# Patient Record
Sex: MALE | Race: BLACK OR AFRICAN AMERICAN | NOT HISPANIC OR LATINO | URBAN - METROPOLITAN AREA
[De-identification: names, ages, dates, MRNs, and addresses within clinical notes are randomized per-mention and may not be internally consistent; named-entity substitution may affect disease eponyms.]

---

## 2021-01-13 ENCOUNTER — INPATIENT (INPATIENT)
Facility: HOSPITAL | Age: 66
LOS: 1 days | Discharge: HOME | End: 2021-01-15
Attending: INTERNAL MEDICINE | Admitting: INTERNAL MEDICINE
Payer: MEDICARE

## 2021-01-13 VITALS
RESPIRATION RATE: 20 BRPM | OXYGEN SATURATION: 99 % | WEIGHT: 145.95 LBS | HEIGHT: 65 IN | HEART RATE: 66 BPM | DIASTOLIC BLOOD PRESSURE: 100 MMHG | TEMPERATURE: 98 F | SYSTOLIC BLOOD PRESSURE: 175 MMHG

## 2021-01-13 LAB
ACANTHOCYTES BLD QL SMEAR: SLIGHT — SIGNIFICANT CHANGE UP
ALBUMIN SERPL ELPH-MCNC: 2.9 G/DL — LOW (ref 3.3–5)
ALP SERPL-CCNC: 202 U/L — HIGH (ref 40–120)
ALT FLD-CCNC: 19 U/L — SIGNIFICANT CHANGE UP (ref 12–78)
ANION GAP SERPL CALC-SCNC: 10 MMOL/L — SIGNIFICANT CHANGE UP (ref 5–17)
ANISOCYTOSIS BLD QL: SLIGHT — SIGNIFICANT CHANGE UP
APTT BLD: 29.4 SEC — SIGNIFICANT CHANGE UP (ref 27.5–35.5)
AST SERPL-CCNC: 53 U/L — HIGH (ref 15–37)
BASOPHILS # BLD AUTO: 0.02 K/UL — SIGNIFICANT CHANGE UP (ref 0–0.2)
BASOPHILS NFR BLD AUTO: 0.3 % — SIGNIFICANT CHANGE UP (ref 0–2)
BILIRUB SERPL-MCNC: 0.7 MG/DL — SIGNIFICANT CHANGE UP (ref 0.2–1.2)
BUN SERPL-MCNC: 15 MG/DL — SIGNIFICANT CHANGE UP (ref 7–23)
CALCIUM SERPL-MCNC: 8.8 MG/DL — SIGNIFICANT CHANGE UP (ref 8.5–10.1)
CHLORIDE SERPL-SCNC: 101 MMOL/L — SIGNIFICANT CHANGE UP (ref 96–108)
CO2 SERPL-SCNC: 25 MMOL/L — SIGNIFICANT CHANGE UP (ref 22–31)
CREAT SERPL-MCNC: 0.97 MG/DL — SIGNIFICANT CHANGE UP (ref 0.5–1.3)
D DIMER BLD IA.RAPID-MCNC: 6065 NG/ML DDU — HIGH
ELLIPTOCYTES BLD QL SMEAR: SLIGHT — SIGNIFICANT CHANGE UP
EOSINOPHIL # BLD AUTO: 0.15 K/UL — SIGNIFICANT CHANGE UP (ref 0–0.5)
EOSINOPHIL NFR BLD AUTO: 2.4 % — SIGNIFICANT CHANGE UP (ref 0–6)
GLUCOSE SERPL-MCNC: 83 MG/DL — SIGNIFICANT CHANGE UP (ref 70–99)
HCT VFR BLD CALC: 32.6 % — LOW (ref 39–50)
HGB BLD-MCNC: 9.8 G/DL — LOW (ref 13–17)
HYPOCHROMIA BLD QL: SLIGHT — SIGNIFICANT CHANGE UP
IMM GRANULOCYTES NFR BLD AUTO: 0.5 % — SIGNIFICANT CHANGE UP (ref 0–1.5)
INR BLD: 1.08 RATIO — SIGNIFICANT CHANGE UP (ref 0.88–1.16)
LYMPHOCYTES # BLD AUTO: 0.83 K/UL — LOW (ref 1–3.3)
LYMPHOCYTES # BLD AUTO: 13.3 % — SIGNIFICANT CHANGE UP (ref 13–44)
MANUAL SMEAR VERIFICATION: SIGNIFICANT CHANGE UP
MCHC RBC-ENTMCNC: 20.1 PG — LOW (ref 27–34)
MCHC RBC-ENTMCNC: 30.1 GM/DL — LOW (ref 32–36)
MCV RBC AUTO: 66.9 FL — LOW (ref 80–100)
MICROCYTES BLD QL: SIGNIFICANT CHANGE UP
MONOCYTES # BLD AUTO: 0.63 K/UL — SIGNIFICANT CHANGE UP (ref 0–0.9)
MONOCYTES NFR BLD AUTO: 10.1 % — SIGNIFICANT CHANGE UP (ref 2–14)
NEUTROPHILS # BLD AUTO: 4.58 K/UL — SIGNIFICANT CHANGE UP (ref 1.8–7.4)
NEUTROPHILS NFR BLD AUTO: 73.4 % — SIGNIFICANT CHANGE UP (ref 43–77)
NRBC # BLD: 0 /100 WBCS — SIGNIFICANT CHANGE UP (ref 0–0)
OVALOCYTES BLD QL SMEAR: SLIGHT — SIGNIFICANT CHANGE UP
PLAT MORPH BLD: NORMAL — SIGNIFICANT CHANGE UP
PLATELET # BLD AUTO: 238 K/UL — SIGNIFICANT CHANGE UP (ref 150–400)
POLYCHROMASIA BLD QL SMEAR: SLIGHT — SIGNIFICANT CHANGE UP
POTASSIUM SERPL-MCNC: 3.5 MMOL/L — SIGNIFICANT CHANGE UP (ref 3.5–5.3)
POTASSIUM SERPL-SCNC: 3.5 MMOL/L — SIGNIFICANT CHANGE UP (ref 3.5–5.3)
PROT SERPL-MCNC: 6.8 GM/DL — SIGNIFICANT CHANGE UP (ref 6–8.3)
PROTHROM AB SERPL-ACNC: 12.5 SEC — SIGNIFICANT CHANGE UP (ref 10.6–13.6)
RBC # BLD: 4.87 M/UL — SIGNIFICANT CHANGE UP (ref 4.2–5.8)
RBC # FLD: 21.7 % — HIGH (ref 10.3–14.5)
RBC BLD AUTO: ABNORMAL
SCHISTOCYTES BLD QL AUTO: SLIGHT — SIGNIFICANT CHANGE UP
SODIUM SERPL-SCNC: 136 MMOL/L — SIGNIFICANT CHANGE UP (ref 135–145)
TROPONIN I SERPL-MCNC: 0.05 NG/ML — HIGH (ref 0.01–0.04)
WBC # BLD: 6.24 K/UL — SIGNIFICANT CHANGE UP (ref 3.8–10.5)
WBC # FLD AUTO: 6.24 K/UL — SIGNIFICANT CHANGE UP (ref 3.8–10.5)

## 2021-01-13 PROCEDURE — 93970 EXTREMITY STUDY: CPT | Mod: 26

## 2021-01-13 PROCEDURE — 71045 X-RAY EXAM CHEST 1 VIEW: CPT | Mod: 26

## 2021-01-13 PROCEDURE — 99223 1ST HOSP IP/OBS HIGH 75: CPT | Mod: AI

## 2021-01-13 PROCEDURE — 99285 EMERGENCY DEPT VISIT HI MDM: CPT

## 2021-01-13 RX ORDER — HEPARIN SODIUM 5000 [USP'U]/ML
5500 INJECTION INTRAVENOUS; SUBCUTANEOUS EVERY 6 HOURS
Refills: 0 | Status: DISCONTINUED | OUTPATIENT
Start: 2021-01-13 | End: 2021-01-14

## 2021-01-13 RX ORDER — HEPARIN SODIUM 5000 [USP'U]/ML
2500 INJECTION INTRAVENOUS; SUBCUTANEOUS EVERY 6 HOURS
Refills: 0 | Status: DISCONTINUED | OUTPATIENT
Start: 2021-01-13 | End: 2021-01-14

## 2021-01-13 RX ORDER — HEPARIN SODIUM 5000 [USP'U]/ML
5500 INJECTION INTRAVENOUS; SUBCUTANEOUS ONCE
Refills: 0 | Status: COMPLETED | OUTPATIENT
Start: 2021-01-13 | End: 2021-01-13

## 2021-01-13 RX ORDER — HEPARIN SODIUM 5000 [USP'U]/ML
INJECTION INTRAVENOUS; SUBCUTANEOUS
Qty: 25000 | Refills: 0 | Status: DISCONTINUED | OUTPATIENT
Start: 2021-01-13 | End: 2021-01-14

## 2021-01-13 RX ORDER — LABETALOL HCL 100 MG
10 TABLET ORAL ONCE
Refills: 0 | Status: COMPLETED | OUTPATIENT
Start: 2021-01-13 | End: 2021-01-13

## 2021-01-13 RX ADMIN — HEPARIN SODIUM 5000 UNIT(S): 5000 INJECTION INTRAVENOUS; SUBCUTANEOUS at 21:42

## 2021-01-13 RX ADMIN — Medication 10 MILLIGRAM(S): at 21:20

## 2021-01-13 RX ADMIN — HEPARIN SODIUM 1200 UNIT(S)/HR: 5000 INJECTION INTRAVENOUS; SUBCUTANEOUS at 21:40

## 2021-01-13 NOTE — H&P ADULT - NSHPLABSRESULTS_GEN_ALL_CORE
LABS:                        8.6    5.63  )-----------( 224      ( 14 Jan 2021 05:00 )             27.8     01-13    136  |  101  |  15  ----------------------------<  83  3.5   |  25  |  0.97    Ca    8.8      13 Jan 2021 20:30    TPro  6.8  /  Alb  2.9<L>  /  TBili  0.7  /  DBili  x   /  AST  53<H>  /  ALT  19  /  AlkPhos  202<H>  01-13    PT/INR - ( 13 Jan 2021 20:30 )   PT: 12.5 sec;   INR: 1.08 ratio         PTT - ( 14 Jan 2021 05:00 )  PTT:66.6 sec    CAPILLARY BLOOD GLUCOSE            RADIOLOGY & ADDITIONAL TESTS:    Imaging Personally Reviewed:  [ X] YES  [ ] NO

## 2021-01-13 NOTE — ED PROVIDER NOTE - CLINICAL SUMMARY MEDICAL DECISION MAKING FREE TEXT BOX
Patient presents from physician for PE.  VSS, no hypoxia or evidence of heart strain.  US pending, can be obtained on admission, no edema.  EKG nonischemic, elevated trop likely due to PE.  Patient being heparinized.  Family aware.  Patient is to be admitted to the hospital and the case was discussed with the admitting physician.  Any changes in plan, additional imaging/labs, and further work up will be at the discretion of the admitting physician. Per discussion with admitting physician, all necessary consults for admitted patients to be obtained by morning team unless patient requires emergent intervention or medical advice by specialist overnight.

## 2021-01-13 NOTE — ED PROVIDER NOTE - PROGRESS NOTE DETAILS
Contact Dr. Ortiz. Pending return call. 2nd call placed to Dr. Mullins Dr. Ramirez called ER from Dr. Hernandez service, informs that patient has "multifocal pulmonary emboli....most extensive in the RLL."  Patient already being heparinized.

## 2021-01-13 NOTE — ED PROVIDER NOTE - NS ED ROS FT
No fever/chills, No photophobia/eye pain/changes in vision, No ear pain/sore throat/dysphagia, No chest pain/palpitations, +Dyspnea on exertion  no SOB/cough/wheeze/stridor, No abdominal pain, No N/V/D, no dysuria/frequency/discharge, No neck/back pain, no rash, no changes in neurological status/function.

## 2021-01-13 NOTE — H&P ADULT - NSHPPHYSICALEXAM_GEN_ALL_CORE
Vital Signs Last 24 Hrs  T(C): 36.8 (14 Jan 2021 03:12), Max: 37.3 (13 Jan 2021 23:47)  T(F): 98.2 (14 Jan 2021 03:12), Max: 99.1 (13 Jan 2021 23:47)  HR: 57 (14 Jan 2021 03:12) (57 - 66)  BP: 151/86 (14 Jan 2021 03:12) (123/87 - 175/100)  BP(mean): --  RR: 20 (14 Jan 2021 03:12) (20 - 24)  SpO2: 100% (14 Jan 2021 03:12) (99% - 100%)    PHYSICAL EXAM:    GENERAL: NAD, well-groomed, well-developed  HEAD:  Atraumatic, Normocephalic  EYES: EOMI, PERRLA, conjunctiva and sclera clear  ENMT: No tonsillar erythema, exudates, or enlargement; Moist mucous membranes, No lesions  NECK: Supple, No JVD, Normal thyroid  NERVOUS SYSTEM:  Alert & Oriented X3, Good concentration; Motor Strength 5/5 B/L upper and lower extremities; DTRs 2+ intact and symmetric  CHEST/LUNG: Clear to percussion bilaterally; No rales, rhonchi, wheezing, or rubs  HEART: Regular rate and rhythm; No rubs, or gallops, +S1,S2  ABDOMEN: Soft, Nontender, Nondistended; Bowel sounds present  EXTREMITIES:  2+ Peripheral Pulses, No clubbing, cyanosis, or edema  LYMPH: No cervical adenopathy  RECTAL: deferred  BREAST: No palpatble masses, skin no lesions   : deferred  SKIN: No rashes or lesions    IMPROVE VTE Individual Risk Assessment        RISK                                                          Points  [ x ] Previous VTE                                                3  [  ] Thrombophilia                                             2  [  ] Lower limb paralysis                                    2        (unable to hold up >15 seconds)    [  ] Current Cancer                                             2         (within 6 months)  [ x ] Immobilization > 24 hrs                              1  [  ] ICU/CCU stay > 24 hours                            1  [  x] Age > 60                                                    1  IMPROVE VTE Score _____5____

## 2021-01-13 NOTE — ED ADULT TRIAGE NOTE - CHIEF COMPLAINT QUOTE
pt states had ct chest done at outpt center today and results show pe referred to er denies shortness of breath c/o chest discomfort per pt had negative covid test today bp elevated in triage hx htn missed bp meds x 3 days per pt

## 2021-01-13 NOTE — ED ADULT NURSE NOTE - OBJECTIVE STATEMENT
Pt send by PMD for PE found on CTA done for colon/liver cancer staging this morning. Pt anxious, tachypneic, and hypertensive. NSR on cardiac monitor, 100% oxygen saturation on room air. Pt denies chest pain and shortness of breath, states only symptom is anxiety. Pt also has history of prostate cancer and prostatectomy x 10 yrs ago. Dr Burgos unable to visualize CTA completed outpatient, per physician son Dia requesting initiation of heparin sooner rather than later due to high risk coagulable state.

## 2021-01-13 NOTE — H&P ADULT - HISTORY OF PRESENT ILLNESS
Pt is a 64 y/o male w/pmhx of prostate ca in remission, and HTN was being w/u for wt loss by pmd and pt had a ct done that revealed PE and also ascending colon mass and sent in by pmd for eval.  pt currently denies any fever, chills, sob,cp, palpitations, n/vd/c/ in ed was sating 99% on ra.  	Dr. Mullins Contact:   	[phone]

## 2021-01-13 NOTE — ED PROVIDER NOTE - PHYSICAL EXAMINATION
Gen: Alert, NAD, well appearing  Head: NC, AT, EOMI, normal lids/conjunctiva  ENT: normal hearing, patent oropharynx without erythema/exudate, uvula midline  Neck: +supple, no tenderness/meningismus/JVD, +Trachea midline  Pulm: Bilateral BS, normal resp effort, no wheeze/stridor/retractions  CV: RRR, no M/R/G, +dist pulses  Abd: soft, NT/ND, Negative [place] signs, +BS, no palpable masses  Mskel: no edema/erythema/cyanosis  Skin: no rash, warm/dry  Neuro: AAOx3, no apparent sensory/motor deficits, coordination intact

## 2021-01-13 NOTE — ED PROVIDER NOTE - OBJECTIVE STATEMENT
Triage Nurses Notes: "pt states had ct chest done at outpt center today and results show pe referred to er denies shortness of breath c/o chest discomfort per pt had negative covid test today bp elevated in triage hx htn missed bp meds x 3 days per pt".     Pertinent PMH/PSH/FHx/SHx and Review of Systems contained within:    64 y/o M with a PMHx of HTN and Prostate cancer (in remission) presents to the ED c/o dyspnea on exertion. Currently in the ER, Patient's O2 at bedside is 99% on room air. Denies chest pain, calf pain, calf edema, cough, hemoptysis and SOB. Patient was followed by his PCP Dr. Mullins for rapid weight loss and weakness. Patient was sent for a OP CT scan of his chest and abdomen to evaluate his symptoms. Dr. Mullins called the Patient today and told him to come to the ER for further evaluation. CT of abdomen showed colonic mass in the ascending colon and CT of chest was read positive for PE. Patient was able to present the CT imaging disc but does not have the actual radiology report. Currently there is no radiologist on site to read imagining at this time. Patient denies EtOH/tobacco/illicit substance use. Triage Nurses Notes: "pt states had ct chest done at outpt center today and results show pe referred to er denies shortness of breath c/o chest discomfort per pt had negative covid test today bp elevated in triage hx htn missed bp meds x 3 days per pt".     Pertinent PMH/PSH/FHx/SHx and Review of Systems contained within:    66 y/o M with a PMHx of HTN and Prostate cancer (in remission) presents to the ED c/o dyspnea on exertion. Currently in the ER, Patient's O2 at bedside is 99% on room air. Denies chest pain, calf pain, calf edema, cough, hemoptysis and SOB. Patient was followed by his PCP Dr. Mullins for rapid weight loss and weakness. Patient was sent for a OP CT scan of his chest and abdomen to evaluate his symptoms. Dr. Mullins called the Patient today and told him to come to the ER for further evaluation. CT of abdomen showed colonic mass in the ascending colon and CT of chest was read positive for PE. Patient was able to present the CT imaging disc but does not have the actual radiology report. Currently there is no radiologist on site to read imagining at this time. Patient denies EtOH/tobacco/illicit substance use.    Dr. Mullins Contact:   [phone] Triage Nurses Notes: "pt states had ct chest done at outpt center today and results show pe referred to er denies shortness of breath c/o chest discomfort per pt had negative covid test today bp elevated in triage hx htn missed bp meds x 3 days per pt".     Pertinent PMH/PSH/FHx/SHx and Review of Systems contained within:    66 y/o M with a PMHx of HTN and Prostate cancer (in remission) presents to the ED c/o dyspnea on exertion. Currently in the ER, Patient's O2 at bedside is 99% on room air. Denies chest pain, calf pain, calf edema, cough, hemoptysis and SOB. Patient was followed by his PCP Dr. Mullins for rapid weight loss and weakness. Patient was sent for a OP CT scan of his chest and abdomen to evaluate his symptoms. Dr. Mullins called the Patient today and told him to come to the ER for pulmonary emboli seen on chest CT. CT of abdomen showed colonic mass in the ascending colon, patient has remote h/o prostate cancer which had been in remission, he is now believed to have metastatic cancer. Patient was able to present the CT imaging disc but does not have the actual radiology report. Currently there is no radiologist on site to read imagining at this time. Patient denies EtOH/tobacco/illicit substance use.    Dr. Mullins Contact:   [phone]

## 2021-01-14 DIAGNOSIS — Z29.9 ENCOUNTER FOR PROPHYLACTIC MEASURES, UNSPECIFIED: ICD-10-CM

## 2021-01-14 DIAGNOSIS — I26.99 OTHER PULMONARY EMBOLISM WITHOUT ACUTE COR PULMONALE: ICD-10-CM

## 2021-01-14 DIAGNOSIS — I10 ESSENTIAL (PRIMARY) HYPERTENSION: ICD-10-CM

## 2021-01-14 DIAGNOSIS — R09.89 OTHER SPECIFIED SYMPTOMS AND SIGNS INVOLVING THE CIRCULATORY AND RESPIRATORY SYSTEMS: ICD-10-CM

## 2021-01-14 LAB
APTT BLD: 66.6 SEC — HIGH (ref 27.5–35.5)
BLD GP AB SCN SERPL QL: SIGNIFICANT CHANGE UP
HCT VFR BLD CALC: 27.8 % — LOW (ref 39–50)
HCT VFR BLD CALC: 29.1 % — LOW (ref 39–50)
HCV AB S/CO SERPL IA: 0.09 S/CO — SIGNIFICANT CHANGE UP (ref 0–0.99)
HCV AB SERPL-IMP: SIGNIFICANT CHANGE UP
HGB BLD-MCNC: 8.6 G/DL — LOW (ref 13–17)
HGB BLD-MCNC: 8.8 G/DL — LOW (ref 13–17)
MCHC RBC-ENTMCNC: 20 PG — LOW (ref 27–34)
MCHC RBC-ENTMCNC: 20.4 PG — LOW (ref 27–34)
MCHC RBC-ENTMCNC: 30.2 GM/DL — LOW (ref 32–36)
MCHC RBC-ENTMCNC: 30.9 GM/DL — LOW (ref 32–36)
MCV RBC AUTO: 65.9 FL — LOW (ref 80–100)
MCV RBC AUTO: 66 FL — LOW (ref 80–100)
NRBC # BLD: 0 /100 WBCS — SIGNIFICANT CHANGE UP (ref 0–0)
NRBC # BLD: 0 /100 WBCS — SIGNIFICANT CHANGE UP (ref 0–0)
PLATELET # BLD AUTO: 221 K/UL — SIGNIFICANT CHANGE UP (ref 150–400)
PLATELET # BLD AUTO: 224 K/UL — SIGNIFICANT CHANGE UP (ref 150–400)
RAPID RVP RESULT: SIGNIFICANT CHANGE UP
RBC # BLD: 4.22 M/UL — SIGNIFICANT CHANGE UP (ref 4.2–5.8)
RBC # BLD: 4.41 M/UL — SIGNIFICANT CHANGE UP (ref 4.2–5.8)
RBC # FLD: 21.1 % — HIGH (ref 10.3–14.5)
RBC # FLD: 21.2 % — HIGH (ref 10.3–14.5)
SARS-COV-2 IGG SERPL QL IA: NEGATIVE — SIGNIFICANT CHANGE UP
SARS-COV-2 IGM SERPL IA-ACNC: 0.07 INDEX — SIGNIFICANT CHANGE UP
SARS-COV-2 RNA SPEC QL NAA+PROBE: SIGNIFICANT CHANGE UP
WBC # BLD: 5.23 K/UL — SIGNIFICANT CHANGE UP (ref 3.8–10.5)
WBC # BLD: 5.63 K/UL — SIGNIFICANT CHANGE UP (ref 3.8–10.5)
WBC # FLD AUTO: 5.23 K/UL — SIGNIFICANT CHANGE UP (ref 3.8–10.5)
WBC # FLD AUTO: 5.63 K/UL — SIGNIFICANT CHANGE UP (ref 3.8–10.5)

## 2021-01-14 PROCEDURE — 99233 SBSQ HOSP IP/OBS HIGH 50: CPT

## 2021-01-14 RX ORDER — LOSARTAN POTASSIUM 100 MG/1
25 TABLET, FILM COATED ORAL DAILY
Refills: 0 | Status: DISCONTINUED | OUTPATIENT
Start: 2021-01-14 | End: 2021-01-15

## 2021-01-14 RX ORDER — AMLODIPINE BESYLATE 2.5 MG/1
10 TABLET ORAL DAILY
Refills: 0 | Status: DISCONTINUED | OUTPATIENT
Start: 2021-01-14 | End: 2021-01-15

## 2021-01-14 RX ORDER — RIVAROXABAN 15 MG-20MG
15 KIT ORAL
Refills: 0 | Status: DISCONTINUED | OUTPATIENT
Start: 2021-01-14 | End: 2021-01-15

## 2021-01-14 RX ADMIN — RIVAROXABAN 15 MILLIGRAM(S): KIT at 15:57

## 2021-01-14 RX ADMIN — LOSARTAN POTASSIUM 25 MILLIGRAM(S): 100 TABLET, FILM COATED ORAL at 11:40

## 2021-01-14 RX ADMIN — AMLODIPINE BESYLATE 10 MILLIGRAM(S): 2.5 TABLET ORAL at 08:25

## 2021-01-14 NOTE — PROGRESS NOTE ADULT - PROBLEM SELECTOR PLAN 3
surgery consult  oncology consul surgery consult. Had colonoscopy prior to admission. PMD will get report, his   number is [phone]. Oncology consult reviewed. Start chemo next week.

## 2021-01-14 NOTE — PROGRESS NOTE ADULT - ASSESSMENT
pt w/hx of prostate ca in past and hld with likely colonic ca and pe pn out pt ct pt w/hx of prostate ca in past and hld with likely colonic ca and pe pn out pt ct.

## 2021-01-14 NOTE — CONSULT NOTE ADULT - SUBJECTIVE AND OBJECTIVE BOX
Patient is a 65y old  Male who presents with a chief complaint of PE (14 Jan 2021 10:29)    HPI:  66 y/o male with Prostate CA S/P Prostatectomy, HTN was being w/u for wt loss by PMD, reports having Colonoscopy last  week, found to have mass in ascending colon, biopsy taken.  Had out pt  CT chest done as part of metastatic work  up  that revealed PE,   Sent in by PMD for eval.  Denies any pulmonary symptoms like SOB, Chest pain, cough.  Non smoker    PAST MEDICAL & SURGICAL HISTORY:  Prostate cancer    HTN (hypertension)    No significant past surgical history    FAMILY HISTORY:  No pertinent family history in first degree relatives    SOCIAL HISTORY: BMI (kg/m2): 24.2 (01-14 @ 09:04). non smoker    Allergies  No Known Allergies    MEDICATIONS  (STANDING):  amLODIPine   Tablet 10 milliGRAM(s) Oral daily  heparin  Infusion.  Unit(s)/Hr (12 mL/Hr) IV Continuous <Continuous>  losartan 25 milliGRAM(s) Oral daily    MEDICATIONS  (PRN):  heparin   Injectable 5500 Unit(s) IV Push every 6 hours PRN For aPTT less than 40  heparin   Injectable 2500 Unit(s) IV Push every 6 hours PRN For aPTT between 40 - 57    REVIEW OF SYSTEMS:    Constitutional:            No fever, weight loss or fatigue  HEENT:                         No difficulty hearing, tinnitus, vertigo; No sinus or throat pain  Respiratory:                No SOB  Cardiovascular:           No chest pain, palpitations  Gastrointestinal:        No abdominal or epigastric pain. No N/V/diarrhea or hematemesis  Genitourinary:            No dysuria, frequency, hematuria or incontinence  SKIN:                             no rash  Musculoskeletal:        No joint pain or swelling  Extremities:                No swelling  Neurological:              No headaches  Psychiatric:                 No depression, anxiety    MACRA & MIPS:  Vaccines - Influenza: yes and Pneumovax: yes  Tobacco: ex smoker  Blood Pressure Screening / Control of: 154/88  Current Medications Reviewed:    Vital Signs Last 24 Hrs  T(C): 37 (14 Jan 2021 11:32), Max: 37.3 (13 Jan 2021 23:47)  T(F): 98.6 (14 Jan 2021 11:32), Max: 99.1 (13 Jan 2021 23:47)  HR: 61 (14 Jan 2021 11:32) (56 - 66)  BP: 154/88 (14 Jan 2021 11:32) (123/87 - 177/84)  BP(mean): --  RR: 18 (14 Jan 2021 11:32) (18 - 24)  SpO2: 96% (14 Jan 2021 11:32) (96% - 100%)    PHYSICAL EXAM:  GEN:         Awake, responsive and comfortable.  HEENT:    Normal.    RESP:       no wheezing  CVS:          Regular rate and rhythm.   ABD:         Soft, non-tender, non-distended;   SKIN:           Warm and dry.  EXTR:            No clubbing, cyanosis or edema  CNS:              Intact sensory and motor function.  PSYCH:        cooperative, no anxiety or depression    LABS:                        8.8    5.23  )-----------( 221      ( 14 Jan 2021 12:25 )             29.1     01-13    136  |  101  |  15  ----------------------------<  83  3.5   |  25  |  0.97    Ca    8.8      13 Jan 2021 20:30    TPro  6.8  /  Alb  2.9<L>  /  TBili  0.7  /  DBili  x   /  AST  53<H>  /  ALT  19  /  AlkPhos  202<H>  01-13    PT/INR - ( 13 Jan 2021 20:30 )   PT: 12.5 sec;   INR: 1.08 ratio      PTT - ( 14 Jan 2021 05:00 )  PTT:66.6 sec    EKG: sinus tachycardia    RADIOLOGY & ADDITIONAL STUDIES:  < from: US Duplex Venous Lower Ext Complete, Bilateral (01.13.21 @ 22:54) >  EXAM:  US DPLX LWR EXT VEINS COMPL BI                            PROCEDURE DATE:  01/13/2021          INTERPRETATION:  CLINICAL INFORMATION: Pulmonary embolus. Evaluate for DVT.    COMPARISON: None available.    TECHNIQUE: Duplex sonography of the BILATERAL LOWER extremity veins with color and spectral Doppler, with and without compression.    FINDINGS:    There is normal compressibility of the bilateral common femoral, femoral and popliteal veins.  Doppler examination shows normal spontaneous and phasic flow.    Occlusive DVT within the right posterior tibial vein. No left calf vein thrombosis.    IMPRESSION:  No evidence of deep venous thrombosis in the left lower extremity.    Occlusive below-the-knee deep venous thrombosis within the right posterior tibial vein.    Dr. Machuca discussed the above findings with Dr. Burgos at 1:28 AM on 1/14/2021 with read back.    JOSE MACHUCA MD; Attending Radiologist  This document has been electronically signed. Jan 14 2021  1:29AM  < from: Xray Chest 1 View- PORTABLE-Urgent (Xray Chest 1 View- PORTABLE-Urgent .) (01.13.21 @ 20:58) >  EXAM:  XR CHEST PORTABLE URGENT 1V                          PROCEDURE DATE:  01/13/2021      INTERPRETATION:  Chest pain. History of pulmonary embolism.    A single portable radiograph suggests cardiomegaly, although there may be magnification from technique..     No acute congestive changes are seen.    The lungs are clear without evidence for infiltrate or consolidation.    No pleural fluid is evident.    Mediastinal widening is noted with a tortuous aorta.    IMPRESSION:    Cardiomegaly. Clear lungs.    CHERRY ZELAYA MD; Attending Radiologist  This document has been electronically signed. Jan 14 2021  8:30AM    ASSESSMENT AND PLAN:  ·	Pulmonary Embolism, out pt CT.  ·	Anemia.  ·	Hypokalemia.  ·	Possible Colon CA.  ·	Prostate CA history.  ·	HTN.    Started on IV heparin.  Needs report of out sided CT for chart.  Pulmonary status is stable at this point, can be changed to PO anticoagulation.
Reason for Consultation: Colon cancer    HPI: Patient is a 65y Male seen on consultatioin for the evaluation and management of Colon cancer    Pt is a 64 y/o male w/pmhx of prostate ca in remission, and HTN was being w/u for wt loss by pmd and pt had a ct done that revealed PE and also ascending colon mass and sent in by pmd for eval.  pt currently denies any fever, chills, sob,cp, palpitations, n/vd/c/   Patient with CT scan which showed liver lesions and colon mass, patient with ascending colonl mass s/p colonscopy, pathology pending +    PAST MEDICAL & SURGICAL HISTORY:  Prostate cancer    HTN (hypertension)    No significant past surgical history        MEDICATIONS  (STANDING):  amLODIPine   Tablet 10 milliGRAM(s) Oral daily  heparin  Infusion.  Unit(s)/Hr (12 mL/Hr) IV Continuous <Continuous>  losartan 25 milliGRAM(s) Oral daily    MEDICATIONS  (PRN):  heparin   Injectable 5500 Unit(s) IV Push every 6 hours PRN For aPTT less than 40  heparin   Injectable 2500 Unit(s) IV Push every 6 hours PRN For aPTT between 40 - 57      Allergies    No Known Allergies    Intolerances        SOCIAL HISTORY:    Smoking Status: no  Alcohol: no  Occupation: yes    FAMILY HISTORY:  No pertinent family history in first degree relatives        Vital Signs Last 24 Hrs  T(C): 36.8 (14 Jan 2021 09:04), Max: 37.3 (13 Jan 2021 23:47)  T(F): 98.3 (14 Jan 2021 09:04), Max: 99.1 (13 Jan 2021 23:47)  HR: 60 (14 Jan 2021 09:04) (56 - 66)  BP: 164/87 (14 Jan 2021 09:04) (123/87 - 177/84)  BP(mean): --  RR: 20 (14 Jan 2021 09:04) (20 - 24)  SpO2: 97% (14 Jan 2021 09:04) (97% - 100%)    PHYSICAL EXAM:    general - AAO x 3  HEENT - No Icterus  CVS - RRR  RS - AE B/L  Abd - soft, NT  Ext - Pulses +        LABS:                        8.6    5.63  )-----------( 224      ( 14 Jan 2021 05:00 )             27.8     01-13    136  |  101  |  15  ----------------------------<  83  3.5   |  25  |  0.97    Ca    8.8      13 Jan 2021 20:30    TPro  6.8  /  Alb  2.9<L>  /  TBili  0.7  /  DBili  x   /  AST  53<H>  /  ALT  19  /  AlkPhos  202<H>  01-13    PT/INR - ( 13 Jan 2021 20:30 )   PT: 12.5 sec;   INR: 1.08 ratio         PTT - ( 14 Jan 2021 05:00 )  PTT:66.6 sec        RADIOLOGY & ADDITIONAL STUDIES:

## 2021-01-14 NOTE — PROGRESS NOTE ADULT - PROBLEM SELECTOR PLAN 1
admit ot tele  check tte  heparin gtt  pulm consult admit ot tele. Change IV heparin to Xarelto load for 21 days.  Discharge home in AM.

## 2021-01-14 NOTE — PROGRESS NOTE ADULT - SUBJECTIVE AND OBJECTIVE BOX
INTERVAL HPI/OVERNIGHT EVENTS:  Pt seen and examined at bedside.     Allergies/Intolerance: No Known Allergies      MEDICATIONS  (STANDING):  amLODIPine   Tablet 10 milliGRAM(s) Oral daily  heparin  Infusion.  Unit(s)/Hr (12 mL/Hr) IV Continuous <Continuous>  losartan 25 milliGRAM(s) Oral daily    MEDICATIONS  (PRN):  heparin   Injectable 5500 Unit(s) IV Push every 6 hours PRN For aPTT less than 40  heparin   Injectable 2500 Unit(s) IV Push every 6 hours PRN For aPTT between 40 - 57        ROS: all systems reviewed and wnl      PHYSICAL EXAMINATION:  Vital Signs Last 24 Hrs  T(C): 36.8 (14 Jan 2021 09:04), Max: 37.3 (13 Jan 2021 23:47)  T(F): 98.3 (14 Jan 2021 09:04), Max: 99.1 (13 Jan 2021 23:47)  HR: 60 (14 Jan 2021 09:04) (56 - 66)  BP: 164/87 (14 Jan 2021 09:04) (123/87 - 177/84)  BP(mean): --  RR: 20 (14 Jan 2021 09:04) (20 - 24)  SpO2: 97% (14 Jan 2021 09:04) (97% - 100%)  CAPILLARY BLOOD GLUCOSE            GENERAL:   NECK: supple, No JVD  CHEST/LUNG: clear to auscultation bilaterally; no rales, rhonchi, or wheezing b/l  HEART: normal S1, S2  ABDOMEN: BS+, soft, ND, NT   EXTREMITIES:  pulses palpable; no clubbing, cyanosis, or edema b/l LEs  SKIN: no rashes or lesions      LABS:                        8.6    5.63  )-----------( 224      ( 14 Jan 2021 05:00 )             27.8     01-13    136  |  101  |  15  ----------------------------<  83  3.5   |  25  |  0.97    Ca    8.8      13 Jan 2021 20:30    TPro  6.8  /  Alb  2.9<L>  /  TBili  0.7  /  DBili  x   /  AST  53<H>  /  ALT  19  /  AlkPhos  202<H>  01-13    PT/INR - ( 13 Jan 2021 20:30 )   PT: 12.5 sec;   INR: 1.08 ratio         PTT - ( 14 Jan 2021 05:00 )  PTT:66.6 sec           INTERVAL HPI/OVERNIGHT EVENTS:  Pt seen and examined at bedside.     Allergies/Intolerance: No Known Allergies      MEDICATIONS  (STANDING):  amLODIPine   Tablet 10 milliGRAM(s) Oral daily  heparin  Infusion.  Unit(s)/Hr (12 mL/Hr) IV Continuous <Continuous>  losartan 25 milliGRAM(s) Oral daily    MEDICATIONS  (PRN):  heparin   Injectable 5500 Unit(s) IV Push every 6 hours PRN For aPTT less than 40  heparin   Injectable 2500 Unit(s) IV Push every 6 hours PRN For aPTT between 40 - 57        ROS: all systems reviewed and wnl      PHYSICAL EXAMINATION:  Vital Signs Last 24 Hrs  T(C): 36.8 (14 Jan 2021 09:04), Max: 37.3 (13 Jan 2021 23:47)  T(F): 98.3 (14 Jan 2021 09:04), Max: 99.1 (13 Jan 2021 23:47)  HR: 60 (14 Jan 2021 09:04) (56 - 66)  BP: 164/87 (14 Jan 2021 09:04) (123/87 - 177/84)  BP(mean): --  RR: 20 (14 Jan 2021 09:04) (20 - 24)  SpO2: 97% (14 Jan 2021 09:04) (97% - 100%)  CAPILLARY BLOOD GLUCOSE            GENERAL: stable, no new complaints, no CP or SOB  NECK: supple, No JVD  CHEST/LUNG: clear to auscultation bilaterally; no rales, rhonchi, or wheezing b/l  HEART: normal S1, S2  ABDOMEN: BS+, soft, ND, NT   EXTREMITIES:  pulses palpable; no clubbing, cyanosis, or edema b/l LEs  SKIN: no rashes or lesions      LABS:                        8.6    5.63  )-----------( 224      ( 14 Jan 2021 05:00 )             27.8     01-13    136  |  101  |  15  ----------------------------<  83  3.5   |  25  |  0.97    Ca    8.8      13 Jan 2021 20:30    TPro  6.8  /  Alb  2.9<L>  /  TBili  0.7  /  DBili  x   /  AST  53<H>  /  ALT  19  /  AlkPhos  202<H>  01-13    PT/INR - ( 13 Jan 2021 20:30 )   PT: 12.5 sec;   INR: 1.08 ratio         PTT - ( 14 Jan 2021 05:00 )  PTT:66.6 sec

## 2021-01-14 NOTE — CONSULT NOTE ADULT - PROBLEM SELECTOR RECOMMENDATION 9
- patient s/p colonscopy pathology pending on outside  - CT on outside showed liver lesions, and colon mass  - patient with below knee DVT on dopplers and PE on outside Scan  - started on heparin drip  - patient has appointment with [facility] next week for oncology evaluation

## 2021-01-14 NOTE — ED ADULT NURSE REASSESSMENT NOTE - NS ED NURSE REASSESS COMMENT FT1
Venous lower extremity ultrasound positive for popliteal occlusion, b/l radial pulses strong, cap refill <2 3 seconds, feet warm to touch.

## 2021-01-15 VITALS
HEART RATE: 58 BPM | RESPIRATION RATE: 18 BRPM | TEMPERATURE: 98 F | DIASTOLIC BLOOD PRESSURE: 86 MMHG | OXYGEN SATURATION: 99 % | SYSTOLIC BLOOD PRESSURE: 137 MMHG

## 2021-01-15 LAB
CEA SERPL-MCNC: HIGH NG/ML (ref 0–3.8)
FERRITIN SERPL-MCNC: 127 NG/ML — SIGNIFICANT CHANGE UP (ref 30–400)
HCT VFR BLD CALC: 31 % — LOW (ref 39–50)
HGB BLD-MCNC: 9.2 G/DL — LOW (ref 13–17)
IRON SATN MFR SERPL: 11 % — LOW (ref 16–55)
IRON SATN MFR SERPL: 26 UG/DL — LOW (ref 45–165)
MCHC RBC-ENTMCNC: 20 PG — LOW (ref 27–34)
MCHC RBC-ENTMCNC: 29.7 GM/DL — LOW (ref 32–36)
MCV RBC AUTO: 67.2 FL — LOW (ref 80–100)
NRBC # BLD: 0 /100 WBCS — SIGNIFICANT CHANGE UP (ref 0–0)
PLATELET # BLD AUTO: 229 K/UL — SIGNIFICANT CHANGE UP (ref 150–400)
RBC # BLD: 4.61 M/UL — SIGNIFICANT CHANGE UP (ref 4.2–5.8)
RBC # FLD: 21.2 % — HIGH (ref 10.3–14.5)
TIBC SERPL-MCNC: 237 UG/DL — SIGNIFICANT CHANGE UP (ref 220–430)
UIBC SERPL-MCNC: 210 UG/DL — SIGNIFICANT CHANGE UP (ref 110–370)
WBC # BLD: 4.86 K/UL — SIGNIFICANT CHANGE UP (ref 3.8–10.5)
WBC # FLD AUTO: 4.86 K/UL — SIGNIFICANT CHANGE UP (ref 3.8–10.5)

## 2021-01-15 PROCEDURE — 99239 HOSP IP/OBS DSCHRG MGMT >30: CPT

## 2021-01-15 RX ORDER — RIVAROXABAN 15 MG-20MG
1 KIT ORAL
Qty: 42 | Refills: 0
Start: 2021-01-15 | End: 2021-02-04

## 2021-01-15 RX ADMIN — RIVAROXABAN 15 MILLIGRAM(S): KIT at 01:06

## 2021-01-15 RX ADMIN — AMLODIPINE BESYLATE 10 MILLIGRAM(S): 2.5 TABLET ORAL at 12:30

## 2021-01-15 RX ADMIN — RIVAROXABAN 15 MILLIGRAM(S): KIT at 12:30

## 2021-01-15 RX ADMIN — LOSARTAN POTASSIUM 25 MILLIGRAM(S): 100 TABLET, FILM COATED ORAL at 12:30

## 2021-01-15 NOTE — DISCHARGE NOTE NURSING/CASE MANAGEMENT/SOCIAL WORK - PATIENT PORTAL LINK FT
You can access the FollowMyHealth Patient Portal offered by [facility] by registering at the following website: [email or web address]. By joining [employer]’s FollowMyHealth portal, you will also be able to view your health information using other applications (apps) compatible with our system.

## 2021-01-15 NOTE — PROGRESS NOTE ADULT - PROBLEM SELECTOR PLAN 1
admit ot tele. Change IV heparin to Xarelto load for 21 days.  Discharge home in AM. admit ot tele. Change IV heparin to Xarelto load for 21 days.  Discharge home in AM. PE from hypercoagulability from cancer.

## 2021-01-15 NOTE — DISCHARGE NOTE PROVIDER - PROVIDER TOKENS
PROVIDER:[TOKEN:[[id number]]],FREE:[LAST:[[facility]],PHONE:[(   )    -],FAX:[(   )    -]]

## 2021-01-15 NOTE — DISCHARGE NOTE PROVIDER - HOSPITAL COURSE
65 year old male with PMH HTN, prostate cancer, Colon CA admitted with shortness of breath CT demonstrates pulmonary embolism. Patient hospital course with IV heparin changed to Xarelto load for 21 days and will continue 20 mg daily for 6 months. Patient will be starting chemo next week for Colon cancer. Patient is stable for discharge today.     Assessment and Plan:   · Assessment    pt w/hx of prostate ca in past and hld with likely colonic ca and pe pn out pt ct.      Problem/Plan - 1:  ·  Problem: Acute pulmonary embolism without acute cor pulmonale, unspecified pulmonary embolism type.  Plan: Xarelto load 15 mg twice daily  for 21 days then 20 mg daily for 6 months.      Problem/Plan - 2:  ·  Problem: Essential hypertension.  Plan: cont out pt meds of Norvasc 10 and Cozaar 25.      Problem/Plan - 3:  ·  Problem: R/O Malignant neoplasm of ascending colon.  Plan: surgery consult. Had colonoscopy prior to admission. PMD will get report, his   number is [phone]. Oncology consult reviewed. Start chemo next week.      Problem/Plan - 4:  ·  Problem: Preventive measure.  Plan: on ac as above.

## 2021-01-15 NOTE — PROGRESS NOTE ADULT - PROBLEM SELECTOR PROBLEM 1
Acute pulmonary embolism without acute cor pulmonale, unspecified pulmonary embolism type
Acute pulmonary embolism without acute cor pulmonale, unspecified pulmonary embolism type

## 2021-01-15 NOTE — DISCHARGE NOTE NURSING/CASE MANAGEMENT/SOCIAL WORK - NSDCPEXARELTOFU_GEN_ALL_CORE
Go for blood tests as directed. Your doctor will do lab tests at regular visits to monitor the effects of this medicine. Please follow up with your doctor and keep your health care provider appointments. 0 = independent

## 2021-01-15 NOTE — DISCHARGE NOTE PROVIDER - NSDCCPCAREPLAN_GEN_ALL_CORE_FT
PRINCIPAL DISCHARGE DIAGNOSIS  Diagnosis: Pulmonary embolism  Assessment and Plan of Treatment: xarelto load         SECONDARY DISCHARGE DIAGNOSES  Diagnosis: Colon cancer  Assessment and Plan of Treatment: [facility]  to begin chemo next week

## 2021-01-15 NOTE — DISCHARGE NOTE PROVIDER - NSDCMRMEDTOKEN_GEN_ALL_CORE_FT
amLODIPine 10 mg oral tablet: 1 tab(s) orally once a day  losartan 25 mg oral tablet: 1 tab(s) orally once a day  rivaroxaban 15 mg oral tablet: 1 tab(s) orally 2 times a day   rivaroxaban 20 mg oral tablet: 1 tab(s) orally once a day (in the evening)  to start after 21 day load

## 2021-01-15 NOTE — PROGRESS NOTE ADULT - SUBJECTIVE AND OBJECTIVE BOX
INTERVAL HPI/OVERNIGHT EVENTS:  Pt seen and examined at bedside.     Allergies/Intolerance: No Known Allergies      MEDICATIONS  (STANDING):  amLODIPine   Tablet 10 milliGRAM(s) Oral daily  losartan 25 milliGRAM(s) Oral daily  rivaroxaban 15 milliGRAM(s) Oral <User Schedule>    MEDICATIONS  (PRN):        ROS: all systems reviewed and wnl      PHYSICAL EXAMINATION:  Vital Signs Last 24 Hrs  T(C): 36.1 (15 Akash 2021 04:23), Max: 37.1 (14 Jan 2021 16:45)  T(F): 97 (15 Akash 2021 04:23), Max: 98.8 (14 Jan 2021 16:45)  HR: 56 (15 Akash 2021 04:23) (56 - 105)  BP: 127/75 (15 Akash 2021 04:23) (127/75 - 154/88)  BP(mean): --  RR: 18 (15 Akash 2021 04:23) (18 - 18)  SpO2: 99% (15 Akash 2021 04:23) (96% - 100%)  CAPILLARY BLOOD GLUCOSE          01-14 @ 07:01  -  01-15 @ 07:00  --------------------------------------------------------  IN: 720 mL / OUT: 1600 mL / NET: -880 mL        GENERAL:   NECK: supple, No JVD  CHEST/LUNG: clear to auscultation bilaterally; no rales, rhonchi, or wheezing b/l  HEART: normal S1, S2  ABDOMEN: BS+, soft, ND, NT   EXTREMITIES:  pulses palpable; no clubbing, cyanosis, or edema b/l LEs  SKIN: no rashes or lesions      LABS:                        9.2    4.86  )-----------( 229      ( 15 Akash 2021 07:26 )             31.0     01-13    136  |  101  |  15  ----------------------------<  83  3.5   |  25  |  0.97    Ca    8.8      13 Jan 2021 20:30    TPro  6.8  /  Alb  2.9<L>  /  TBili  0.7  /  DBili  x   /  AST  53<H>  /  ALT  19  /  AlkPhos  202<H>  01-13    PT/INR - ( 13 Jan 2021 20:30 )   PT: 12.5 sec;   INR: 1.08 ratio         PTT - ( 14 Jan 2021 05:00 )  PTT:66.6 sec           INTERVAL HPI/OVERNIGHT EVENTS:  Pt seen and examined at bedside.     Allergies/Intolerance: No Known Allergies      MEDICATIONS  (STANDING):  amLODIPine   Tablet 10 milliGRAM(s) Oral daily  losartan 25 milliGRAM(s) Oral daily  rivaroxaban 15 milliGRAM(s) Oral <User Schedule>    MEDICATIONS  (PRN):        ROS: all systems reviewed and wnl      PHYSICAL EXAMINATION:  Vital Signs Last 24 Hrs  T(C): 36.1 (15 Akash 2021 04:23), Max: 37.1 (14 Jan 2021 16:45)  T(F): 97 (15 Akash 2021 04:23), Max: 98.8 (14 Jan 2021 16:45)  HR: 56 (15 Akash 2021 04:23) (56 - 105)  BP: 127/75 (15 Akash 2021 04:23) (127/75 - 154/88)  BP(mean): --  RR: 18 (15 Akash 2021 04:23) (18 - 18)  SpO2: 99% (15 Akash 2021 04:23) (96% - 100%)  CAPILLARY BLOOD GLUCOSE          01-14 @ 07:01  -  01-15 @ 07:00  --------------------------------------------------------  IN: 720 mL / OUT: 1600 mL / NET: -880 mL        GENERAL: stable, no new complaints  NECK: supple, No JVD  CHEST/LUNG: clear to auscultation bilaterally; no rales, rhonchi, or wheezing b/l  HEART: normal S1, S2  ABDOMEN: BS+, soft, ND, NT   EXTREMITIES:  pulses palpable; no clubbing, cyanosis, or edema b/l LEs  SKIN: no rashes or lesions      LABS:                        9.2    4.86  )-----------( 229      ( 15 Akash 2021 07:26 )             31.0     01-13    136  |  101  |  15  ----------------------------<  83  3.5   |  25  |  0.97    Ca    8.8      13 Jan 2021 20:30    TPro  6.8  /  Alb  2.9<L>  /  TBili  0.7  /  DBili  x   /  AST  53<H>  /  ALT  19  /  AlkPhos  202<H>  01-13    PT/INR - ( 13 Jan 2021 20:30 )   PT: 12.5 sec;   INR: 1.08 ratio         PTT - ( 14 Jan 2021 05:00 )  PTT:66.6 sec

## 2021-01-15 NOTE — DISCHARGE NOTE PROVIDER - CARE PROVIDER_API CALL
ROLA HILTON  [id number]  [address], [address]  [place] [address]  Phone: [phone]  Fax: ()-  Follow Up Time:     [facility],   Phone: (   )    -  Fax: (   )    -  Follow Up Time:

## 2021-01-15 NOTE — PROGRESS NOTE ADULT - PROBLEM SELECTOR PLAN 3
surgery consult. Had colonoscopy prior to admission. PMD will get report, his   number is [phone]. Oncology consult reviewed. Start chemo next week.

## 2021-01-21 DIAGNOSIS — I10 ESSENTIAL (PRIMARY) HYPERTENSION: ICD-10-CM

## 2021-01-21 DIAGNOSIS — D64.9 ANEMIA, UNSPECIFIED: ICD-10-CM

## 2021-01-21 DIAGNOSIS — I26.99 OTHER PULMONARY EMBOLISM WITHOUT ACUTE COR PULMONALE: ICD-10-CM

## 2021-01-21 DIAGNOSIS — I82.431 ACUTE EMBOLISM AND THROMBOSIS OF RIGHT POPLITEAL VEIN: ICD-10-CM

## 2021-01-21 DIAGNOSIS — E87.6 HYPOKALEMIA: ICD-10-CM

## 2021-01-21 DIAGNOSIS — E78.5 HYPERLIPIDEMIA, UNSPECIFIED: ICD-10-CM

## 2021-01-21 DIAGNOSIS — Z85.46 PERSONAL HISTORY OF MALIGNANT NEOPLASM OF PROSTATE: ICD-10-CM

## 2021-01-21 DIAGNOSIS — C18.2 MALIGNANT NEOPLASM OF ASCENDING COLON: ICD-10-CM

## 2021-07-14 ENCOUNTER — INPATIENT (INPATIENT)
Facility: HOSPITAL | Age: 66
LOS: 8 days | Discharge: HOME | End: 2021-07-23
Attending: INTERNAL MEDICINE | Admitting: INTERNAL MEDICINE
Payer: MEDICARE

## 2021-07-14 VITALS
HEIGHT: 65 IN | OXYGEN SATURATION: 100 % | DIASTOLIC BLOOD PRESSURE: 72 MMHG | SYSTOLIC BLOOD PRESSURE: 109 MMHG | RESPIRATION RATE: 16 BRPM | HEART RATE: 104 BPM | TEMPERATURE: 100 F

## 2021-07-14 LAB
ALBUMIN SERPL ELPH-MCNC: 2.8 G/DL — LOW (ref 3.3–5)
ALP SERPL-CCNC: 219 U/L — HIGH (ref 40–120)
ALT FLD-CCNC: 10 U/L — SIGNIFICANT CHANGE UP (ref 4–41)
ANION GAP SERPL CALC-SCNC: 12 MMOL/L — SIGNIFICANT CHANGE UP (ref 7–14)
APTT BLD: 32.6 SEC — SIGNIFICANT CHANGE UP (ref 27–36.3)
AST SERPL-CCNC: 27 U/L — SIGNIFICANT CHANGE UP (ref 4–40)
B PERT IGG+IGM PNL SER: ABNORMAL
BASOPHILS # BLD AUTO: 0 K/UL — SIGNIFICANT CHANGE UP (ref 0–0.2)
BASOPHILS NFR BLD AUTO: 0 % — SIGNIFICANT CHANGE UP (ref 0–2)
BILIRUB SERPL-MCNC: 0.6 MG/DL — SIGNIFICANT CHANGE UP (ref 0.2–1.2)
BLOOD GAS VENOUS COMPREHENSIVE RESULT: SIGNIFICANT CHANGE UP
BUN SERPL-MCNC: 17 MG/DL — SIGNIFICANT CHANGE UP (ref 7–23)
CALCIUM SERPL-MCNC: 8.7 MG/DL — SIGNIFICANT CHANGE UP (ref 8.4–10.5)
CHLORIDE SERPL-SCNC: 99 MMOL/L — SIGNIFICANT CHANGE UP (ref 98–107)
CO2 SERPL-SCNC: 22 MMOL/L — SIGNIFICANT CHANGE UP (ref 22–31)
COLOR FLD: YELLOW
CREAT SERPL-MCNC: 0.79 MG/DL — SIGNIFICANT CHANGE UP (ref 0.5–1.3)
EOSINOPHIL # BLD AUTO: 0 K/UL — SIGNIFICANT CHANGE UP (ref 0–0.5)
EOSINOPHIL NFR BLD AUTO: 0 % — SIGNIFICANT CHANGE UP (ref 0–6)
FLUID INTAKE SUBSTANCE CLASS: SIGNIFICANT CHANGE UP
GLUCOSE SERPL-MCNC: 110 MG/DL — HIGH (ref 70–99)
HCT VFR BLD CALC: 27 % — LOW (ref 39–50)
HGB BLD-MCNC: 8.6 G/DL — LOW (ref 13–17)
IANC: 34.84 K/UL — HIGH (ref 1.5–8.5)
INR BLD: 1.34 RATIO — HIGH (ref 0.88–1.16)
LYMPHOCYTES # BLD AUTO: 0 % — LOW (ref 13–44)
LYMPHOCYTES # BLD AUTO: 0 K/UL — LOW (ref 1–3.3)
MCHC RBC-ENTMCNC: 22.1 PG — LOW (ref 27–34)
MCHC RBC-ENTMCNC: 31.9 GM/DL — LOW (ref 32–36)
MCV RBC AUTO: 69.4 FL — LOW (ref 80–100)
MONOCYTES # BLD AUTO: 0.3 K/UL — SIGNIFICANT CHANGE UP (ref 0–0.9)
MONOCYTES NFR BLD AUTO: 0.8 % — LOW (ref 2–14)
NEUTROPHILS # BLD AUTO: 37.74 K/UL — HIGH (ref 1.8–7.4)
NEUTROPHILS NFR BLD AUTO: 98.3 % — HIGH (ref 43–77)
PLATELET # BLD AUTO: 272 K/UL — SIGNIFICANT CHANGE UP (ref 150–400)
POTASSIUM SERPL-MCNC: 4.3 MMOL/L — SIGNIFICANT CHANGE UP (ref 3.5–5.3)
POTASSIUM SERPL-SCNC: 4.3 MMOL/L — SIGNIFICANT CHANGE UP (ref 3.5–5.3)
PROT SERPL-MCNC: 5.7 G/DL — LOW (ref 6–8.3)
PROTHROM AB SERPL-ACNC: 15.2 SEC — HIGH (ref 10.6–13.6)
RBC # BLD: 3.89 M/UL — LOW (ref 4.2–5.8)
RBC # FLD: 25.2 % — HIGH (ref 10.3–14.5)
RCV VOL RI: <1000 CELLS/UL — HIGH (ref 0–5)
SODIUM SERPL-SCNC: 133 MMOL/L — LOW (ref 135–145)
TOTAL NUCLEATED CELL COUNT, BODY FLUID: 652 CELLS/UL — HIGH (ref 0–5)
TUBE TYPE: SIGNIFICANT CHANGE UP
WBC # BLD: 38.04 K/UL — HIGH (ref 3.8–10.5)
WBC # FLD AUTO: 38.04 K/UL — HIGH (ref 3.8–10.5)

## 2021-07-14 PROCEDURE — 49082 ABD PARACENTESIS: CPT | Mod: GC

## 2021-07-14 PROCEDURE — 99285 EMERGENCY DEPT VISIT HI MDM: CPT | Mod: 25,GC

## 2021-07-14 PROCEDURE — 71045 X-RAY EXAM CHEST 1 VIEW: CPT | Mod: 26

## 2021-07-14 RX ORDER — SODIUM CHLORIDE 9 MG/ML
1000 INJECTION, SOLUTION INTRAVENOUS ONCE
Refills: 0 | Status: COMPLETED | OUTPATIENT
Start: 2021-07-14 | End: 2021-07-14

## 2021-07-14 RX ORDER — ALTEPLASE 100 MG
2 KIT INTRAVENOUS ONCE
Refills: 0 | Status: COMPLETED | OUTPATIENT
Start: 2021-07-14 | End: 2021-07-14

## 2021-07-14 RX ADMIN — SODIUM CHLORIDE 1000 MILLILITER(S): 9 INJECTION, SOLUTION INTRAVENOUS at 21:31

## 2021-07-14 RX ADMIN — Medication 500 UNIT(S): at 21:34

## 2021-07-14 NOTE — ED PROVIDER NOTE - CLINICAL SUMMARY MEDICAL DECISION MAKING FREE TEXT BOX
66M hx prostate Ca, on chemo, ? nupogen yest., rec'd abx (CTX) PTA, febrile to 101, in ED inc. abd distention. Labs, diagnostic/therapeutic paracentesis, cultures, admit.

## 2021-07-14 NOTE — ED PROVIDER NOTE - PROGRESS NOTE DETAILS
Khanh PGY3 - Pt. to be admitted to Dr. Villa.  Pt. and wife aware and agree w/ plan.  All other questions and concerns have been addressed.  Pt. will be dc/d. Khanh PGY3 - Pt. to be admitted to Dr. Villa.  Pt. and wife aware and agree w/ plan.  All other questions and concerns have been addressed.  Pt. will be dc/d

## 2021-07-14 NOTE — ED PROVIDER NOTE - PHYSICAL EXAMINATION
GENERAL: Patient awake alert NAD.  HEENT: NC/AT.  LUNGS: CTAB, no wheezes or crackles.  CARDIAC: RRR, no m/r/g.  ABDOMEN: Soft, diffuse abd tenderness, distended and tympanitic, No rebound, guarding.  EXT: No edema. No calf tenderness.  MSK: No pain with movement, no deformities.  NEURO: A&Ox3. Moving all extremities.  SKIN: Warm and dry. No rash.  PSYCH: Normal affect.

## 2021-07-14 NOTE — ED ADULT NURSE NOTE - CHIEF COMPLAINT QUOTE
Pt currently on chemo for metastatic colon cancer, last chemo Monday, pt with 101 fever yesterday and altered mental status. Pt was [place] 1 gm [place] [facility]

## 2021-07-14 NOTE — ED ADULT NURSE NOTE - OBJECTIVE STATEMENT
Nancy RN: Patient is a 66-year-old male, alert and oriented X 3, ambulatory at baseline, Phx of metastatic colon receiving treatment at [facility], last chemo on Monday. Patient presenting to the ED c/o fevers max of 101 at home. Pt arrives with right upper chest wall accessed. Pt was given [place] [facility] X1 set of blood culture sent from port. 2nd set sent from 20 G placed in L forearm. Labs drawn and sent. Port de-accessed by ICU RN at bedside. Medicated per MD orders, see flow sheet. VSS. NAD. Sinus rhythm on the monitor. Bed set in lowest position. Appears comfortable at present. Family at bedside. Will continue to monitor.

## 2021-07-14 NOTE — ED ADULT TRIAGE NOTE - CHIEF COMPLAINT QUOTE
Pt currently on chemo for metastatic colon cancer, last chemo Monday, pt with 101 fever yesterday and altered mental status. Pt was given Ceftriaxone 1 gm IVPB at [facility]

## 2021-07-14 NOTE — ED ADULT NURSE NOTE - NSIMPLEMENTINTERV_GEN_ALL_ED
Implemented All Universal Safety Interventions:  [place] to call system. Call bell, personal items and telephone within reach. Instruct patient to call for assistance. Room bathroom lighting operational. Non-slip footwear when patient is off stretcher. Physically safe environment: no spills, clutter or unnecessary equipment. Stretcher in lowest position, wheels locked, appropriate side rails in place.

## 2021-07-14 NOTE — ED PROVIDER NOTE - OBJECTIVE STATEMENT
66M w/ PMHx of metastatic colon ca p/w abdominal pain and fevers.  Received his chemo therapy on monday and started developing fevers over 101 that night.  Had fevers all throughout yesterday and went to [facility] today, where he had blood work and a CT.  CT showed abdominal ascites concerning for SBP.  Pt. was given ceftriaxone and sent to [facility].  Per wife, pt. was altered when he had a fever.  Denies any URI sxs, headache, CP, SOB, urinary sxs, rectal bleeding.  Pt. received a "shot" before chemo yesterday, presumably neupogen. 66M w/ PMHx of metastatic colon ca p/w abdominal pain and fevers.  Received his chemo therapy on monday and started developing fevers over 101 that night.  Had fevers all throughout yesterday and [place] [facility] [place] he had blood work and a CT.  CT showed abdominal ascites concerning for SBP.  Pt. was given ceftriaxone and sent to [facility].  Per wife, pt. was altered when he had a fever.  Denies any URI sxs, headache, CP, SOB, urinary sxs, rectal bleeding.  Pt. received a "shot" before chemo yesterday, presumably neupogen.    Oncologist - Dr. Goldberg [place] [facility]

## 2021-07-14 NOTE — ED ADULT TRIAGE NOTE - NSSEPSISSUSPECTED_ED_A_ED
CASE MANAGEMENT. ... Chart reviewed. Patient sleeping. Met with wife at the bedside. Clarified with her that when patient is ready for discharge, [facility] will follow patient for skilled nursing and therapy. [facility] will manage patients TPN. She understands and is agreeable. Per ss notes, [address] can start services tomorrow if patient discharges before 2pm. Otherwise, services cannot be started until Monday. HHC order and TPN orders will be required. Yes

## 2021-07-14 NOTE — ED PROVIDER NOTE - ATTENDING CONTRIBUTION TO CARE
Seen and examined, pt. mild distress, c/o fever/chills yest, noted temp of 101, denies cough/SOB, no sore throat, no N/V/D, +inc. abd distention, no back pains, no leg edema. Pt. seen by Onc and rec'd "shot" yest. prior to chemo, likely Nupogen. MM sl. dry, clear lungs, unlabored resp, heart reg, abd soft, distended, tense abd, mild tenderness, no CVAT, no edema, NT calves.

## 2021-07-15 DIAGNOSIS — I10 ESSENTIAL (PRIMARY) HYPERTENSION: ICD-10-CM

## 2021-07-15 DIAGNOSIS — I82.509 CHRONIC EMBOLISM AND THROMBOSIS OF UNSPECIFIED DEEP VEINS OF UNSPECIFIED LOWER EXTREMITY: ICD-10-CM

## 2021-07-15 DIAGNOSIS — A41.9 SEPSIS, UNSPECIFIED ORGANISM: ICD-10-CM

## 2021-07-15 DIAGNOSIS — R50.9 FEVER, UNSPECIFIED: ICD-10-CM

## 2021-07-15 DIAGNOSIS — Z29.9 ENCOUNTER FOR PROPHYLACTIC MEASURES, UNSPECIFIED: ICD-10-CM

## 2021-07-15 DIAGNOSIS — C19 MALIGNANT NEOPLASM OF RECTOSIGMOID JUNCTION: ICD-10-CM

## 2021-07-15 DIAGNOSIS — Z90.79 ACQUIRED ABSENCE OF OTHER GENITAL ORGAN(S): Chronic | ICD-10-CM

## 2021-07-15 DIAGNOSIS — K65.2 SPONTANEOUS BACTERIAL PERITONITIS: ICD-10-CM

## 2021-07-15 DIAGNOSIS — E87.1 HYPO-OSMOLALITY AND HYPONATREMIA: ICD-10-CM

## 2021-07-15 PROBLEM — C61 MALIGNANT NEOPLASM OF PROSTATE: Chronic | Status: ACTIVE | Noted: 2021-01-13

## 2021-07-15 LAB
ALBUMIN FLD-MCNC: 1.1 G/DL — SIGNIFICANT CHANGE UP
ALBUMIN SERPL ELPH-MCNC: 2 G/DL — LOW (ref 3.3–5)
ALP SERPL-CCNC: 197 U/L — HIGH (ref 40–120)
ALT FLD-CCNC: 8 U/L — SIGNIFICANT CHANGE UP (ref 4–41)
ANION GAP SERPL CALC-SCNC: 14 MMOL/L — SIGNIFICANT CHANGE UP (ref 7–14)
APTT BLD: 33 SEC — SIGNIFICANT CHANGE UP (ref 27–36.3)
AST SERPL-CCNC: 28 U/L — SIGNIFICANT CHANGE UP (ref 4–40)
BILIRUB SERPL-MCNC: 0.7 MG/DL — SIGNIFICANT CHANGE UP (ref 0.2–1.2)
BLOOD GAS VENOUS COMPREHENSIVE RESULT: SIGNIFICANT CHANGE UP
BUN SERPL-MCNC: 14 MG/DL — SIGNIFICANT CHANGE UP (ref 7–23)
CALCIUM SERPL-MCNC: 8.3 MG/DL — LOW (ref 8.4–10.5)
CHLORIDE SERPL-SCNC: 99 MMOL/L — SIGNIFICANT CHANGE UP (ref 98–107)
CO2 SERPL-SCNC: 19 MMOL/L — LOW (ref 22–31)
CREAT ?TM UR-MCNC: 93 MG/DL — SIGNIFICANT CHANGE UP
CREAT SERPL-MCNC: 0.8 MG/DL — SIGNIFICANT CHANGE UP (ref 0.5–1.3)
FIBRINOGEN PPP-MCNC: 652 MG/DL — HIGH (ref 290–520)
FLUID SEGMENTED GRANULOCYTES: 45 % — SIGNIFICANT CHANGE UP
GLUCOSE FLD-MCNC: 121 MG/DL — SIGNIFICANT CHANGE UP
GLUCOSE SERPL-MCNC: 81 MG/DL — SIGNIFICANT CHANGE UP (ref 70–99)
GRAM STN FLD: SIGNIFICANT CHANGE UP
HCT VFR BLD CALC: 24 % — LOW (ref 39–50)
HGB BLD-MCNC: 7.5 G/DL — LOW (ref 13–17)
INR BLD: 1.36 RATIO — HIGH (ref 0.88–1.16)
LDH SERPL L TO P-CCNC: 125 U/L — SIGNIFICANT CHANGE UP
LYMPHOCYTES # FLD: 8 % — SIGNIFICANT CHANGE UP
MAGNESIUM SERPL-MCNC: 2.1 MG/DL — SIGNIFICANT CHANGE UP (ref 1.6–2.6)
MCHC RBC-ENTMCNC: 22.4 PG — LOW (ref 27–34)
MCHC RBC-ENTMCNC: 31.3 GM/DL — LOW (ref 32–36)
MCV RBC AUTO: 71.6 FL — LOW (ref 80–100)
MONOS+MACROS # FLD: 47 % — SIGNIFICANT CHANGE UP
NRBC # BLD: 0 /100 WBCS — SIGNIFICANT CHANGE UP
NRBC # FLD: 0 K/UL — SIGNIFICANT CHANGE UP
OSMOLALITY UR: 533 MOSM/KG — SIGNIFICANT CHANGE UP (ref 50–1200)
PLATELET # BLD AUTO: 230 K/UL — SIGNIFICANT CHANGE UP (ref 150–400)
POTASSIUM SERPL-MCNC: 3.8 MMOL/L — SIGNIFICANT CHANGE UP (ref 3.5–5.3)
POTASSIUM SERPL-SCNC: 3.8 MMOL/L — SIGNIFICANT CHANGE UP (ref 3.5–5.3)
PROT FLD-MCNC: 2.1 G/DL — SIGNIFICANT CHANGE UP
PROT SERPL-MCNC: 5 G/DL — LOW (ref 6–8.3)
PROTHROM AB SERPL-ACNC: 15.3 SEC — HIGH (ref 10.6–13.6)
RBC # BLD: 3.35 M/UL — LOW (ref 4.2–5.8)
RBC # FLD: 24.9 % — HIGH (ref 10.3–14.5)
SODIUM SERPL-SCNC: 132 MMOL/L — LOW (ref 135–145)
SODIUM UR-SCNC: <20 MMOL/L — SIGNIFICANT CHANGE UP
SPECIMEN SOURCE: SIGNIFICANT CHANGE UP
WBC # BLD: 46.77 K/UL — CRITICAL HIGH (ref 3.8–10.5)
WBC # FLD AUTO: 46.77 K/UL — CRITICAL HIGH (ref 3.8–10.5)

## 2021-07-15 PROCEDURE — 99223 1ST HOSP IP/OBS HIGH 75: CPT

## 2021-07-15 PROCEDURE — 93970 EXTREMITY STUDY: CPT | Mod: 26

## 2021-07-15 PROCEDURE — 74177 CT ABD & PELVIS W/CONTRAST: CPT | Mod: 26

## 2021-07-15 RX ORDER — LOSARTAN POTASSIUM 100 MG/1
1 TABLET, FILM COATED ORAL
Qty: 0 | Refills: 0 | DISCHARGE

## 2021-07-15 RX ORDER — ACETAMINOPHEN 500 MG
650 TABLET ORAL EVERY 6 HOURS
Refills: 0 | Status: DISCONTINUED | OUTPATIENT
Start: 2021-07-15 | End: 2021-07-23

## 2021-07-15 RX ORDER — RIVAROXABAN 15 MG-20MG
1 KIT ORAL
Qty: 0 | Refills: 0 | DISCHARGE

## 2021-07-15 RX ORDER — PIPERACILLIN AND TAZOBACTAM 4; .5 G/20ML; G/20ML
3.38 INJECTION, POWDER, LYOPHILIZED, FOR SOLUTION INTRAVENOUS EVERY 8 HOURS
Refills: 0 | Status: COMPLETED | OUTPATIENT
Start: 2021-07-15 | End: 2021-07-22

## 2021-07-15 RX ORDER — LANOLIN ALCOHOL/MO/W.PET/CERES
3 CREAM (GRAM) TOPICAL AT BEDTIME
Refills: 0 | Status: DISCONTINUED | OUTPATIENT
Start: 2021-07-15 | End: 2021-07-23

## 2021-07-15 RX ORDER — VANCOMYCIN HCL 1 G
1000 VIAL (EA) INTRAVENOUS EVERY 12 HOURS
Refills: 0 | Status: DISCONTINUED | OUTPATIENT
Start: 2021-07-15 | End: 2021-07-15

## 2021-07-15 RX ORDER — CEFTRIAXONE 500 MG/1
2000 INJECTION, POWDER, FOR SOLUTION INTRAMUSCULAR; INTRAVENOUS EVERY 24 HOURS
Refills: 0 | Status: DISCONTINUED | OUTPATIENT
Start: 2021-07-15 | End: 2021-07-15

## 2021-07-15 RX ORDER — ENOXAPARIN SODIUM 100 MG/ML
60 INJECTION SUBCUTANEOUS
Refills: 0 | Status: DISCONTINUED | OUTPATIENT
Start: 2021-07-15 | End: 2021-07-18

## 2021-07-15 RX ORDER — PIPERACILLIN AND TAZOBACTAM 4; .5 G/20ML; G/20ML
3.38 INJECTION, POWDER, LYOPHILIZED, FOR SOLUTION INTRAVENOUS ONCE
Refills: 0 | Status: COMPLETED | OUTPATIENT
Start: 2021-07-15 | End: 2021-07-15

## 2021-07-15 RX ADMIN — Medication 250 MILLIGRAM(S): at 03:58

## 2021-07-15 RX ADMIN — PIPERACILLIN AND TAZOBACTAM 200 GRAM(S): 4; .5 INJECTION, POWDER, LYOPHILIZED, FOR SOLUTION INTRAVENOUS at 03:20

## 2021-07-15 RX ADMIN — PIPERACILLIN AND TAZOBACTAM 25 GRAM(S): 4; .5 INJECTION, POWDER, LYOPHILIZED, FOR SOLUTION INTRAVENOUS at 12:17

## 2021-07-15 RX ADMIN — PIPERACILLIN AND TAZOBACTAM 25 GRAM(S): 4; .5 INJECTION, POWDER, LYOPHILIZED, FOR SOLUTION INTRAVENOUS at 19:52

## 2021-07-15 RX ADMIN — ENOXAPARIN SODIUM 60 MILLIGRAM(S): 100 INJECTION SUBCUTANEOUS at 18:48

## 2021-07-15 RX ADMIN — ENOXAPARIN SODIUM 60 MILLIGRAM(S): 100 INJECTION SUBCUTANEOUS at 05:28

## 2021-07-15 NOTE — H&P ADULT - NSHPLABSRESULTS_GEN_ALL_CORE
07-14    133<L>  |  99  |  17  ----------------------------<  110<H>  4.3   |  22  |  0.79    Ca    8.7      14 Jul 2021 21:43    TPro  5.7<L>  /  Alb  2.8<L>  /  TBili  0.6  /  DBili  x   /  AST  27  /  ALT  10  /  AlkPhos  219<H>  07-14                        8.6    38.04 )-----------( 272      ( 14 Jul 2021 21:43 )             27.0   LIVER FUNCTIONS - ( 14 Jul 2021 21:43 )  Alb: 2.8 g/dL / Pro: 5.7 g/dL / ALK PHOS: 219 U/L / ALT: 10 U/L / AST: 27 U/L / GGT: x           PT/INR - ( 14 Jul 2021 21:43 )   PT: 15.2 sec;   INR: 1.34 ratio       PTT - ( 14 Jul 2021 21:43 )  PTT:32.6 sec

## 2021-07-15 NOTE — H&P ADULT - ASSESSMENT
Mr. Cohn is a 66YOM with active colorectal cancer (mets to liver first dx'd Dec 2020, started chemo in Feb 2021) and DVTs on Xarelto presented to the ED for fever and altered mental status, now with ascitic fluid consistent with SBP. Admitted for sepsis 2/2 SBP (spontaneous vs secondary to intrucmentation). Will need inpatient broad spectrum abx.

## 2021-07-15 NOTE — H&P ADULT - NSHPREVIEWOFSYSTEMS_GEN_ALL_CORE
REVIEW OF SYSTEMS:    CONSTITUTIONAL: No chills  EYES/ENT: No visual changes;  No dysphagia  NECK: No pain or stiffness  RESPIRATORY: No cough, wheezing, hemoptysis; No shortness of breath  CARDIOVASCULAR: No chest pain or palpitations; No lower extremity edema  GASTROINTESTINAL: No abdominal or epigastric pain. No nausea, vomiting, or hematemesis; No diarrhea or constipation. No melena or hematochezia.  GENITOURINARY: No dysuria, frequency or hematuria  NEUROLOGICAL: No numbness  SKIN: No itching, burning, rashes, or lesions   PSYCH: No auditory or visual hallucinations  All other review of systems is negative unless indicated above.

## 2021-07-15 NOTE — H&P ADULT - PROBLEM SELECTOR PLAN 1
Tachy and wbc with SBP. Currently clinically compensated  -Started broad spectrum abx (Vanc/Zosyn covering MRSA, Pseudomonas and anerobics)  -May narrow after culture speciation/sensitivity  -Hold off albumin as no renal failure  -Monitor renal function and output closely

## 2021-07-15 NOTE — PHARMACOTHERAPY INTERVENTION NOTE - COMMENTS
Medication history is complete and was verified with patient's spouse and [facility]. Medication list updated in Outpatient Medication Record (OMR). Please call spectra [phone] if you have any questions.

## 2021-07-15 NOTE — H&P ADULT - PROBLEM SELECTOR PLAN 5
Has mets to liver with active chemo sessions  -May need to reach out to outpatient oncology in AM for more info

## 2021-07-15 NOTE — H&P ADULT - NSHPPHYSICALEXAM_GEN_ALL_CORE
PHYSICAL EXAM:  GENERAL: NAD, well-developed, well-nourished  HEAD:  Atraumatic, Normocephalic  EYES: EOMI, PERRL, conjunctiva and sclera clear  NECK: Supple, No JVD  CHEST/LUNG: Clear to auscultation bilaterally; No wheezes, rales or rhonchi  HEART: Regular rate and rhythm; No murmurs, rubs, or gallops, (+)S1, S2  ABDOMEN: Soft, Nontender, Nondistended; Normal Bowel sounds, +hepatomegaly  EXTREMITIES:  2+ Peripheral Pulses, No clubbing, cyanosis, or edema  PSYCH: normal mood and affect  NEUROLOGY: AAOx3, non-focal  SKIN: No rashes or lesions

## 2021-07-15 NOTE — H&P ADULT - PROBLEM SELECTOR PLAN 3
Decreased with IVF in ED. Most likely SIADH vs hypervolemia from liver cirrhosis/disease  -Fluid restrict for now unless patient becomes hypotensive  -Urine Na/osm pending

## 2021-07-15 NOTE — H&P ADULT - HISTORY OF PRESENT ILLNESS
Mr. 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 He was first dx'd in Dec 2021 and started on chemo Feb 2021 and last chemo on Monday. He has known ascitic fluid without cirrhosis but known to have metastasis to the liver. He had 2 prior paracentesis in the past (prior to today): most recently 7/5 for increased abdominal girth. On Monday after his chemotherapy session, he started having fever. Yesterday, he became more confused per wife at bedside and had a CT scan outpatient at [facility] and instructed to come to the ED for evaluation of potential peritonitis. Currently oriented and denies abdominal pain, diarrhea, cough, dyspnea, dysuria.    In the ED, fever 99.6/  but otherwise vitals stable. Labs with mild hyponatremia and severe leukocytosis. Ascitic fluid consistent with SBP (ANC>250). Received 1L IVF,

## 2021-07-15 NOTE — PROGRESS NOTE ADULT - SUBJECTIVE AND OBJECTIVE BOX
Name of Patient : JAELYN CLARKE  MRN: [id number]  DATE OF SERVICE: 07-15-21     Subjective: Patient seen and examined. No new events except as noted.   feeling better  abdominal pain on palpation  afebrile here   S/P paracentesis     REVIEW OF SYSTEMS:    CONSTITUTIONAL: No weakness, fevers or chills  EYES/ENT: No visual changes;  No vertigo or throat pain   NECK: No pain or stiffness  RESPIRATORY: No cough, wheezing, hemoptysis; No shortness of breath  CARDIOVASCULAR: No chest pain or palpitations  GASTROINTESTINAL: + abdominal pain   GENITOURINARY: No dysuria, frequency or hematuria  NEUROLOGICAL: No numbness or weakness  SKIN: No itching, burning, rashes, or lesions   All other review of systems is negative unless indicated above.    MEDICATIONS:  MEDICATIONS  (STANDING):  enoxaparin Injectable 60 milliGRAM(s) SubCutaneous two times a day  piperacillin/tazobactam IVPB.. 3.375 Gram(s) IV Intermittent every 8 hours      PHYSICAL EXAM:  T(C): 37.2 (07-15-21 @ 22:14), Max: 37.2 (07-15-21 @ 22:14)  HR: 75 (07-15-21 @ 22:14) (73 - 98)  BP: 107/69 (07-15-21 @ 22:14) (103/66 - 127/73)  RR: 18 (07-15-21 @ 22:14) (18 - 18)  SpO2: 100% (07-15-21 @ 22:14) (98% - 100%)  Wt(kg): --  I&O's Summary    Height (cm): 165.1 (07-15 @ 22:14)  Weight (kg): 64.5 (07-15 @ 22:14)  BMI (kg/m2): 23.7 (07-15 @ 22:14)  BSA (m2): 1.71 (07-15 @ 22:14)    Appearance: Normal	  HEENT:  PERRLA   Lymphatic: No lymphadenopathy   Cardiovascular: Normal S1 S2, no JVD  Respiratory: normal effort , clear  Gastrointestinal: pain on palpation   Skin: No rashes,  warm to touch  Psychiatry:  Mood & affect appropriate  Musculuskeletal: No edema      All labs, Imaging and EKGs personally reviewed       Culture - Body Fluid with Gram Stain (collected 07-15-21 @ 02:43)  Source: .Body Fluid Peritoneal Fluid  Gram Stain (07-15-21 @ 05:30):    polymorphonuclear leukocytes seen    No organisms seen    by cytocentrifuge                            7.5    46.77 )-----------( 230      ( 15 Jul 2021 07:26 )             24.0               07-15    132<L>  |  99  |  14  ----------------------------<  81  3.8   |  19<L>  |  0.80    Ca    8.3<L>      15 Jul 2021 07:26  Mg     2.10     07-15    TPro  5.0<L>  /  Alb  2.0<L>  /  TBili  0.7  /  DBili  x   /  AST  28  /  ALT  8   /  AlkPhos  197<H>  07-15    PT/INR - ( 15 Jul 2021 07:26 )   PT: 15.3 sec;   INR: 1.36 ratio         PTT - ( 15 Jul 2021 07:26 )  PTT:33.0 sec

## 2021-07-16 LAB
ALBUMIN SERPL ELPH-MCNC: 2.3 G/DL — LOW (ref 3.3–5)
ALP SERPL-CCNC: 270 U/L — HIGH (ref 40–120)
ALT FLD-CCNC: 10 U/L — SIGNIFICANT CHANGE UP (ref 4–41)
ANION GAP SERPL CALC-SCNC: 12 MMOL/L — SIGNIFICANT CHANGE UP (ref 7–14)
AST SERPL-CCNC: 37 U/L — SIGNIFICANT CHANGE UP (ref 4–40)
BASOPHILS # BLD AUTO: 0 K/UL — SIGNIFICANT CHANGE UP (ref 0–0.2)
BASOPHILS NFR BLD AUTO: 0 % — SIGNIFICANT CHANGE UP (ref 0–2)
BILIRUB SERPL-MCNC: 0.9 MG/DL — SIGNIFICANT CHANGE UP (ref 0.2–1.2)
BLD GP AB SCN SERPL QL: NEGATIVE — SIGNIFICANT CHANGE UP
BLOOD GAS VENOUS COMPREHENSIVE RESULT: SIGNIFICANT CHANGE UP
BUN SERPL-MCNC: 13 MG/DL — SIGNIFICANT CHANGE UP (ref 7–23)
CALCIUM SERPL-MCNC: 8.7 MG/DL — SIGNIFICANT CHANGE UP (ref 8.4–10.5)
CHLORIDE SERPL-SCNC: 100 MMOL/L — SIGNIFICANT CHANGE UP (ref 98–107)
CO2 SERPL-SCNC: 22 MMOL/L — SIGNIFICANT CHANGE UP (ref 22–31)
COVID-19 SPIKE DOMAIN AB INTERP: POSITIVE
COVID-19 SPIKE DOMAIN ANTIBODY RESULT: 6.92 U/ML — HIGH
CREAT SERPL-MCNC: 0.86 MG/DL — SIGNIFICANT CHANGE UP (ref 0.5–1.3)
EOSINOPHIL # BLD AUTO: 0 K/UL — SIGNIFICANT CHANGE UP (ref 0–0.5)
EOSINOPHIL NFR BLD AUTO: 0 % — SIGNIFICANT CHANGE UP (ref 0–6)
FERRITIN SERPL-MCNC: 979 NG/ML — HIGH (ref 30–400)
FOLATE SERPL-MCNC: 20 NG/ML — HIGH (ref 3.1–17.5)
GLUCOSE SERPL-MCNC: 127 MG/DL — HIGH (ref 70–99)
HCT VFR BLD CALC: 28.1 % — LOW (ref 39–50)
HGB BLD-MCNC: 8.5 G/DL — LOW (ref 13–17)
IANC: 66.78 K/UL — HIGH (ref 1.5–8.5)
IRON SATN MFR SERPL: 37 % — SIGNIFICANT CHANGE UP (ref 14–50)
IRON SATN MFR SERPL: 88 UG/DL — SIGNIFICANT CHANGE UP (ref 45–165)
LDH SERPL L TO P-CCNC: 577 U/L — HIGH (ref 135–225)
LYMPHOCYTES # BLD AUTO: 1.23 K/UL — SIGNIFICANT CHANGE UP (ref 1–3.3)
LYMPHOCYTES # BLD AUTO: 1.7 % — LOW (ref 13–44)
MAGNESIUM SERPL-MCNC: 2.2 MG/DL — SIGNIFICANT CHANGE UP (ref 1.6–2.6)
MCHC RBC-ENTMCNC: 22 PG — LOW (ref 27–34)
MCHC RBC-ENTMCNC: 30.2 GM/DL — LOW (ref 32–36)
MCV RBC AUTO: 72.6 FL — LOW (ref 80–100)
MONOCYTES # BLD AUTO: 0.65 K/UL — SIGNIFICANT CHANGE UP (ref 0–0.9)
MONOCYTES NFR BLD AUTO: 0.9 % — LOW (ref 2–14)
NEUTROPHILS # BLD AUTO: 70.1 K/UL — HIGH (ref 1.8–7.4)
NEUTROPHILS NFR BLD AUTO: 94.8 % — HIGH (ref 43–77)
PHOSPHATE SERPL-MCNC: 3 MG/DL — SIGNIFICANT CHANGE UP (ref 2.5–4.5)
PLATELET # BLD AUTO: 282 K/UL — SIGNIFICANT CHANGE UP (ref 150–400)
POTASSIUM SERPL-MCNC: 4 MMOL/L — SIGNIFICANT CHANGE UP (ref 3.5–5.3)
POTASSIUM SERPL-SCNC: 4 MMOL/L — SIGNIFICANT CHANGE UP (ref 3.5–5.3)
PROT SERPL-MCNC: 5.7 G/DL — LOW (ref 6–8.3)
RBC # BLD: 3.87 M/UL — LOW (ref 4.2–5.8)
RBC # FLD: 25.6 % — HIGH (ref 10.3–14.5)
RH IG SCN BLD-IMP: POSITIVE — SIGNIFICANT CHANGE UP
SARS-COV-2 IGG+IGM SERPL QL IA: 6.92 U/ML — HIGH
SARS-COV-2 IGG+IGM SERPL QL IA: POSITIVE
SODIUM SERPL-SCNC: 134 MMOL/L — LOW (ref 135–145)
TIBC SERPL-MCNC: 239 UG/DL — SIGNIFICANT CHANGE UP (ref 220–430)
UIBC SERPL-MCNC: 151 UG/DL — SIGNIFICANT CHANGE UP (ref 110–370)
WBC # BLD: 72.64 K/UL — CRITICAL HIGH (ref 3.8–10.5)
WBC # FLD AUTO: 72.64 K/UL — CRITICAL HIGH (ref 3.8–10.5)

## 2021-07-16 PROCEDURE — 88112 CYTOPATH CELL ENHANCE TECH: CPT | Mod: 26

## 2021-07-16 PROCEDURE — 99232 SBSQ HOSP IP/OBS MODERATE 35: CPT | Mod: GC

## 2021-07-16 PROCEDURE — 99222 1ST HOSP IP/OBS MODERATE 55: CPT | Mod: GC

## 2021-07-16 RX ORDER — SODIUM CHLORIDE 9 MG/ML
1000 INJECTION INTRAMUSCULAR; INTRAVENOUS; SUBCUTANEOUS
Refills: 0 | Status: DISCONTINUED | OUTPATIENT
Start: 2021-07-16 | End: 2021-07-18

## 2021-07-16 RX ORDER — CHLORHEXIDINE GLUCONATE 213 G/1000ML
1 SOLUTION TOPICAL
Refills: 0 | Status: DISCONTINUED | OUTPATIENT
Start: 2021-07-16 | End: 2021-07-23

## 2021-07-16 RX ADMIN — Medication 3 MILLIGRAM(S): at 21:18

## 2021-07-16 RX ADMIN — SODIUM CHLORIDE 50 MILLILITER(S): 9 INJECTION INTRAMUSCULAR; INTRAVENOUS; SUBCUTANEOUS at 15:51

## 2021-07-16 RX ADMIN — ENOXAPARIN SODIUM 60 MILLIGRAM(S): 100 INJECTION SUBCUTANEOUS at 17:51

## 2021-07-16 RX ADMIN — PIPERACILLIN AND TAZOBACTAM 25 GRAM(S): 4; .5 INJECTION, POWDER, LYOPHILIZED, FOR SOLUTION INTRAVENOUS at 05:12

## 2021-07-16 RX ADMIN — ENOXAPARIN SODIUM 60 MILLIGRAM(S): 100 INJECTION SUBCUTANEOUS at 05:13

## 2021-07-16 RX ADMIN — PIPERACILLIN AND TAZOBACTAM 25 GRAM(S): 4; .5 INJECTION, POWDER, LYOPHILIZED, FOR SOLUTION INTRAVENOUS at 13:38

## 2021-07-16 RX ADMIN — CHLORHEXIDINE GLUCONATE 1 APPLICATION(S): 213 SOLUTION TOPICAL at 11:56

## 2021-07-16 RX ADMIN — Medication 650 MILLIGRAM(S): at 23:50

## 2021-07-16 RX ADMIN — PIPERACILLIN AND TAZOBACTAM 25 GRAM(S): 4; .5 INJECTION, POWDER, LYOPHILIZED, FOR SOLUTION INTRAVENOUS at 21:18

## 2021-07-16 NOTE — CONSULT NOTE ADULT - ATTENDING COMMENTS
Patient with right ascending colon cancer with metastatic disease in liver. left biliary ducts show dilation and right peritoneum has irregularity suggesting peritonitis of carcinomatosis. The patient has had several paracenteses but it is not clear he has cirrhosis or portal hypertension. It is possible , but rare, for metastatic disease to result in portal hypertension . suggest doppler study of hepatic/mesenteric vessels to assure no thrombosis.  Patient on Zosyn for ascites infection. monitor renal function .  Suggest albumin infusion if any deterioration in renal function. Assess for possible causes of cirrhosis. Discuss with oncology if diagnosis of malignant ascites would impact treatment plan. If so consider ascites cytology collection.  Supplement nutrition.

## 2021-07-16 NOTE — PROGRESS NOTE ADULT - SUBJECTIVE AND OBJECTIVE BOX
Patient seen and examined at bedside. feels well. aox3     MEDICATIONS  (STANDING):  chlorhexidine 2% Cloths 1 Application(s) Topical <User Schedule>  enoxaparin Injectable 60 milliGRAM(s) SubCutaneous two times a day  piperacillin/tazobactam IVPB.. 3.375 Gram(s) IV Intermittent every 8 hours    MEDICATIONS  (PRN):  acetaminophen   Tablet .. 650 milliGRAM(s) Oral every 6 hours PRN Temp greater or equal to 38.5C (101.3F), Mild Pain (1 - 3)  aluminum hydroxide/magnesium hydroxide/simethicone Suspension 30 milliLiter(s) Oral every 4 hours PRN Dyspepsia  melatonin 3 milliGRAM(s) Oral at bedtime PRN Insomnia        Vital Signs Last 24 Hrs  T(C): 37.1 (16 Jul 2021 05:02), Max: 37.2 (15 Jul 2021 22:14)  T(F): 98.7 (16 Jul 2021 05:02), Max: 99 (15 Jul 2021 22:14)  HR: 79 (16 Jul 2021 05:02) (73 - 98)  BP: 116/71 (16 Jul 2021 05:02) (103/66 - 127/73)  BP(mean): --  RR: 18 (16 Jul 2021 05:02) (18 - 18)  SpO2: 100% (16 Jul 2021 05:02) (100% - 100%)      PHYSICAL EXAM:     GENERAL:  Appears stated age, well-groomed  HEENT:  NC/AT,   CHEST:  CTA b/l  HEART:  S1 s2+   ABDOMEN:  Soft, distended   EXTEREMITIES:  no cyanosis,clubbing or edema   NEURO:  Alert, oriented, no asterixis, aox3                             7.5    46.77 )-----------( 230      ( 15 Jul 2021 07:26 )             24.0       07-15    132<L>  |  99  |  14  ----------------------------<  81  3.8   |  19<L>  |  0.80    Ca    8.3<L>      15 Jul 2021 07:26  Mg     2.10     07-15    TPro  5.0<L>  /  Alb  2.0<L>  /  TBili  0.7  /  DBili  x   /  AST  28  /  ALT  8   /  AlkPhos  197<H>  07-15

## 2021-07-16 NOTE — CONSULT NOTE ADULT - ASSESSMENT
66YOM with active colorectal cancer (mets to liver first dx'd Dec 2020, started chemo in Feb 2021) and DVTs on Xarelto presented to the ED for fever x5d and altered mental status. He was first dx'd in Dec 2021 and started on chemo Feb 2021 and last chemo on Monday. He has known ascitic fluid without known cirrhosis but known to have metastasis to the liver. No other h/o liver disease. Hepatology consulted for management/workup of ascites. He had 3 prior paracentesis in the past (prior to this admission) all done [place] [facility]. SAAG > 1.1, ascitic . Concern this could be malignant ascites and intraabdominal infection due to possible necrotic liver mets. Ascitic fluid cx 7/15 with no bacterial growth + no organisms on gram stain.    Impression:  #colon cancer with liver mets- follows at [facility], last chemo 7/12  #elevated alk phos- likely 2/2 infiltrative disease from liver mets  #ascitic fluid with elevated PMNs (652) c/f peritonitis       Recommendations:  - please order MRI abd w wo cont to eval for necrotic liver mets  - defer abx per ID recs  - f/u cytology from para 7/14  - send viral hepatitis serologies: Hep A IgM, Hep B SAg IgM, Hep C Ab, Hep E Ab,  CMV IgM & DNA PCR, EBV IgM & DNA PCR, HSV IgM & DNA PCR   - recommend nutrition consult, cachectic and sarcopenic on exam  - rest of care per primary team      Sangita Monroe MD PGY-4  Gastroenterology Fellow  Pager #[phone] (PIERO) or [phone] (NS)  Available on [employer] Teams.  Please contact on-call GI fellow via answering service ([phone]) after 5pm and before 8am, and on weekends.
66 year old man with metastatic colon cancer with ascites    No acute surgical intervention will be offered at this time  Please continue medical management of SBP  Patient may need therapeutic pericentesis in near future  Surgical Oncology will follow    Discussed with attending surgeon Dr. Jluis Trujillo    Surgical Oncology  D team  [phone]
# SEPSIS, ALTERED MENTATION  # SPONTANEOUS BACTERIAL PERITONITIS    ·	Presented with sepsis secondary to SBP  ·	Paracentesis consistent with SBP  ·	On zosyn; ID following, appreciate recs  ·	Will also get CT A/P - pending  ·	Will continue to follow        # METASTATIC COLON CANCER  # LIVER METASTASES  # RECURRENT ASCITES  # DVT 2/2021, ON XARELTO OUTPATIENT    ·	Ascites could be malignant; recommend cytology assessment on para fluid  ·	On outpatient chemotherapy, hold while inpatient  ·	Will update MYKEL Araujo MD  Hematology/Oncology Consultant  [facility]  Cell: [phone]  
66 m with active colorectal cancer (mets to liver first dx'd Dec 2020, started chemo in Feb 2021), DVTs on Xarelto, known ascites with no cirrhosis, last tap 7/7 now p/w fever and AMS after the last chemo (4 days ago), has abd discomfort but no cough, diarrhea, dysuria  here afebrile, WBC: 46  s/p paracentesis, WBC:652, PMN 45%    fever, tachycardia, AMS, leukocytosis to 46 in the setting of metastatic colon ca, ascites, s/p tap which is s/o peritonitis    * abd/pelvis CT with contrast  * f/u the blood and peritoneal cx  * c/w zosyn  * discontinue vanco for now, s/p a dose in ER  * monitor the WBC/diff and temp curve    The above assessment and plan was discussed with the primary team    Grace Perez MD  Pager [phone]  After 5pm and on weekends call [phone]              
66 year old man with metastatic colon cancer with ascites  - no general surgery intervention required at this time  - appreciate heme/onc recommendations  - continue supportive care per primary team

## 2021-07-16 NOTE — PROGRESS NOTE ADULT - SUBJECTIVE AND OBJECTIVE BOX
Follow Up:  fever    Interval History: pt feels better today, no fever, WBC increased to 72 though    ROS:      All other systems negative    Constitutional: no fever, no chills  Cardiovascular:  no chest pain, no palpitation  Respiratory:  no SOB, no cough  GI:  + abd discomfort and distention, no vomiting, no diarrhea  urinary: no dysuria, no hematuria, no flank pain  musculoskeletal:  no joint pain, no joint swelling  skin:  no rash  neurology:  no headache, no seizure      Allergies  No Known Allergies        ANTIMICROBIALS:  piperacillin/tazobactam IVPB.. 3.375 every 8 hours      OTHER MEDS:  acetaminophen   Tablet .. 650 milliGRAM(s) Oral every 6 hours PRN  aluminum hydroxide/magnesium hydroxide/simethicone Suspension 30 milliLiter(s) Oral every 4 hours PRN  chlorhexidine 2% Cloths 1 Application(s) Topical <User Schedule>  enoxaparin Injectable 60 milliGRAM(s) SubCutaneous two times a day  melatonin 3 milliGRAM(s) Oral at bedtime PRN      Vital Signs Last 24 Hrs  T(C): 36.9 (16 Jul 2021 13:37), Max: 37.2 (15 Jul 2021 22:14)  T(F): 98.4 (16 Jul 2021 13:37), Max: 99 (15 Jul 2021 22:14)  HR: 86 (16 Jul 2021 13:37) (75 - 98)  BP: 101/62 (16 Jul 2021 13:37) (101/62 - 127/73)  BP(mean): --  RR: 16 (16 Jul 2021 13:37) (16 - 18)  SpO2: 98% (16 Jul 2021 13:37) (98% - 100%)    Physical Exam:  General:    NAD,  non toxic  Cardio:     regular S1, S2,  no murmur  Respiratory:    clear b/l,    no wheezing  abd:   distended with ascites but  soft,   BS +,   no tenderness  :   no CVAT,  no suprapubic tenderness,   no  shetty  Musculoskeletal:   no joint swelling  vascular: no phlebitis, R chest port  Skin:    no rash                        8.5    72.64 )-----------( 282      ( 16 Jul 2021 10:08 )             28.1       07-16    134<L>  |  100  |  13  ----------------------------<  127<H>  4.0   |  22  |  0.86    Ca    8.7      16 Jul 2021 10:08  Phos  3.0     07-16  Mg     2.20     07-16    TPro  5.7<L>  /  Alb  2.3<L>  /  TBili  0.9  /  DBili  x   /  AST  37  /  ALT  10  /  AlkPhos  270<H>  07-16          MICROBIOLOGY:  v  .Blood Port Device  07-15-21   No growth to date.  --  --      .Blood Blood-Peripheral  07-15-21   No growth to date.  --  --      .Body Fluid Peritoneal Fluid  07-15-21   No growth  --    polymorphonuclear leukocytes seen  No organisms seen  by cytocentrifuge          Rapid RVP Result: NotDetec (07-14 @ 23:27)        RADIOLOGY:  Images independently visualized and reviewed personally, findings as below  < from: CT Abdomen and Pelvis w/ IV Cont (07.15.21 @ 17:48) >  IMPRESSION:  *  Colorectal cancer of the ascending colon with extensive metastatic disease to the liver.  *  Large volume of ascites with nodular thickening along the right parietal peritoneum which may be secondary to peritoneal carcinomatosis vs peritonitis .    < end of copied text >

## 2021-07-16 NOTE — CHART NOTE - NSCHARTNOTEFT_GEN_A_CORE
d/w medical attending Dr. Villa pt with elevated lactate 3.5: ok to add IVF 50ml/hr despite pt having ascites.  will continue to monitor closely.  Dr. villa in agreement with plan  .

## 2021-07-16 NOTE — CONSULT NOTE ADULT - SUBJECTIVE AND OBJECTIVE BOX
Date of service: 7/16/2021    Patient is a 66y year old male with PMHx of   HPI:  Mr. 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 He was first dx'd in Dec 2021 and started on chemo Feb 2021 and last chemo on Monday. He has known ascitic fluid without cirrhosis but known to have metastasis to the liver. He had 2 prior paracentesis in the past (prior to today): most recently 7/5 for increased abdominal girth. On Monday after his chemotherapy session, he started having fever. Yesterday, he became more confused per wife at bedside and had a CT scan outpatient at [facility] [place] and instructed to come to the ED for evaluation of potential peritonitis. Currently oriented and denies abdominal pain, diarrhea, cough, dyspnea, dysuria.    In the ED, fever 99.6/  but otherwise vitals stable. Labs with mild hyponatremia and severe leukocytosis. Ascitic fluid consistent with SBP (ANC>250). Received 1L IVF, (15 Jul 2021 02:56)    I was consulted for colon cancer, metastatic to liver with ascites. The patient states he was diagnosed in 12/2020; and has been undergoing chemotherapy. He currently denies any nausea, vomiting ,fever or chills. He is concerned about the abdominal distension and is asking if there is any surgical intervention for the ascites. He is tolerating PO intake. having GI function.       PMH  HTN (hypertension)    Prostate cancer    Colorectal cancer      PSH  No significant past surgical history    S/P TURP (transurethral resection of prostate)      MEDS    Allergies    No Known Allergies    Intolerances        Social    Physical Exam  T(C): 36.9 (07-16-21 @ 13:37), Max: 37.2 (07-15-21 @ 22:14)  HR: 86 (07-16-21 @ 13:37) (75 - 98)  BP: 101/62 (07-16-21 @ 13:37) (101/62 - 127/73)  RR: 16 (07-16-21 @ 13:37) (16 - 18)  SpO2: 98% (07-16-21 @ 13:37) (98% - 100%)  Wt(kg): --  Tmax: T(C): , Max: 37.2 (07-15-21 @ 22:14)  Wt(kg): --    Gen: No apparent distress, alert and oriented x3  HEENT: normocephalic, atraumatic, no scleral icterus  Pulm: Airway patent  Abd: Soft, distended with fluid, not tender. No rebound, no guarding. Lower midline incision for prostate cancer.      07-16-21  -  07-16-21  --------------------------------------------------------  IN:    Oral Fluid: 500 mL  Total IN: 500 mL    OUT:    Voided (mL): 200 mL  Total OUT: 200 mL    Total NET: 300 mL          Labs:                        8.5    72.64 )-----------( 282      ( 16 Jul 2021 10:08 )             28.1     07-16    134<L>  |  100  |  13  ----------------------------<  127<H>  4.0   |  22  |  0.86    Ca    8.7      16 Jul 2021 10:08  Phos  3.0     07-16  Mg     2.20     07-16    TPro  5.7<L>  /  Alb  2.3<L>  /  TBili  0.9  /  DBili  x   /  AST  37  /  ALT  10  /  AlkPhos  270<H>  07-16    PT/INR - ( 15 Jul 2021 07:26 )   PT: 15.3 sec;   INR: 1.36 ratio         PTT - ( 15 Jul 2021 07:26 )  PTT:33.0 sec          Imaging  < from: CT Abdomen and Pelvis w/ IV Cont (07.15.21 @ 17:48) >    EXAM:  CT ABDOMEN AND PELVIS IC        PROCEDURE DATE:  Jul 15 2021         INTERPRETATION:  CLINICAL INFORMATION: Leukocytosis. Colorectal cancer with metastatic disease to the liver, on chemotherapy.    COMPARISON: Chest x-ray 7/14/2021    CONTRAST/COMPLICATIONS:  IV Contrast: Omnipaque 350  90 cc administered   10 cc discarded  Oral Contrast: NONE  Complications: None reported at time of study completion    PROCEDURE:  CT of the Abdomen and Pelvis was performed.  Sagittal and coronal reformats were performed.    FINDINGS:  LOWER CHEST: Right chest wall port with the tip in the cavoatrial junction.    LIVER: Hepatomegaly.  Numerous heterogeneous bilobar hypoattenuating lesions throughout the liver. Lobular contour of the liver likely secondary to pseudocirrhosis.  BILE DUCTS: Mild intrahepatic biliary ductal dilatation, particularly in the left lobe.  GALLBLADDER: Cholelithiasis.  SPLEEN: Upper limits of normal in size, measuring 12.9 cm.  PANCREAS: Within normal limits.  ADRENALS: Within normal limits.  KIDNEYS/URETERS: No hydronephrosis. Bilateral renal cysts, measuring up to 3.0 cm in the left kidney. Additional subcentimeter hypoattenuating foci which are small to characterize.    BLADDER: The bladder is distended with excreted contrast.  REPRODUCTIVE ORGANS: Surgical clips are seen posterior to the bladder, may represent prior prostatectomy.    BOWEL: There is a 9.5 x 6.2 cm mass in the ascending colon (2-72) likely representing the patient's known colorectal cancer. Prominent loops of small bowel without evidence of a discrete transition point. Appendix is normal.  PERITONEUM: Large volume of ascites. Nodularity and enhancement along the right parietal peritoneum.  VESSELS: Atherosclerotic changes.  RETROPERITONEUM/LYMPH NODES: No lymphadenopathy.  ABDOMINAL WALL: Within normal limits.  BONES: Degenerative changes.    IMPRESSION:  *  Colorectal cancer of the ascending colon with extensive metastatic disease to the liver.  *  Large volume of ascites with nodular thickening along the right parietal peritoneum which may be secondary to peritoneal carcinomatosis vs peritonitis .    --- End of Report ---            RUBEN MCCLELLAN MD; Resident Radiology  This document has been electronically signed.  MAGALYS LI MD; Attending Radiologist  This document has been electronically signed. Jul 16 2021 12:09PM    < end of copied text >  
Reason for consult:    HPI:  Mr. 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 He was first dx'd in Dec 2021 and started on chemo Feb 2021 and last chemo on Monday. He has known ascitic fluid without cirrhosis but known to have metastasis to the liver. He had 2 prior paracentesis in the past (prior to today): most recently 7/5 for increased abdominal girth. On Monday after his chemotherapy session, he started having fever. Yesterday, he became more confused per wife at bedside and had a CT scan outpatient at [facility] [place] and instructed to come to the ED for evaluation of potential peritonitis. Currently oriented and denies abdominal pain, diarrhea, cough, dyspnea, dysuria.    In the ED, fever 99.6/  but otherwise vitals stable. Labs with mild hyponatremia and severe leukocytosis. Ascitic fluid consistent with SBP (ANC>250). Received 1L IVF, (15 Jul 2021 02:56)      PAST MEDICAL & SURGICAL HISTORY:  HTN (hypertension)    Prostate cancer    Colorectal cancer    S/P TURP (transurethral resection of prostate)        FAMILY HISTORY:  No pertinent family history in first degree relatives        Alochol: Denied  Smoking: Nonsmoker  Drug Use: Denied  Marital Status:         Allergies    No Known Allergies    Intolerances        MEDICATIONS  (STANDING):  enoxaparin Injectable 60 milliGRAM(s) SubCutaneous two times a day  piperacillin/tazobactam IVPB.. 3.375 Gram(s) IV Intermittent every 8 hours    MEDICATIONS  (PRN):  acetaminophen   Tablet .. 650 milliGRAM(s) Oral every 6 hours PRN Temp greater or equal to 38.5C (101.3F), Mild Pain (1 - 3)  aluminum hydroxide/magnesium hydroxide/simethicone Suspension 30 milliLiter(s) Oral every 4 hours PRN Dyspepsia  melatonin 3 milliGRAM(s) Oral at bedtime PRN Insomnia      ROS  Limited due to altered mentation      T(C): 36.8 (07-15-21 @ 12:17), Max: 37.6 (07-14-21 @ 19:51)  HR: 73 (07-15-21 @ 12:17) (73 - 104)  BP: 103/66 (07-15-21 @ 12:17) (103/66 - 111/75)  RR: 18 (07-15-21 @ 12:17) (16 - 18)  SpO2: 100% (07-15-21 @ 12:17) (98% - 100%)  Wt(kg): --    PE  NAD  AMS  Anicteric, MMM  RRR  CTAB  Abd soft, NT, ND  No c/c/e  No rash grossly  FROM                          7.5    46.77 )-----------( 230      ( 15 Jul 2021 07:26 )             24.0       07-15    132<L>  |  99  |  14  ----------------------------<  81  3.8   |  19<L>  |  0.80    Ca    8.3<L>      15 Jul 2021 07:26  Mg     2.10     07-15    TPro  5.0<L>  /  Alb  2.0<L>  /  TBili  0.7  /  DBili  x   /  AST  28  /  ALT  8   /  AlkPhos  197<H>  07-15  
  ----------------------------------------------------------  Interventional Radiology Brief Consult Note  -----------------------------------------------------------    Reason for Referral: Paracentesis    Clinical Summary: 66y Male with history of metastatic colorectal cancer to the liver and DVTs on Xarelto,  presented to the ED with fever and altered mental status. Patient has known ascites with paracentesis performed in the ED compatible with SBP. IR consulted for paracentesis.      Vitals:  T(C): 36.9 (07-16-21 @ 13:37), Max: 37.2 (07-15-21 @ 22:14)  HR: 86 (07-16-21 @ 13:37) (75 - 98)  BP: 101/62 (07-16-21 @ 13:37) (101/62 - 127/73)  RR: 16 (07-16-21 @ 13:37) (16 - 18)  SpO2: 98% (07-16-21 @ 13:37) (98% - 100%)    Labs:           8.5  72.64)-----(282     (07-16-21 @ 10:08)         28.1     134 | 100 | 13  --------------------< 127     (07-16-21 @ 10:08)  4.0 | 22 | 0.86       PT: 15.3<H> 07-15-21 @ 07:26  aPTT: 33.0 07-15-21 @ 07:26   INR: 1.36<H> 07-15-21 @ 07:26      Assessment: 66y male with metastatic colorectal cancer and known ascites. Paracentesis performed on 6/14 compatible with SBP. IR consulted for paracentesis.    Recommendations:  - CT abdomen/pelvis reviewed with mild to moderate ascites, would recommend followup with procedure team on Monday, 7/19 if clinically warranted.       
  Chief Complaint:  Patient is a 66y old  Male who presents with a chief complaint of Fever and confusion (16 Jul 2021 17:37)      HPI: 66YOM with active colorectal cancer (mets to liver first dx'd Dec 2020, started chemo in Feb 2021) and DVTs on Xarelto presented to the ED for fever x5d and altered mental status. He was first dx'd in Dec 2021 and started on chemo Feb 2021 and last chemo on Monday. He has known ascitic fluid without cirrhosis but known to have metastasis to the liver. No other h/o liver disease. Hepatology consulted for management/workup of ascites. He had 3 prior paracentesis in the past (prior to this admission[place] [facility] [place] Wife states has never had more than 3.5 L removed at a time. First para 12/2020, 2nd in 02/2021, 3rd 07/2021. On Monday after his chemotherapy session, he started having fever. Yesterday, he became more confused per wife at bedside and [place] [facility] [place] instructed to come to the ED for evaluation of potential peritonitis. Currently oriented and denies abdominal pain, diarrhea, N/V.     Immigrated to [place] from [place] 30-40 years ago. Non-smoker, drinks ETOH on occasion. No family h/o liver disease or GI malignancy.      Allergies:  No Known Allergies      Home Medications:    Hospital Medications:  acetaminophen   Tablet .. 650 milliGRAM(s) Oral every 6 hours PRN  aluminum hydroxide/magnesium hydroxide/simethicone Suspension 30 milliLiter(s) Oral every 4 hours PRN  chlorhexidine 2% Cloths 1 Application(s) Topical <User Schedule>  enoxaparin Injectable 60 milliGRAM(s) SubCutaneous two times a day  melatonin 3 milliGRAM(s) Oral at bedtime PRN  piperacillin/tazobactam IVPB.. 3.375 Gram(s) IV Intermittent every 8 hours  sodium chloride 0.9%. 1000 milliLiter(s) IV Continuous <Continuous>      PMHX/PSHX:  HTN (hypertension)    Prostate cancer    Colorectal cancer    No significant past surgical history    S/P TURP (transurethral resection of prostate)        Family history:  No pertinent family history in first degree relatives        Social History: see HPI    ROS: see HPI      PHYSICAL EXAM:     GENERAL:  Appears stated age, thin, cachectic, chronically ill appearing, NAD  HEENT:  NC/AT,  conjunctivae clear and pink  CHEST:  Full & symmetric excursion, no increased effort, breath sounds clear  HEART:  Regular rhythm, S1, S2, no murmur/rub/S3/S4  ABDOMEN:  Soft, non-tender, +mildly distended, normoactive bowel sounds  EXTREMITIES:  no cyanosis, clubbing or edema  SKIN:  No rash/erythema/ecchymoses/petechiae/wounds/abscess/warm/dry  NEURO:  Alert, oriented, no asterixis    Vital Signs:  Vital Signs Last 24 Hrs  T(C): 37.7 (16 Jul 2021 19:20), Max: 37.7 (16 Jul 2021 19:20)  T(F): 99.9 (16 Jul 2021 19:20), Max: 99.9 (16 Jul 2021 19:20)  HR: 90 (16 Jul 2021 18:55) (75 - 90)  BP: 112/64 (16 Jul 2021 18:55) (101/62 - 116/71)  BP(mean): --  RR: 17 (16 Jul 2021 18:55) (16 - 18)  SpO2: 100% (16 Jul 2021 18:55) (98% - 100%)  Daily Height in cm: 165.1 (15 Jul 2021 22:14)    Daily     LABS:                        8.5    72.64 )-----------( 282      ( 16 Jul 2021 10:08 )             28.1     07-16    134<L>  |  100  |  13  ----------------------------<  127<H>  4.0   |  22  |  0.86    Ca    8.7      16 Jul 2021 10:08  Phos  3.0     07-16  Mg     2.20     07-16    TPro  5.7<L>  /  Alb  2.3<L>  /  TBili  0.9  /  DBili  x   /  AST  37  /  ALT  10  /  AlkPhos  270<H>  07-16    LIVER FUNCTIONS - ( 16 Jul 2021 10:08 )  Alb: 2.3 g/dL / Pro: 5.7 g/dL / ALK PHOS: 270 U/L / ALT: 10 U/L / AST: 37 U/L / GGT: x           PT/INR - ( 15 Jul 2021 07:26 )   PT: 15.3 sec;   INR: 1.36 ratio         PTT - ( 15 Jul 2021 07:26 )  PTT:33.0 sec        Imaging:       CT A/P w IV cont 7/15:  FINDINGS:  LOWER CHEST: Right chest wall port with the tip in the cavoatrial junction.    LIVER: Hepatomegaly.  Numerous heterogeneous bilobar hypoattenuating lesions throughout the liver. Lobular contour of the liver likely secondary to pseudocirrhosis.  BILE DUCTS: Mild intrahepatic biliary ductal dilatation, particularly in the left lobe.  GALLBLADDER: Cholelithiasis.  SPLEEN: Upper limits of normal in size, measuring 12.9 cm.  PANCREAS: Within normal limits.  ADRENALS: Within normal limits.  KIDNEYS/URETERS: No hydronephrosis. Bilateral renal cysts, measuring up to 3.0 cm in the left kidney. Additional subcentimeter hypoattenuating foci which are small to characterize.    BLADDER: The bladder is distended with excreted contrast.  REPRODUCTIVE ORGANS: Surgical clips are seen posterior to the bladder, may represent prior prostatectomy.    BOWEL: There is a 9.5 x 6.2 cm mass in the ascending colon (2-72) likely representing the patient's known colorectal cancer. Prominent loops of small bowel without evidence of a discrete transition point. Appendix is normal.  PERITONEUM: Large volume of ascites. Nodularity and enhancement along the right parietal peritoneum.  VESSELS: Atherosclerotic changes.  RETROPERITONEUM/LYMPH NODES: No lymphadenopathy.  ABDOMINAL WALL: Within normal limits.  BONES: Degenerative changes.    IMPRESSION:  *  Colorectal cancer of the ascending colon with extensive metastatic disease to the liver.  *  Large volume of ascites with nodular thickening along the right parietal peritoneum which may be secondary to peritoneal carcinomatosis vs peritonitis .        
CC: Patient is a 66y old  Male who presents with a chief complaint of Fever and confusion (16 Jul 2021 16:47)        Patient is a 66y year old male with PMHx of   HPI:  Mr. 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 He was first dx'd in Dec 2021 and started on chemo Feb 2021 and last chemo on Monday. He has known ascitic fluid without cirrhosis but known to have metastasis to the liver. He had 2 prior paracentesis in the past (prior to today): most recently 7/5 for increased abdominal girth. On Monday after his chemotherapy session, he started having fever. Yesterday, he became more confused per wife at bedside and had a CT scan outpatient at [facility] [place] and instructed to come to the ED for evaluation of potential peritonitis. Currently oriented and denies abdominal pain, diarrhea, cough, dyspnea, dysuria.    In the ED, fever 99.6/  but otherwise vitals stable. Labs with mild hyponatremia and severe leukocytosis. Ascitic fluid consistent with SBP (ANC>250). Received 1L IVF, (15 Jul 2021 02:56)      PMH  HTN (hypertension)    Prostate cancer    Colorectal cancer      PSH  No significant past surgical history    S/P TURP (transurethral resection of prostate)      MEDS    Allergies    No Known Allergies    Intolerances        Social        Physical Exam  T(C): 36.9 (07-16-21 @ 13:37), Max: 37.2 (07-15-21 @ 22:14)  HR: 86 (07-16-21 @ 13:37) (75 - 98)  BP: 101/62 (07-16-21 @ 13:37) (101/62 - 127/73)  RR: 16 (07-16-21 @ 13:37) (16 - 18)  SpO2: 98% (07-16-21 @ 13:37) (98% - 100%)  Wt(kg): --  Tmax: T(C): , Max: 37.2 (07-15-21 @ 22:14)  Wt(kg): --    Gen: NAD  HEENT: normocephalic, atraumatic, no scleral icterus  Pulm: Chest rise B/L, no increased wob  Abd: Soft, Distended, NTTP, no rebound, no guarding  Ext: warm, no edema    07-16-21  -  07-16-21  --------------------------------------------------------  IN:    Oral Fluid: 500 mL  Total IN: 500 mL    OUT:    Voided (mL): 200 mL  Total OUT: 200 mL    Total NET: 300 mL          Labs:                        8.5    72.64 )-----------( 282      ( 16 Jul 2021 10:08 )             28.1     07-16    134<L>  |  100  |  13  ----------------------------<  127<H>  4.0   |  22  |  0.86    Ca    8.7      16 Jul 2021 10:08  Phos  3.0     07-16  Mg     2.20     07-16    TPro  5.7<L>  /  Alb  2.3<L>  /  TBili  0.9  /  DBili  x   /  AST  37  /  ALT  10  /  AlkPhos  270<H>  07-16    PT/INR - ( 15 Jul 2021 07:26 )   PT: 15.3 sec;   INR: 1.36 ratio         PTT - ( 15 Jul 2021 07:26 )  PTT:33.0 sec          Imaging:  < from: CT Abdomen and Pelvis w/ IV Cont (07.15.21 @ 17:48) >    EXAM:  CT ABDOMEN AND PELVIS IC        PROCEDURE DATE:  Jul 15 2021         INTERPRETATION:  CLINICAL INFORMATION: Leukocytosis. Colorectal cancer with metastatic disease to the liver, on chemotherapy.    COMPARISON: Chest x-ray 7/14/2021    CONTRAST/COMPLICATIONS:  IV Contrast: Omnipaque 350  90 cc administered   10 cc discarded  Oral Contrast: NONE  Complications: None reported at time of study completion    PROCEDURE:  CT of the Abdomen and Pelvis was performed.  Sagittal and coronal reformats were performed.    FINDINGS:  LOWER CHEST: Right chest wall port with the tip in the cavoatrial junction.    LIVER: Hepatomegaly.  Numerous heterogeneous bilobar hypoattenuating lesions throughout the liver. Lobular contour of the liver likely secondary to pseudocirrhosis.  BILE DUCTS: Mild intrahepatic biliary ductal dilatation, particularly in the left lobe.  GALLBLADDER: Cholelithiasis.  SPLEEN: Upper limits of normal in size, measuring 12.9 cm.  PANCREAS: Within normal limits.  ADRENALS: Within normal limits.  KIDNEYS/URETERS: No hydronephrosis. Bilateral renal cysts, measuring up to 3.0 cm in the left kidney. Additional subcentimeter hypoattenuating foci which are small to characterize.    BLADDER: The bladder is distended with excreted contrast.  REPRODUCTIVE ORGANS: Surgical clips are seen posterior to the bladder, may represent prior prostatectomy.    BOWEL: There is a 9.5 x 6.2 cm mass in the ascending colon (2-72) likely representing the patient's known colorectal cancer. Prominent loops of small bowel without evidence of a discrete transition point. Appendix is normal.  PERITONEUM: Large volume of ascites. Nodularity and enhancement along the right parietal peritoneum.  VESSELS: Atherosclerotic changes.  RETROPERITONEUM/LYMPH NODES: No lymphadenopathy.  ABDOMINAL WALL: Within normal limits.  BONES: Degenerative changes.    IMPRESSION:  *  Colorectal cancer of the ascending colon with extensive metastatic disease to the liver.  *  Large volume of ascites with nodular thickening along the right parietal peritoneum which may be secondary to peritoneal carcinomatosis vs peritonitis .
HPI:  Mr. 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 He was first dx'd in Dec 2021 and started on chemo Feb 2021 and last chemo on Monday. He has known ascitic fluid without cirrhosis but known to have metastasis to the liver. He had 2 prior paracentesis in the past (prior to today): most recently 7/5 for increased abdominal girth. On Monday after his chemotherapy session, he started having fever. Yesterday, he became more confused per wife at bedside and had a CT scan outpatient at [facility] [place] and instructed to come to the ED for evaluation of potential peritonitis. Currently oriented and denies abdominal pain, diarrhea, cough, dyspnea, dysuria.    In the ED, fever 99.6/  but otherwise vitals stable. Labs with mild hyponatremia and severe leukocytosis. Ascitic fluid consistent with SBP (ANC>250). Received 1L IVF, (15 Jul 2021 02:56)      PAST MEDICAL & SURGICAL HISTORY:  HTN (hypertension)    Prostate cancer    Colorectal cancer    S/P TURP (transurethral resection of prostate)        Allergies    No Known Allergies    Intolerances        ANTIMICROBIALS:  piperacillin/tazobactam IVPB.. 3.375 every 8 hours  vancomycin  IVPB 1000 every 12 hours      OTHER MEDS:  acetaminophen   Tablet .. 650 milliGRAM(s) Oral every 6 hours PRN  aluminum hydroxide/magnesium hydroxide/simethicone Suspension 30 milliLiter(s) Oral every 4 hours PRN  enoxaparin Injectable 60 milliGRAM(s) SubCutaneous two times a day  melatonin 3 milliGRAM(s) Oral at bedtime PRN      SOCIAL HISTORY:  from [place]  no smoking, alcohol or drug abuse  no recent travel    FAMILY HISTORY:  No recent febrile illness in family members        ROS:    All other systems negative     Constitutional: + fever,  chills  Eye: no eye pain, no redness, no vision changes  ENT:  no sore throat, no rhinorrhea  Cardiovascular:  no chest pain, no palpitation  Respiratory:  no SOB, no cough  GI:  + abd pain and distention, no vomiting, no diarrhea  urinary: no dysuria, no hematuria, no flank pain  : no penile discharge or bleeding  musculoskeletal:  no joint pain, no joint swelling  skin:  no rash  neurology:  no headache, no seizure, no change in mental status  psych: no anxiety, no depression     Physical Exam:    General:    NAD, non toxic  Head: atraumatic, normocephalic  Eyes: normal sclera and conjunctiva  ENT:   no oropharyngeal lesions, no LAD, neck supple  Cardio:    regular S1,S2, no murmur  Respiratory:   clear b/l, no wheezing  abd:  +ascites soft, BS +, not tender  :     no CVAT, no suprapubic tenderness, no shetty  Musculoskeletal : no joint swelling, no edema  Skin:    no rash  vascular: no phlebitis, R chest port with no tenderness or erythema  Neurologic:     no focal deficits  psych: normal affect      Drug Dosing Weight  Height (cm): 165.1 (14 Jul 2021 19:51)  Weight (kg): 66.224 (15 Jul 2021 02:40)  BMI (kg/m2): 24.3 (15 Jul 2021 02:40)  BSA (m2): 1.73 (15 Jul 2021 02:40)    Vital Signs Last 24 Hrs  T(F): 98.3 (07-15-21 @ 12:17), Max: 99.6 (07-14-21 @ 19:51)    Vital Signs Last 24 Hrs  HR: 73 (07-15-21 @ 12:17) (73 - 104)  BP: 103/66 (07-15-21 @ 12:17) (103/66 - 111/75)  RR: 18 (07-15-21 @ 12:17)  SpO2: 100% (07-15-21 @ 12:17) (98% - 100%)  Wt(kg): --                          7.5    46.77 )-----------( 230      ( 15 Jul 2021 07:26 )             24.0       07-15    132<L>  |  99  |  14  ----------------------------<  81  3.8   |  19<L>  |  0.80    Ca    8.3<L>      15 Jul 2021 07:26  Mg     2.10     07-15    TPro  5.0<L>  /  Alb  2.0<L>  /  TBili  0.7  /  DBili  x   /  AST  28  /  ALT  8   /  AlkPhos  197<H>  07-15          MICROBIOLOGY:  v  .Body Fluid Peritoneal Fluid  07-15-21 --  --    polymorphonuclear leukocytes seen  No organisms seen  by cytocentrifuge          Rapid RVP Result: NotDetec (07-14 @ 23:27)          RADIOLOGY:    Images independently visualized and reviewed personally,  findings as below  < from: US Duplex Venous Lower Ext Complete, Bilateral (07.15.21 @ 09:32) >  IMPRESSION:  No evidence of deep venous thrombosis in either lower extremity.        < end of copied text >  < from: Xray Chest 1 View- PORTABLE-Urgent (07.14.21 @ 21:34) >    IMPRESSION:  Clear lungs.      < end of copied text >

## 2021-07-16 NOTE — PROGRESS NOTE ADULT - SUBJECTIVE AND OBJECTIVE BOX
Name of Patient : JAELYN CLARKE  MRN: [id number]  DATE OF SERVICE: 07-16-21     Subjective: Patient seen and examined. No new events except as noted.   doing okay  abdominal distention     REVIEW OF SYSTEMS:    CONSTITUTIONAL: No weakness, fevers or chills  EYES/ENT: No visual changes;  No vertigo or throat pain   NECK: No pain or stiffness  RESPIRATORY: No cough, wheezing, hemoptysis; No shortness of breath  CARDIOVASCULAR: No chest pain or palpitations  GASTROINTESTINAL: + abdominal distention   GENITOURINARY: No dysuria, frequency or hematuria  NEUROLOGICAL: No numbness or weakness  SKIN: No itching, burning, rashes, or lesions   All other review of systems is negative unless indicated above.    MEDICATIONS:  MEDICATIONS  (STANDING):  chlorhexidine 2% Cloths 1 Application(s) Topical <User Schedule>  enoxaparin Injectable 60 milliGRAM(s) SubCutaneous two times a day  piperacillin/tazobactam IVPB.. 3.375 Gram(s) IV Intermittent every 8 hours  sodium chloride 0.9%. 1000 milliLiter(s) (50 mL/Hr) IV Continuous <Continuous>      PHYSICAL EXAM:  T(C): 37.7 (07-16-21 @ 19:20), Max: 37.7 (07-16-21 @ 19:20)  HR: 90 (07-16-21 @ 18:55) (75 - 90)  BP: 112/64 (07-16-21 @ 18:55) (101/62 - 116/71)  RR: 17 (07-16-21 @ 18:55) (16 - 18)  SpO2: 100% (07-16-21 @ 18:55) (98% - 100%)  Wt(kg): --  I&O's Summary    16 Jul 2021 07:01  -  16 Jul 2021 21:37  --------------------------------------------------------  IN: 1075 mL / OUT: 200 mL / NET: 875 mL      Height (cm): 165.1 (07-15 @ 22:14)  Weight (kg): 64.5 (07-15 @ 22:14)  BMI (kg/m2): 23.7 (07-15 @ 22:14)  BSA (m2): 1.71 (07-15 @ 22:14)    Appearance: Normal	  HEENT:  PERRLA   Lymphatic: No lymphadenopathy   Cardiovascular: Normal S1 S2, no JVD  Respiratory: normal effort , clear  Gastrointestinal:  distention, pain on palpation   Skin: No rashes,  warm to touch  Psychiatry:  Mood & affect appropriate  Musculuskeletal: No edema      All labs, Imaging and EKGs personally reviewed     07-16-21 @ 07:01  -  07-16-21 @ 21:37  --------------------------------------------------------  IN: 1075 mL / OUT: 200 mL / NET: 875 mL                            8.5    72.64 )-----------( 282      ( 16 Jul 2021 10:08 )             28.1               07-16    134<L>  |  100  |  13  ----------------------------<  127<H>  4.0   |  22  |  0.86    Ca    8.7      16 Jul 2021 10:08  Phos  3.0     07-16  Mg     2.20     07-16    TPro  5.7<L>  /  Alb  2.3<L>  /  TBili  0.9  /  DBili  x   /  AST  37  /  ALT  10  /  AlkPhos  270<H>  07-16    PT/INR - ( 15 Jul 2021 07:26 )   PT: 15.3 sec;   INR: 1.36 ratio         PTT - ( 15 Jul 2021 07:26 )  PTT:33.0 sec

## 2021-07-16 NOTE — CONSULT NOTE ADULT - REASON FOR ADMISSION
Fever and confusion

## 2021-07-16 NOTE — CHART NOTE - NSCHARTNOTEFT_GEN_A_CORE
Ct abdomen and Pelvis reviewed with medical attending Dr. copeland *  Colorectal cancer of the ascending colon with extensive metastatic disease to the liver.  *  Large volume of ascites with nodular thickening along the right parietal peritoneum which may be secondary to peritoneal carcinomatosis vs peritonitis .  cont abx as prescribed   surgery consulted awaiting recs -----------------------  will add cytopthology to ascites fluid if still available in lab

## 2021-07-16 NOTE — PROGRESS NOTE ADULT - ASSESSMENT
# SEPSIS, ALTERED MENTATION  # SPONTANEOUS BACTERIAL PERITONITIS    ·	Presented with sepsis secondary to SBP  ·	Paracentesis consistent with SBP  ·	On zosyn; ID following, appreciate recs  ·	AMS resolve, pt aox3 today   ·	CT abd done pending read         # METASTATIC COLON CANCER  # LIVER METASTASES  # RECURRENT ASCITES  # DVT 2/2021, ON XARELTO OUTPATIENT    ·	Ascites could be malignant; recommend cytology assessment on para fluid  ·	may need repeat tab   ·	On outpatient chemotherapy, hold while inpatient  ·	Will update MSK    Elevate Wbc   - may be secondary to infection   - also had nuelasta on 7/14   - cont to trend     Anemia   - micro cytic anemia   - possible secondary to chemo   - will obtain iron studies b12 folate ldh   - cont to monitor   - transfuse to keep hg >7   - guiac if continues to trend down       John Vogel MD  Hematology Oncology   O: [phone]  C: [phone]

## 2021-07-16 NOTE — PROGRESS NOTE ADULT - ASSESSMENT
66 m with active colorectal cancer (mets to liver first dx'd Dec 2020, started chemo in Feb 2021), DVTs on Xarelto, known ascites with no cirrhosis, last tap 7/7 now p/w fever and AMS after the last chemo (4 days ago), has abd discomfort but no cough, diarrhea, dysuria  here afebrile, WBC: 46  s/p paracentesis, WBC:652, PMN 45%    fever, tachycardia, AMS, leukocytosis to 46 in the setting of metastatic colon ca, ascites, s/p tap which is s/o peritonitis  CT showed   Colorectal cancer of the ascending colon with extensive metastatic disease to the liver.   Large volume of ascites with nodular thickening along the right parietal peritoneum which may be secondary to peritoneal carcinomatosis vs peritonitis     * wbc worsening to 72 but pt clinically well today, ?reactive to metastatic ca and multifactorial   * f/u the final blood and peritoneal cx  * c/w zosyn  * if diarrhea check c-diff  * monitor the WBC/diff and temp curve    The above assessment and plan was discussed with the primary team    Grace Perez MD  Pager [phone]  After 5pm and on weekends call [phone]

## 2021-07-17 LAB
ALBUMIN SERPL ELPH-MCNC: 2.3 G/DL — LOW (ref 3.3–5)
ALBUMIN SERPL ELPH-MCNC: 2.3 G/DL — LOW (ref 3.3–5)
ALP SERPL-CCNC: 223 U/L — HIGH (ref 40–120)
ALP SERPL-CCNC: 246 U/L — HIGH (ref 40–120)
ALT FLD-CCNC: 7 U/L — SIGNIFICANT CHANGE UP (ref 4–41)
ALT FLD-CCNC: 8 U/L — SIGNIFICANT CHANGE UP (ref 4–41)
ANION GAP SERPL CALC-SCNC: 11 MMOL/L — SIGNIFICANT CHANGE UP (ref 7–14)
ANION GAP SERPL CALC-SCNC: 13 MMOL/L — SIGNIFICANT CHANGE UP (ref 7–14)
AST SERPL-CCNC: 24 U/L — SIGNIFICANT CHANGE UP (ref 4–40)
AST SERPL-CCNC: 27 U/L — SIGNIFICANT CHANGE UP (ref 4–40)
BASE EXCESS BLDV CALC-SCNC: 0.4 MMOL/L — SIGNIFICANT CHANGE UP (ref -3–2)
BASOPHILS # BLD AUTO: 0.03 K/UL — SIGNIFICANT CHANGE UP (ref 0–0.2)
BASOPHILS # BLD AUTO: 0.19 K/UL — SIGNIFICANT CHANGE UP (ref 0–0.2)
BASOPHILS NFR BLD AUTO: 0 % — SIGNIFICANT CHANGE UP (ref 0–2)
BASOPHILS NFR BLD AUTO: 0.3 % — SIGNIFICANT CHANGE UP (ref 0–2)
BILIRUB SERPL-MCNC: 0.7 MG/DL — SIGNIFICANT CHANGE UP (ref 0.2–1.2)
BILIRUB SERPL-MCNC: 0.8 MG/DL — SIGNIFICANT CHANGE UP (ref 0.2–1.2)
BLD GP AB SCN SERPL QL: NEGATIVE — SIGNIFICANT CHANGE UP
BLOOD GAS VENOUS - CREATININE: SIGNIFICANT CHANGE UP MG/DL (ref 0.5–1.3)
BLOOD GAS VENOUS COMPREHENSIVE RESULT: SIGNIFICANT CHANGE UP
BUN SERPL-MCNC: 12 MG/DL — SIGNIFICANT CHANGE UP (ref 7–23)
BUN SERPL-MCNC: 12 MG/DL — SIGNIFICANT CHANGE UP (ref 7–23)
CALCIUM SERPL-MCNC: 8.1 MG/DL — LOW (ref 8.4–10.5)
CALCIUM SERPL-MCNC: 8.2 MG/DL — LOW (ref 8.4–10.5)
CHLORIDE BLDV-SCNC: 106 MMOL/L — SIGNIFICANT CHANGE UP (ref 96–108)
CHLORIDE SERPL-SCNC: 102 MMOL/L — SIGNIFICANT CHANGE UP (ref 98–107)
CHLORIDE SERPL-SCNC: 103 MMOL/L — SIGNIFICANT CHANGE UP (ref 98–107)
CO2 SERPL-SCNC: 21 MMOL/L — LOW (ref 22–31)
CO2 SERPL-SCNC: 22 MMOL/L — SIGNIFICANT CHANGE UP (ref 22–31)
CREAT SERPL-MCNC: 0.89 MG/DL — SIGNIFICANT CHANGE UP (ref 0.5–1.3)
CREAT SERPL-MCNC: 0.94 MG/DL — SIGNIFICANT CHANGE UP (ref 0.5–1.3)
EOSINOPHIL # BLD AUTO: 0.04 K/UL — SIGNIFICANT CHANGE UP (ref 0–0.5)
EOSINOPHIL # BLD AUTO: 0.04 K/UL — SIGNIFICANT CHANGE UP (ref 0–0.5)
EOSINOPHIL NFR BLD AUTO: 0.1 % — SIGNIFICANT CHANGE UP (ref 0–6)
EOSINOPHIL NFR BLD AUTO: 0.1 % — SIGNIFICANT CHANGE UP (ref 0–6)
GAS PNL BLDV: 135 MMOL/L — LOW (ref 136–146)
GLUCOSE BLDV-MCNC: 82 MG/DL — SIGNIFICANT CHANGE UP (ref 70–99)
GLUCOSE SERPL-MCNC: 100 MG/DL — HIGH (ref 70–99)
GLUCOSE SERPL-MCNC: 83 MG/DL — SIGNIFICANT CHANGE UP (ref 70–99)
HCO3 BLDV-SCNC: 24 MMOL/L — SIGNIFICANT CHANGE UP (ref 20–27)
HCT VFR BLD CALC: 21.8 % — LOW (ref 39–50)
HCT VFR BLD CALC: 24.3 % — LOW (ref 39–50)
HCT VFR BLDA CALC: 21.3 % — LOW (ref 39–51)
HGB BLD CALC-MCNC: 6.8 G/DL — CRITICAL LOW (ref 13–17)
HGB BLD-MCNC: 6.9 G/DL — CRITICAL LOW (ref 13–17)
HGB BLD-MCNC: 7.5 G/DL — LOW (ref 13–17)
IANC: 54.15 K/UL — HIGH (ref 1.5–8.5)
IANC: 56.26 K/UL — HIGH (ref 1.5–8.5)
IMM GRANULOCYTES NFR BLD AUTO: 6.1 % — HIGH (ref 0–1.5)
IMM GRANULOCYTES NFR BLD AUTO: 6.7 % — HIGH (ref 0–1.5)
LACTATE BLDV-MCNC: 2.7 MMOL/L — HIGH (ref 0.5–2)
LYMPHOCYTES # BLD AUTO: 0.86 K/UL — LOW (ref 1–3.3)
LYMPHOCYTES # BLD AUTO: 0.99 K/UL — LOW (ref 1–3.3)
LYMPHOCYTES # BLD AUTO: 1.4 % — LOW (ref 13–44)
LYMPHOCYTES # BLD AUTO: 1.7 % — LOW (ref 13–44)
MAGNESIUM SERPL-MCNC: 2.3 MG/DL — SIGNIFICANT CHANGE UP (ref 1.6–2.6)
MAGNESIUM SERPL-MCNC: 2.3 MG/DL — SIGNIFICANT CHANGE UP (ref 1.6–2.6)
MCHC RBC-ENTMCNC: 22.3 PG — LOW (ref 27–34)
MCHC RBC-ENTMCNC: 22.8 PG — LOW (ref 27–34)
MCHC RBC-ENTMCNC: 30.9 GM/DL — LOW (ref 32–36)
MCHC RBC-ENTMCNC: 31.7 GM/DL — LOW (ref 32–36)
MCV RBC AUTO: 71.9 FL — LOW (ref 80–100)
MCV RBC AUTO: 72.1 FL — LOW (ref 80–100)
MONOCYTES # BLD AUTO: 0.53 K/UL — SIGNIFICANT CHANGE UP (ref 0–0.9)
MONOCYTES # BLD AUTO: 0.61 K/UL — SIGNIFICANT CHANGE UP (ref 0–0.9)
MONOCYTES NFR BLD AUTO: 0.9 % — LOW (ref 2–14)
MONOCYTES NFR BLD AUTO: 1 % — LOW (ref 2–14)
NEUTROPHILS # BLD AUTO: 54.15 K/UL — HIGH (ref 1.8–7.4)
NEUTROPHILS # BLD AUTO: 56.26 K/UL — HIGH (ref 1.8–7.4)
NEUTROPHILS NFR BLD AUTO: 90.3 % — HIGH (ref 43–77)
NEUTROPHILS NFR BLD AUTO: 91.4 % — HIGH (ref 43–77)
NRBC # BLD: 0 /100 WBCS — SIGNIFICANT CHANGE UP
NRBC # BLD: 0 /100 WBCS — SIGNIFICANT CHANGE UP
NRBC # FLD: 0 K/UL — SIGNIFICANT CHANGE UP
NRBC # FLD: 0.02 K/UL — HIGH
OB PNL STL: POSITIVE
PCO2 BLDV: 50 MMHG — SIGNIFICANT CHANGE UP (ref 41–51)
PH BLDV: 7.33 — SIGNIFICANT CHANGE UP (ref 7.32–7.43)
PHOSPHATE SERPL-MCNC: 2.7 MG/DL — SIGNIFICANT CHANGE UP (ref 2.5–4.5)
PHOSPHATE SERPL-MCNC: 2.8 MG/DL — SIGNIFICANT CHANGE UP (ref 2.5–4.5)
PLATELET # BLD AUTO: 193 K/UL — SIGNIFICANT CHANGE UP (ref 150–400)
PLATELET # BLD AUTO: 194 K/UL — SIGNIFICANT CHANGE UP (ref 150–400)
PO2 BLDV: 31 MMHG — LOW (ref 35–40)
POTASSIUM BLDV-SCNC: 3.3 MMOL/L — LOW (ref 3.4–4.5)
POTASSIUM SERPL-MCNC: 3.4 MMOL/L — LOW (ref 3.5–5.3)
POTASSIUM SERPL-MCNC: 3.6 MMOL/L — SIGNIFICANT CHANGE UP (ref 3.5–5.3)
POTASSIUM SERPL-SCNC: 3.4 MMOL/L — LOW (ref 3.5–5.3)
POTASSIUM SERPL-SCNC: 3.6 MMOL/L — SIGNIFICANT CHANGE UP (ref 3.5–5.3)
PROT SERPL-MCNC: 5 G/DL — LOW (ref 6–8.3)
PROT SERPL-MCNC: 5 G/DL — LOW (ref 6–8.3)
RBC # BLD: 3.03 M/UL — LOW (ref 4.2–5.8)
RBC # BLD: 3.37 M/UL — LOW (ref 4.2–5.8)
RBC # FLD: 25 % — HIGH (ref 10.3–14.5)
RBC # FLD: 25.1 % — HIGH (ref 10.3–14.5)
RH IG SCN BLD-IMP: POSITIVE — SIGNIFICANT CHANGE UP
SAO2 % BLDV: 43.4 % — LOW (ref 60–85)
SODIUM SERPL-SCNC: 136 MMOL/L — SIGNIFICANT CHANGE UP (ref 135–145)
SODIUM SERPL-SCNC: 136 MMOL/L — SIGNIFICANT CHANGE UP (ref 135–145)
VIT B12 SERPL-MCNC: 2000 PG/ML — HIGH (ref 200–900)
WBC # BLD: 59.92 K/UL — CRITICAL HIGH (ref 3.8–10.5)
WBC # BLD: 61.58 K/UL — CRITICAL HIGH (ref 3.8–10.5)
WBC # FLD AUTO: 59.92 K/UL — CRITICAL HIGH (ref 3.8–10.5)
WBC # FLD AUTO: 61.58 K/UL — CRITICAL HIGH (ref 3.8–10.5)

## 2021-07-17 PROCEDURE — 99232 SBSQ HOSP IP/OBS MODERATE 35: CPT | Mod: GC

## 2021-07-17 RX ADMIN — PIPERACILLIN AND TAZOBACTAM 25 GRAM(S): 4; .5 INJECTION, POWDER, LYOPHILIZED, FOR SOLUTION INTRAVENOUS at 12:49

## 2021-07-17 RX ADMIN — CHLORHEXIDINE GLUCONATE 1 APPLICATION(S): 213 SOLUTION TOPICAL at 12:50

## 2021-07-17 RX ADMIN — PIPERACILLIN AND TAZOBACTAM 25 GRAM(S): 4; .5 INJECTION, POWDER, LYOPHILIZED, FOR SOLUTION INTRAVENOUS at 05:31

## 2021-07-17 RX ADMIN — Medication 3 MILLIGRAM(S): at 22:23

## 2021-07-17 RX ADMIN — ENOXAPARIN SODIUM 60 MILLIGRAM(S): 100 INJECTION SUBCUTANEOUS at 18:40

## 2021-07-17 RX ADMIN — ENOXAPARIN SODIUM 60 MILLIGRAM(S): 100 INJECTION SUBCUTANEOUS at 05:31

## 2021-07-17 RX ADMIN — Medication 650 MILLIGRAM(S): at 00:20

## 2021-07-17 RX ADMIN — PIPERACILLIN AND TAZOBACTAM 25 GRAM(S): 4; .5 INJECTION, POWDER, LYOPHILIZED, FOR SOLUTION INTRAVENOUS at 22:23

## 2021-07-17 NOTE — PROGRESS NOTE ADULT - SUBJECTIVE AND OBJECTIVE BOX
Patient is a 66y old  Male who presents with a chief complaint of Fever and confusion (16 Jul 2021 20:35)      MEDICATIONS  (STANDING):  chlorhexidine 2% Cloths 1 Application(s) Topical <User Schedule>  enoxaparin Injectable 60 milliGRAM(s) SubCutaneous two times a day  piperacillin/tazobactam IVPB.. 3.375 Gram(s) IV Intermittent every 8 hours  sodium chloride 0.9%. 1000 milliLiter(s) (50 mL/Hr) IV Continuous <Continuous>    MEDICATIONS  (PRN):  acetaminophen   Tablet .. 650 milliGRAM(s) Oral every 6 hours PRN Temp greater or equal to 38.5C (101.3F), Mild Pain (1 - 3)  aluminum hydroxide/magnesium hydroxide/simethicone Suspension 30 milliLiter(s) Oral every 4 hours PRN Dyspepsia  melatonin 3 milliGRAM(s) Oral at bedtime PRN Insomnia      Interim History:  No acute events over night. Vitals stable. Patient in no acute distress      Vital Signs Last 24 Hrs  T(C): 36.4 (17 Jul 2021 05:30), Max: 37.7 (16 Jul 2021 19:20)  T(F): 97.6 (17 Jul 2021 05:30), Max: 99.9 (16 Jul 2021 19:20)  HR: 60 (17 Jul 2021 05:30) (60 - 90)  BP: 107/72 (17 Jul 2021 05:30) (101/62 - 116/67)  BP(mean): --  RR: 18 (17 Jul 2021 05:30) (16 - 18)  SpO2: 99% (17 Jul 2021 05:30) (98% - 100%)    PE  NAD  Awake, alert  Anicteric, MMM  RRR  CTAB  Ascites, distended  No c/c/e  No rash grossly                            7.5    61.58 )-----------( 193      ( 17 Jul 2021 07:57 )             24.3       07-17    136  |  102  |  12  ----------------------------<  100<H>  3.6   |  21<L>  |  0.89    Ca    8.1<L>      17 Jul 2021 07:57  Phos  2.7     07-17  Mg     2.30     07-17    TPro  5.0<L>  /  Alb  2.3<L>  /  TBili  0.8  /  DBili  x   /  AST  24  /  ALT  7   /  AlkPhos  223<H>  07-17

## 2021-07-17 NOTE — PROGRESS NOTE ADULT - ATTENDING COMMENTS
66M, prostate cancer s/p TURP, colon ca dx 12/2020, chemo since 2/2021, last time 5d prior to admission, extensive liver mets, ascites s/p 2 LVPs at [place], DVT on rivaroxaban, admitted with fever and AMS.  Course: afebrile, severe leukocytosis, dx para in ED showed peritonitis.    - sepsis due to peritonitis. PMN count was > 250 /mcL  - severe leukocytosis, WBC improved today after it worsened yesterday, 62 < 73, on Zosyn 7/15-  - ascites cx: 1 of 2 negative, 1 pending  - BCx 7/15 ngt  - ascites, modeate; nodular liver surface - assume due to the multiple liver metastases seen on CT  - mild biliary ductal dilatation  - nodularity R parietal peritoneum - may be inflammatory or malignant.  - normocytic anemia Hb 7.5 < 8.6. FOBT positive.    -- continue zosyn, f/u cultures. If WBC does not improve further, repeat diagnostic paracentesis  -- monitor Hb  -- if BP, Na, creat remain stable, can start diuretics. 66M, prostate cancer s/p TURP, colon ca dx 12/2020, chemo since 2/2021, last time 5d prior to admission, extensive liver mets, ascites s/p 2 LVPs at [facility], DVT on rivaroxaban, admitted with fever and AMS.  Course: afebrile, severe leukocytosis, dx para in ED showed peritonitis.    - sepsis due to peritonitis. PMN count was > 250 /mcL  - severe leukocytosis, WBC improved today after it worsened yesterday, 62 < 73, on Zosyn 7/15-  - ascites cx: 1 of 2 negative, 1 pending  - BCx 7/15 ngt  - ascites, modeate; nodular liver surface - assume due to the multiple liver metastases seen on CT  - confusion resolved  - mild biliary ductal dilatation  - nodularity R parietal peritoneum - may be inflammatory or malignant.  - normocytic anemia Hb 7.5 < 8.6. FOBT positive.    -- continue zosyn, f/u cultures. If WBC does not improve further, repeat diagnostic paracentesis  -- monitor Hb  -- if BP, Na, creat remain stable, can start diuretics.

## 2021-07-17 NOTE — PROGRESS NOTE ADULT - SUBJECTIVE AND OBJECTIVE BOX
Name of Patient : JAELYN CLARKE  MRN: [id number]  DATE OF SERVICE: 07-17-21 @ 16:29    Subjective: Patient seen and examined. No new events except as noted.   afebrile       REVIEW OF SYSTEMS:    CONSTITUTIONAL: No weakness, fevers or chills  EYES/ENT: No visual changes;  No vertigo or throat pain   NECK: No pain or stiffness  RESPIRATORY: No cough, wheezing, hemoptysis; No shortness of breath  CARDIOVASCULAR: No chest pain or palpitations  GASTROINTESTINAL: abdominal distention   GENITOURINARY: No dysuria, frequency or hematuria  NEUROLOGICAL: No numbness or weakness  SKIN: No itching, burning, rashes, or lesions   All other review of systems is negative unless indicated above.    MEDICATIONS:  MEDICATIONS  (STANDING):  chlorhexidine 2% Cloths 1 Application(s) Topical <User Schedule>  enoxaparin Injectable 60 milliGRAM(s) SubCutaneous two times a day  piperacillin/tazobactam IVPB.. 3.375 Gram(s) IV Intermittent every 8 hours  sodium chloride 0.9%. 1000 milliLiter(s) (50 mL/Hr) IV Continuous <Continuous>      PHYSICAL EXAM:  T(C): 36.6 (07-17-21 @ 14:39), Max: 37.7 (07-16-21 @ 19:20)  HR: 69 (07-17-21 @ 14:39) (60 - 90)  BP: 116/77 (07-17-21 @ 14:39) (105/68 - 116/77)  RR: 16 (07-17-21 @ 14:39) (16 - 18)  SpO2: 99% (07-17-21 @ 14:39) (99% - 100%)  Wt(kg): --  I&O's Summary    16 Jul 2021 07:01  -  17 Jul 2021 07:00  --------------------------------------------------------  IN: 1075 mL / OUT: 200 mL / NET: 875 mL    17 Jul 2021 07:01  -  17 Jul 2021 16:29  --------------------------------------------------------  IN: 300 mL / OUT: 250 mL / NET: 50 mL          Appearance: Normal	  HEENT:  PERRLA   Lymphatic: No lymphadenopathy   Cardiovascular: Normal S1 S2, no JVD  Respiratory: normal effort , clear  Gastrointestinal:  Soft, distended  Skin: No rashes,  warm to touch  Psychiatry:  Mood & affect appropriate  Musculuskeletal: No edema      All labs, Imaging and EKGs personally reviewed         07-16-21 @ 07:01  -  07-17-21 @ 07:00  --------------------------------------------------------  IN: 1075 mL / OUT: 200 mL / NET: 875 mL    07-17-21 @ 07:01  -  07-17-21 @ 16:29  --------------------------------------------------------  IN: 300 mL / OUT: 250 mL / NET: 50 mL                          7.5    61.58 )-----------( 193      ( 17 Jul 2021 07:57 )             24.3               07-17    136  |  102  |  12  ----------------------------<  100<H>  3.6   |  21<L>  |  0.89    Ca    8.1<L>      17 Jul 2021 07:57  Phos  2.7     07-17  Mg     2.30     07-17    TPro  5.0<L>  /  Alb  2.3<L>  /  TBili  0.8  /  DBili  x   /  AST  24  /  ALT  7   /  AlkPhos  223<H>  07-17

## 2021-07-17 NOTE — PROGRESS NOTE ADULT - ASSESSMENT
# SEPSIS, ALTERED MENTATION  # SPONTANEOUS BACTERIAL PERITONITIS    ·	Presented with sepsis secondary to SBP  ·	Paracentesis consistent with SBP  ·	On zosyn; ID following, appreciate recs  ·	AMS resolve, pt aox3 today   ·	CT abd done pending read         # METASTATIC COLON CANCER  # LIVER METASTASES  # RECURRENT ASCITES  # DVT 2/2021, ON XARELTO OUTPATIENT    ·	Ascites could be malignant; recommend cytology assessment on para fluid  ·	may need repeat tap; IR on board, plan for Monday  ·	On outpatient chemotherapy, hold while inpatient  ·	Will update MSK      # LEUKOCYTOSIS  # ANEMIA  - may be secondary to infection   - also had nuelasta on 7/14   - microcytic anemia likely secondary to chemo   - B12, folate normal; iron studies with ACID  - cont to monitor   - transfuse to keep hg >7   - guiac if continues to trend down         Homero Araujo MD  Hematology/Oncology Consultant  [facility]  Cell: [phone]

## 2021-07-17 NOTE — PROGRESS NOTE ADULT - ASSESSMENT
66M active colorectal cancer (mets to liver first dx'd Dec 2020, started chemo in Feb 2021) and DVTs on Xarelto presented to the ED for fever x5d and altered mental status. He was first dx'd in Dec 2021 and started on chemo Feb 2021 and last chemo on Monday. He has known ascitic fluid without known cirrhosis but known to have extensive metastasis to the liver. No other h/o liver disease. Hepatology consulted for management/workup of ascites. He had 3 prior [place]acent[place] in the past (prior to this admission[place] [facility] [place] SAAG > 1.1, ascitic . Concern this could be malignant ascites and intraabdominal infection due to possible necrotic liver mets. Ascitic fluid cx 7/15 with no bacterial growth + no organisms on gram stain.    Impression:  #colon cancer with liver mets- [place] [facility] [place]12  #elevated alk phos- likely 2/2 infiltrative disease from liver mets  #ascitic fluid with elevated PMNs (652) c/f peritonitis, SAAG > 1.1 c/w portal hypertension. Given extent of liver infiltration this is most likely just related to tumor burden than alternative etiology of portal hypertension.    Recommendations:  - F/u MRI Abd w/w/o contrast to evaluate for necrosis of liver mets  - Abx per ID  - Recommend repeat paracentesis w/ cell count to ensure adequate treatment of intra-abd infection; if still elevated PMNs may need alternative abx  - F/u cytology from paracentesis  - Nutrition consult given cachexia/sarcopenia    Shlomo Cardoso  Gastroenterology/Hepatology Fellow  Available via [employer] Teams    NON-URGENT CONSULTS:  Please email [email or web address]  [email or web address]  AT NIGHT AND ON WEEKENDS:  Contact on-call GI fellow via answering service ([phone]) from 5pm-8am and on weekends/holidays  MONDAY-FRIDAY 8AM-5PM:  Pager# [phone] ([facility]) or [phone] ([facility])  GI Phone# [phone] ([facility])

## 2021-07-17 NOTE — PROGRESS NOTE ADULT - SUBJECTIVE AND OBJECTIVE BOX
Chief Complaint:  Patient is a 66y old  Male who presents with a chief complaint of Fever and confusion (16 Jul 2021 20:35)      Reason for consult: fever, peritonitis    Interval Events: AFebrile, WBC improving but still very elevated, enzymes stable    Hospital Medications:  acetaminophen   Tablet .. 650 milliGRAM(s) Oral every 6 hours PRN  aluminum hydroxide/magnesium hydroxide/simethicone Suspension 30 milliLiter(s) Oral every 4 hours PRN  chlorhexidine 2% Cloths 1 Application(s) Topical <User Schedule>  enoxaparin Injectable 60 milliGRAM(s) SubCutaneous two times a day  melatonin 3 milliGRAM(s) Oral at bedtime PRN  piperacillin/tazobactam IVPB.. 3.375 Gram(s) IV Intermittent every 8 hours  sodium chloride 0.9%. 1000 milliLiter(s) IV Continuous <Continuous>      ROS:   General:  No  fevers, chills, night sweats, fatigue  Eyes:  Good vision, no reported pain  ENT:  No sore throat, pain, runny nose  CV:  No pain, palpitations  Pulm:  No dyspnea, cough  GI:  See HPI, otherwise negative  :  No  incontinence, nocturia  Muscle:  No pain, weakness  Neuro:  No memory problems  Psych:  No insomnia, mood problems, depression  Endocrine:  No polyuria, polydipsia, cold/heat intolerance  Heme:  No petechiae, ecchymosis, easy bruisability  Skin:  No rash    PHYSICAL EXAM:   Vital Signs:  Vital Signs Last 24 Hrs  T(C): 36.4 (17 Jul 2021 05:30), Max: 37.7 (16 Jul 2021 19:20)  T(F): 97.6 (17 Jul 2021 05:30), Max: 99.9 (16 Jul 2021 19:20)  HR: 60 (17 Jul 2021 05:30) (60 - 90)  BP: 107/72 (17 Jul 2021 05:30) (101/62 - 116/67)  BP(mean): --  RR: 18 (17 Jul 2021 05:30) (16 - 18)  SpO2: 99% (17 Jul 2021 05:30) (98% - 100%)  Daily     Daily     GENERAL: no acute distress, cachexia, chronically ill appearing  NEURO: alert  HEENT: anicteric sclera, no conjunctival pallor appreciated  CHEST: no respiratory distress, no accessory muscle use  CARDIAC: regular rate, rhythm  ABDOMEN: soft, non-tender, +distended, no rebound or guarding  EXTREMITIES: warm, well perfused, no edema  SKIN: no lesions noted    LABS: reviewed                        7.5    61.58 )-----------( 193      ( 17 Jul 2021 07:57 )             24.3     07-17    136  |  102  |  12  ----------------------------<  100<H>  3.6   |  21<L>  |  0.89    Ca    8.1<L>      17 Jul 2021 07:57  Phos  2.7     07-17  Mg     2.30     07-17    TPro  5.0<L>  /  Alb  2.3<L>  /  TBili  0.8  /  DBili  x   /  AST  24  /  ALT  7   /  AlkPhos  223<H>  07-17    LIVER FUNCTIONS - ( 17 Jul 2021 07:57 )  Alb: 2.3 g/dL / Pro: 5.0 g/dL / ALK PHOS: 223 U/L / ALT: 7 U/L / AST: 24 U/L / GGT: x             Interval Diagnostic Studies: see sunrise for full report

## 2021-07-18 LAB
ALBUMIN SERPL ELPH-MCNC: 2.5 G/DL — LOW (ref 3.3–5)
ALP SERPL-CCNC: 229 U/L — HIGH (ref 40–120)
ALT FLD-CCNC: 7 U/L — SIGNIFICANT CHANGE UP (ref 4–41)
ANION GAP SERPL CALC-SCNC: 13 MMOL/L — SIGNIFICANT CHANGE UP (ref 7–14)
AST SERPL-CCNC: 23 U/L — SIGNIFICANT CHANGE UP (ref 4–40)
BASOPHILS # BLD AUTO: 0.11 K/UL — SIGNIFICANT CHANGE UP (ref 0–0.2)
BASOPHILS NFR BLD AUTO: 0.4 % — SIGNIFICANT CHANGE UP (ref 0–2)
BILIRUB SERPL-MCNC: 0.7 MG/DL — SIGNIFICANT CHANGE UP (ref 0.2–1.2)
BUN SERPL-MCNC: 11 MG/DL — SIGNIFICANT CHANGE UP (ref 7–23)
CALCIUM SERPL-MCNC: 8.1 MG/DL — LOW (ref 8.4–10.5)
CHLORIDE SERPL-SCNC: 101 MMOL/L — SIGNIFICANT CHANGE UP (ref 98–107)
CO2 SERPL-SCNC: 21 MMOL/L — LOW (ref 22–31)
CREAT SERPL-MCNC: 0.82 MG/DL — SIGNIFICANT CHANGE UP (ref 0.5–1.3)
EOSINOPHIL # BLD AUTO: 0.03 K/UL — SIGNIFICANT CHANGE UP (ref 0–0.5)
EOSINOPHIL NFR BLD AUTO: 0.1 % — SIGNIFICANT CHANGE UP (ref 0–6)
GLUCOSE SERPL-MCNC: 98 MG/DL — SIGNIFICANT CHANGE UP (ref 70–99)
HAV IGM SER-ACNC: SIGNIFICANT CHANGE UP
HBV SURFACE AG SER-ACNC: SIGNIFICANT CHANGE UP
HCT VFR BLD CALC: 24.2 % — LOW (ref 39–50)
HCV AB S/CO SERPL IA: 0.09 S/CO — SIGNIFICANT CHANGE UP (ref 0–0.99)
HCV AB SERPL-IMP: SIGNIFICANT CHANGE UP
HGB BLD-MCNC: 7.3 G/DL — LOW (ref 13–17)
IANC: 26.21 K/UL — HIGH (ref 1.5–8.5)
IMM GRANULOCYTES NFR BLD AUTO: 2.6 % — HIGH (ref 0–1.5)
LACTATE SERPL-SCNC: 1.4 MMOL/L — SIGNIFICANT CHANGE UP (ref 0.5–2)
LYMPHOCYTES # BLD AUTO: 0.77 K/UL — LOW (ref 1–3.3)
LYMPHOCYTES # BLD AUTO: 2.7 % — LOW (ref 13–44)
MAGNESIUM SERPL-MCNC: 2.3 MG/DL — SIGNIFICANT CHANGE UP (ref 1.6–2.6)
MCHC RBC-ENTMCNC: 21.9 PG — LOW (ref 27–34)
MCHC RBC-ENTMCNC: 30.2 GM/DL — LOW (ref 32–36)
MCV RBC AUTO: 72.5 FL — LOW (ref 80–100)
MONOCYTES # BLD AUTO: 1.15 K/UL — HIGH (ref 0–0.9)
MONOCYTES NFR BLD AUTO: 4 % — SIGNIFICANT CHANGE UP (ref 2–14)
NEUTROPHILS # BLD AUTO: 26.21 K/UL — HIGH (ref 1.8–7.4)
NEUTROPHILS NFR BLD AUTO: 90.2 % — HIGH (ref 43–77)
NRBC # BLD: 0 /100 WBCS — SIGNIFICANT CHANGE UP
NRBC # FLD: 0.02 K/UL — HIGH
PHOSPHATE SERPL-MCNC: 2.4 MG/DL — LOW (ref 2.5–4.5)
PLATELET # BLD AUTO: 166 K/UL — SIGNIFICANT CHANGE UP (ref 150–400)
POTASSIUM SERPL-MCNC: 3.6 MMOL/L — SIGNIFICANT CHANGE UP (ref 3.5–5.3)
POTASSIUM SERPL-SCNC: 3.6 MMOL/L — SIGNIFICANT CHANGE UP (ref 3.5–5.3)
PROT SERPL-MCNC: 5.1 G/DL — LOW (ref 6–8.3)
RBC # BLD: 3.34 M/UL — LOW (ref 4.2–5.8)
RBC # FLD: 25.2 % — HIGH (ref 10.3–14.5)
SODIUM SERPL-SCNC: 135 MMOL/L — SIGNIFICANT CHANGE UP (ref 135–145)
WBC # BLD: 29.01 K/UL — HIGH (ref 3.8–10.5)
WBC # FLD AUTO: 29.01 K/UL — HIGH (ref 3.8–10.5)

## 2021-07-18 PROCEDURE — 99232 SBSQ HOSP IP/OBS MODERATE 35: CPT | Mod: GC

## 2021-07-18 RX ORDER — SPIRONOLACTONE 25 MG/1
100 TABLET, FILM COATED ORAL DAILY
Refills: 0 | Status: DISCONTINUED | OUTPATIENT
Start: 2021-07-18 | End: 2021-07-23

## 2021-07-18 RX ORDER — POTASSIUM PHOSPHATE, MONOBASIC POTASSIUM PHOSPHATE, DIBASIC 236; 224 MG/ML; MG/ML
15 INJECTION, SOLUTION INTRAVENOUS ONCE
Refills: 0 | Status: COMPLETED | OUTPATIENT
Start: 2021-07-18 | End: 2021-07-18

## 2021-07-18 RX ORDER — FUROSEMIDE 40 MG
40 TABLET ORAL DAILY
Refills: 0 | Status: DISCONTINUED | OUTPATIENT
Start: 2021-07-18 | End: 2021-07-23

## 2021-07-18 RX ADMIN — PIPERACILLIN AND TAZOBACTAM 25 GRAM(S): 4; .5 INJECTION, POWDER, LYOPHILIZED, FOR SOLUTION INTRAVENOUS at 05:34

## 2021-07-18 RX ADMIN — Medication 40 MILLIGRAM(S): at 19:21

## 2021-07-18 RX ADMIN — ENOXAPARIN SODIUM 60 MILLIGRAM(S): 100 INJECTION SUBCUTANEOUS at 05:31

## 2021-07-18 RX ADMIN — CHLORHEXIDINE GLUCONATE 1 APPLICATION(S): 213 SOLUTION TOPICAL at 09:40

## 2021-07-18 RX ADMIN — PIPERACILLIN AND TAZOBACTAM 25 GRAM(S): 4; .5 INJECTION, POWDER, LYOPHILIZED, FOR SOLUTION INTRAVENOUS at 21:41

## 2021-07-18 RX ADMIN — SPIRONOLACTONE 100 MILLIGRAM(S): 25 TABLET, FILM COATED ORAL at 19:21

## 2021-07-18 RX ADMIN — PIPERACILLIN AND TAZOBACTAM 25 GRAM(S): 4; .5 INJECTION, POWDER, LYOPHILIZED, FOR SOLUTION INTRAVENOUS at 12:01

## 2021-07-18 RX ADMIN — POTASSIUM PHOSPHATE, MONOBASIC POTASSIUM PHOSPHATE, DIBASIC 62.5 MILLIMOLE(S): 236; 224 INJECTION, SOLUTION INTRAVENOUS at 12:01

## 2021-07-18 NOTE — PROGRESS NOTE ADULT - ATTENDING COMMENTS
66M, prostate cancer s/p TURP, colon ca dx 12/2020, chemo since 2/2021, last time 5d prior to admission, extensive liver mets, ascites s/p 2 LVPs at [facility] [place], DVT on rivaroxaban, admitted with fever and AMS.  Course: afebrile, severe leukocytosis, dx para in ED showed peritonitis with PMN > 250/mcL    - resolving sepsis due to peritonitis. 1/2 Cx pending, the other one negative.  - severe leukocytosis, WBC improved dramatically today to 29 from 62, on Zosyn 7/15-  - ascites: near-tense, non-tender; no leg edema; creat 0.8 mg/dL and Na 135 are normal  - BCx 7/15 ngt  - nodular liver surface likely due to the multiple liver metastases seen on CT  - confusion resolved  - mild biliary ductal dilatation  - nodularity R parietal peritoneum - may be inflammatory or malignant.  - normocytic anemia Hb 7.5 stable - high ferritin indicates component of anemia of inflammation. FOBT positive.    -- continue zosyn, f/u cultures  -- f/u cytology of ascites  -- ascites: start diuretics lasix 40 mg/d, spironolactone 100 mg/d      - repeat diagnostic paracentesis not required as clinically improving. Can perform therapeutic paracentesis, but she should respond well to diuretics  -- monitor Hb

## 2021-07-18 NOTE — PROGRESS NOTE ADULT - SUBJECTIVE AND OBJECTIVE BOX
Name of Patient : JAELYN CLARKE  MRN: [id number]  DATE OF SERVICE: 07-18-21     Subjective: Patient seen and examined. No new events except as noted.   doing better     REVIEW OF SYSTEMS:    CONSTITUTIONAL: No weakness, fevers or chills  EYES/ENT: No visual changes;  No vertigo or throat pain   NECK: No pain or stiffness  RESPIRATORY: No cough, wheezing, hemoptysis; No shortness of breath  CARDIOVASCULAR: No chest pain or palpitations  GASTROINTESTINAL: No abdominal or epigastric pain. No nausea, vomiting, or hematemesis; No diarrhea or constipation. No melena or hematochezia.  GENITOURINARY: No dysuria, frequency or hematuria  NEUROLOGICAL: No numbness or weakness  SKIN: No itching, burning, rashes, or lesions   All other review of systems is negative unless indicated above.    MEDICATIONS:  MEDICATIONS  (STANDING):  chlorhexidine 2% Cloths 1 Application(s) Topical <User Schedule>  furosemide    Tablet 40 milliGRAM(s) Oral daily  piperacillin/tazobactam IVPB.. 3.375 Gram(s) IV Intermittent every 8 hours  spironolactone 100 milliGRAM(s) Oral daily      PHYSICAL EXAM:  T(C): 36.9 (07-18-21 @ 21:21), Max: 36.9 (07-18-21 @ 05:30)  HR: 82 (07-18-21 @ 21:21) (69 - 82)  BP: 115/78 (07-18-21 @ 21:21) (105/69 - 115/78)  RR: 18 (07-18-21 @ 21:21) (17 - 18)  SpO2: 100% (07-18-21 @ 21:21) (98% - 100%)  Wt(kg): --  I&O's Summary    17 Jul 2021 07:01  -  18 Jul 2021 07:00  --------------------------------------------------------  IN: 300 mL / OUT: 475 mL / NET: -175 mL    18 Jul 2021 07:01  -  18 Jul 2021 23:56  --------------------------------------------------------  IN: 1125 mL / OUT: 375 mL / NET: 750 mL          Appearance: Normal	  HEENT:  PERRLA   Lymphatic: No lymphadenopathy   Cardiovascular: Normal S1 S2, no JVD  Respiratory: normal effort , clear  Gastrointestinal:  Soft, distedntion   Skin: No rashes,  warm to touch  Psychiatry:  Mood & affect appropriate  Musculuskeletal: No edema      All labs, Imaging and EKGs personally reviewed       07-17-21 @ 07:01  -  07-18-21 @ 07:00  --------------------------------------------------------  IN: 300 mL / OUT: 475 mL / NET: -175 mL    07-18-21 @ 07:01  -  07-18-21 @ 23:56  --------------------------------------------------------  IN: 1125 mL / OUT: 375 mL / NET: 750 mL                          7.3    29.01 )-----------( 166 ( 18 Jul 2021 07:51 )             24.2               07-18    135  |  101  |  11  ----------------------------<  98  3.6   |  21<L>  |  0.82    Ca    8.1<L>      18 Jul 2021 07:51  Phos  2.4     07-18  Mg     2.30     07-18    TPro  5.1<L>  /  Alb  2.5<L>  /  TBili  0.7  /  DBili  x   /  AST  23  /  ALT  7   /  AlkPhos  229<H>  07-18

## 2021-07-18 NOTE — PROGRESS NOTE ADULT - SUBJECTIVE AND OBJECTIVE BOX
Chief Complaint:  Patient is a 66y old  Male who presents with a chief complaint of Fever and confusion (17 Jul 2021 16:29)      Reason for consult: ascites    Interval Events: Pt w/o complaints, feels better, leukocytosis significantly improving.    Hospital Medications:  acetaminophen   Tablet .. 650 milliGRAM(s) Oral every 6 hours PRN  aluminum hydroxide/magnesium hydroxide/simethicone Suspension 30 milliLiter(s) Oral every 4 hours PRN  chlorhexidine 2% Cloths 1 Application(s) Topical <User Schedule>  enoxaparin Injectable 60 milliGRAM(s) SubCutaneous two times a day  melatonin 3 milliGRAM(s) Oral at bedtime PRN  piperacillin/tazobactam IVPB.. 3.375 Gram(s) IV Intermittent every 8 hours  sodium chloride 0.9%. 1000 milliLiter(s) IV Continuous <Continuous>      ROS:   General:  No  fevers, chills, night sweats, fatigue  Eyes:  Good vision, no reported pain  ENT:  No sore throat, pain, runny nose  CV:  No pain, palpitations  Pulm:  No dyspnea, cough  GI:  See HPI, otherwise negative  :  No  incontinence, nocturia  Muscle:  No pain, weakness  Neuro:  No memory problems  Psych:  No insomnia, mood problems, depression  Endocrine:  No polyuria, polydipsia, cold/heat intolerance  Heme:  No petechiae, ecchymosis, easy bruisability  Skin:  No rash    PHYSICAL EXAM:   Vital Signs:  Vital Signs Last 24 Hrs  T(C): 36.7 (18 Jul 2021 09:42), Max: 37.1 (17 Jul 2021 21:57)  T(F): 98 (18 Jul 2021 09:42), Max: 98.7 (17 Jul 2021 21:57)  HR: 71 (18 Jul 2021 09:42) (65 - 76)  BP: 112/77 (18 Jul 2021 09:42) (105/69 - 118/74)  BP(mean): --  RR: 17 (18 Jul 2021 09:42) (16 - 17)  SpO2: 100% (18 Jul 2021 09:42) (99% - 100%)  Daily     Daily     GENERAL: no acute distress  NEURO: alert  HEENT: anicteric sclera, no conjunctival pallor appreciated  CHEST: no respiratory distress, no accessory muscle use  CARDIAC: regular rate, rhythm  ABDOMEN: soft, non-tender, ++++ distended, no rebound or guarding  EXTREMITIES: warm, well perfused, no edema  SKIN: no lesions noted    LABS: reviewed                        7.3    29.01 )-----------( 166      ( 18 Jul 2021 07:51 )             24.2     07-18    135  |  101  |  11  ----------------------------<  98  3.6   |  21<L>  |  0.82    Ca    8.1<L>      18 Jul 2021 07:51  Phos  2.4     07-18  Mg     2.30     07-18    TPro  5.1<L>  /  Alb  2.5<L>  /  TBili  0.7  /  DBili  x   /  AST  23  /  ALT  7   /  AlkPhos  229<H>  07-18    LIVER FUNCTIONS - ( 18 Jul 2021 07:51 )  Alb: 2.5 g/dL / Pro: 5.1 g/dL / ALK PHOS: 229 U/L / ALT: 7 U/L / AST: 23 U/L / GGT: x             Interval Diagnostic Studies: see sunrise for full report

## 2021-07-18 NOTE — PROGRESS NOTE ADULT - ASSESSMENT
# SEPSIS, ALTERED MENTATION  # SPONTANEOUS BACTERIAL PERITONITIS    ·	Presented with sepsis secondary to SBP  ·	Paracentesis consistent with SBP  ·	On zosyn; ID following, appreciate recs  ·	AMS resolve, pt aox3 again today   ·	CT abd report complete -- will need to [place] [facility]        # METASTATIC COLON CANCER  # LIVER METASTASES  # RECURRENT ASCITES  # DVT 2/2021, ON XARELTO OUTPATIENT    ·	Ascites could be malignant; recommend cytology assessment on para fluid--PENDING  ·	may need repeat tap however as of now WBC is improving; IR on board, tentative plan for Monday;    ·	On outpatient chemotherapy, hold while inpatient  ·	Will update [facility]      # LEUKOCYTOSIS  # ANEMIA  - likely be secondary to infection given marked improvement in leukocytosis with abx  - also had nuelasta on 7/14   - microcytic anemia likely secondary to chemo   - B12, folate normal; iron studies with ACID  - cont to monitor   - transfuse to keep hg >7   - guiac if continues to trend down       We will be happy to answer any onc questions on behalf of [facility] and will be in touch with them.    Hector Andre MD  Hematology/Oncology  Cell:  [phone]  Office Phone: [phone]  Office Fax:  [phone] [address] [place] [address]

## 2021-07-18 NOTE — PROGRESS NOTE ADULT - ASSESSMENT
66M active colorectal cancer (mets to liver first dx'd Dec 2020, started chemo in Feb 2021) and DVTs on Xarelto presented to the ED for fever x5d and altered mental status. He was first dx'd in Dec 2021 and started on chemo Feb 2021 and last chemo on Monday. He has known ascitic fluid without known cirrhosis but known to have extensive metastasis to the liver. No other h/o liver disease. Hepatology consulted for management/workup of ascites. He had 3 prior paracentesis in the past (prior to this admission) all [place] [facility] SAAG > 1.1, ascitic . Concern this could be malignant ascites and intraabdominal infection due to possible necrotic liver mets. Ascitic fluid cx 7/15 with no bacterial growth + no organisms on gram stain.    Impression:  #colon cancer with liver mets- [place] [facility] chemo 7/12  #elevated alk phos- likely 2/2 infiltrative disease from liver mets  #ascitic fluid with elevated PMNs (652) c/f peritonitis, SAAG > 1.1 c/w portal hypertension. Given extent of liver infiltration this is most likely just related to tumor burden than alternative etiology of portal hypertension.  #Leukocytosis - very elevated to > 70k, now improving w/ abx    Recommendations:  - F/u MRI Abd w/w/o contrast to evaluate for necrosis of liver mets  - Abx per ID  - Given rapid improvement in WBC, repeat para is not required, but reasonable to pursue therapeutic paracentesis for comfort, in that case would check cell count again.   - Can start diuretics for ascites; lasix 40mg and spironolactone 100mg  - F/u cytology from paracentesis  - F/u nutrition given cachexia/sarcopenia    Shlomo Cardoso  Gastroenterology/Hepatology Fellow  Available via Microsoft Teams    NON-URGENT CONSULTS:  Please email [email or web address]  AT NIGHT AND ON WEEKENDS:  Contact on-call GI fellow via answering service ([phone]) from 5pm-8am and on weekends/holidays  MONDAY-FRIDAY 8AM-5PM:  Pager# [phone] ([facility]) or [phone] ([facility])  GI Phone# [phone] ([facility])

## 2021-07-18 NOTE — PROGRESS NOTE ADULT - SUBJECTIVE AND OBJECTIVE BOX
still w ab distension    acetaminophen   Tablet .. 650 milliGRAM(s) Oral every 6 hours PRN  aluminum hydroxide/magnesium hydroxide/simethicone Suspension 30 milliLiter(s) Oral every 4 hours PRN  chlorhexidine 2% Cloths 1 Application(s) Topical <User Schedule>  enoxaparin Injectable 60 milliGRAM(s) SubCutaneous two times a day  melatonin 3 milliGRAM(s) Oral at bedtime PRN  piperacillin/tazobactam IVPB.. 3.375 Gram(s) IV Intermittent every 8 hours  sodium chloride 0.9%. 1000 milliLiter(s) IV Continuous <Continuous>      No Known Allergies      ROS otherwise negative     T(C): 36.7 (07-18-21 @ 09:42), Max: 37.1 (07-17-21 @ 21:57)  HR: 71 (07-18-21 @ 09:42) (65 - 76)  BP: 112/77 (07-18-21 @ 09:42) (105/69 - 118/74)  RR: 17 (07-18-21 @ 09:42) (16 - 17)  SpO2: 100% (07-18-21 @ 09:42) (99% - 100%)  PHYSICAL EXAM  Gen:  laying in bed, nad  H:  anicteric, eomi  Ab +distension, no tenderness  Ext:  no edema                          7.3    29.01 )-----------( 166      ( 18 Jul 2021 07:51 )             24.2                         7.5    61.58 )-----------( 193      ( 17 Jul 2021 07:57 )             24.3                         6.9    59.92 )-----------( 194      ( 17 Jul 2021 06:27 )             21.8   07-18    135  |  101  |  11  ----------------------------<  98  3.6   |  21<L>  |  0.82    Ca    8.1<L>      18 Jul 2021 07:51  Phos  2.4     07-18  Mg     2.30     07-18    TPro  5.1<L>  /  Alb  2.5<L>  /  TBili  0.7  /  DBili  x   /  AST  23  /  ALT  7   /  AlkPhos  229<H>  07-18    Culture Results:   No growth to date. (07.15.21 @ 02:50)    < from: CT Abdomen and Pelvis w/ IV Cont (07.15.21 @ 17:48) >  IMPRESSION:  *  Colorectal cancer of the ascending colon with extensive metastatic disease to the liver.  *  Large volume of ascites with nodular thickening along the right parietal peritoneum which may be secondary to peritoneal carcinomatosis vs peritonitis .    < end of copied text >

## 2021-07-19 LAB
ALBUMIN SERPL ELPH-MCNC: 2.3 G/DL — LOW (ref 3.3–5)
ALP SERPL-CCNC: 241 U/L — HIGH (ref 40–120)
ALT FLD-CCNC: 7 U/L — SIGNIFICANT CHANGE UP (ref 4–41)
ANION GAP SERPL CALC-SCNC: 12 MMOL/L — SIGNIFICANT CHANGE UP (ref 7–14)
AST SERPL-CCNC: 21 U/L — SIGNIFICANT CHANGE UP (ref 4–40)
BASOPHILS # BLD AUTO: 0.08 K/UL — SIGNIFICANT CHANGE UP (ref 0–0.2)
BASOPHILS NFR BLD AUTO: 0.5 % — SIGNIFICANT CHANGE UP (ref 0–2)
BILIRUB SERPL-MCNC: 0.6 MG/DL — SIGNIFICANT CHANGE UP (ref 0.2–1.2)
BUN SERPL-MCNC: 11 MG/DL — SIGNIFICANT CHANGE UP (ref 7–23)
CALCIUM SERPL-MCNC: 8.4 MG/DL — SIGNIFICANT CHANGE UP (ref 8.4–10.5)
CHLORIDE SERPL-SCNC: 102 MMOL/L — SIGNIFICANT CHANGE UP (ref 98–107)
CMV IGM FLD-ACNC: <8 AU/ML — SIGNIFICANT CHANGE UP
CMV IGM SERPL QL: NEGATIVE — SIGNIFICANT CHANGE UP
CO2 SERPL-SCNC: 22 MMOL/L — SIGNIFICANT CHANGE UP (ref 22–31)
CREAT SERPL-MCNC: 0.92 MG/DL — SIGNIFICANT CHANGE UP (ref 0.5–1.3)
EBV EA AB SER IA-ACNC: <5 U/ML — SIGNIFICANT CHANGE UP
EBV EA AB TITR SER IF: POSITIVE
EBV EA IGG SER-ACNC: NEGATIVE — SIGNIFICANT CHANGE UP
EBV NA IGG SER IA-ACNC: 57.7 U/ML — HIGH
EBV PATRN SPEC IB-IMP: SIGNIFICANT CHANGE UP
EBV VCA IGG AVIDITY SER QL IA: POSITIVE
EBV VCA IGM SER IA-ACNC: 200 U/ML — HIGH
EBV VCA IGM SER IA-ACNC: <10 U/ML — SIGNIFICANT CHANGE UP
EBV VCA IGM TITR FLD: NEGATIVE — SIGNIFICANT CHANGE UP
EOSINOPHIL # BLD AUTO: 0.03 K/UL — SIGNIFICANT CHANGE UP (ref 0–0.5)
EOSINOPHIL NFR BLD AUTO: 0.2 % — SIGNIFICANT CHANGE UP (ref 0–6)
GLUCOSE SERPL-MCNC: 98 MG/DL — SIGNIFICANT CHANGE UP (ref 70–99)
HCT VFR BLD CALC: 23.7 % — LOW (ref 39–50)
HGB BLD-MCNC: 7.3 G/DL — LOW (ref 13–17)
IANC: 14.04 K/UL — HIGH (ref 1.5–8.5)
IMM GRANULOCYTES NFR BLD AUTO: 3 % — HIGH (ref 0–1.5)
INR BLD: 1.29 RATIO — HIGH (ref 0.88–1.16)
LACTATE SERPL-SCNC: 1.7 MMOL/L — SIGNIFICANT CHANGE UP (ref 0.5–2)
LYMPHOCYTES # BLD AUTO: 0.68 K/UL — LOW (ref 1–3.3)
LYMPHOCYTES # BLD AUTO: 4 % — LOW (ref 13–44)
MAGNESIUM SERPL-MCNC: 2 MG/DL — SIGNIFICANT CHANGE UP (ref 1.6–2.6)
MCHC RBC-ENTMCNC: 22.1 PG — LOW (ref 27–34)
MCHC RBC-ENTMCNC: 30.8 GM/DL — LOW (ref 32–36)
MCV RBC AUTO: 71.6 FL — LOW (ref 80–100)
MONOCYTES # BLD AUTO: 1.85 K/UL — HIGH (ref 0–0.9)
MONOCYTES NFR BLD AUTO: 10.8 % — SIGNIFICANT CHANGE UP (ref 2–14)
NEUTROPHILS # BLD AUTO: 14.04 K/UL — HIGH (ref 1.8–7.4)
NEUTROPHILS NFR BLD AUTO: 81.5 % — HIGH (ref 43–77)
NRBC # BLD: 0 /100 WBCS — SIGNIFICANT CHANGE UP
NRBC # FLD: 0 K/UL — SIGNIFICANT CHANGE UP
PHOSPHATE SERPL-MCNC: 2.4 MG/DL — LOW (ref 2.5–4.5)
PLATELET # BLD AUTO: 178 K/UL — SIGNIFICANT CHANGE UP (ref 150–400)
POTASSIUM SERPL-MCNC: 3.8 MMOL/L — SIGNIFICANT CHANGE UP (ref 3.5–5.3)
POTASSIUM SERPL-SCNC: 3.8 MMOL/L — SIGNIFICANT CHANGE UP (ref 3.5–5.3)
PROT SERPL-MCNC: 5.4 G/DL — LOW (ref 6–8.3)
PROTHROM AB SERPL-ACNC: 14.5 SEC — HIGH (ref 10.6–13.6)
RBC # BLD: 3.31 M/UL — LOW (ref 4.2–5.8)
RBC # FLD: 25.4 % — HIGH (ref 10.3–14.5)
SODIUM SERPL-SCNC: 136 MMOL/L — SIGNIFICANT CHANGE UP (ref 135–145)
WBC # BLD: 17.2 K/UL — HIGH (ref 3.8–10.5)
WBC # FLD AUTO: 17.2 K/UL — HIGH (ref 3.8–10.5)

## 2021-07-19 PROCEDURE — 93975 VASCULAR STUDY: CPT | Mod: 26

## 2021-07-19 PROCEDURE — 99232 SBSQ HOSP IP/OBS MODERATE 35: CPT | Mod: GC

## 2021-07-19 PROCEDURE — 99232 SBSQ HOSP IP/OBS MODERATE 35: CPT

## 2021-07-19 RX ORDER — LIDOCAINE HCL 20 MG/ML
10 VIAL (ML) INJECTION ONCE
Refills: 0 | Status: DISCONTINUED | OUTPATIENT
Start: 2021-07-19 | End: 2021-07-23

## 2021-07-19 RX ADMIN — PIPERACILLIN AND TAZOBACTAM 25 GRAM(S): 4; .5 INJECTION, POWDER, LYOPHILIZED, FOR SOLUTION INTRAVENOUS at 13:24

## 2021-07-19 RX ADMIN — PIPERACILLIN AND TAZOBACTAM 25 GRAM(S): 4; .5 INJECTION, POWDER, LYOPHILIZED, FOR SOLUTION INTRAVENOUS at 21:12

## 2021-07-19 RX ADMIN — PIPERACILLIN AND TAZOBACTAM 25 GRAM(S): 4; .5 INJECTION, POWDER, LYOPHILIZED, FOR SOLUTION INTRAVENOUS at 05:30

## 2021-07-19 NOTE — DIETITIAN INITIAL EVALUATION ADULT. - CHIEF COMPLAINT
65 y/o Male with medical history of active colorectal cancer (mets to liver first Dx. Dec 2020, started chemo Feb 2021) presented to ED for fever and AMS, now with ascitic fluid consistent with SBP. Admitted for sepsis 2/2 SBP, +ascites s/p paracentesis obtained 600mL fluid.

## 2021-07-19 NOTE — PROGRESS NOTE ADULT - SUBJECTIVE AND OBJECTIVE BOX
Follow Up:  fever    Interval History: pt afebrile and WBC now 17, plan for repeat paracentesis    ROS:      All other systems negative    Constitutional: no fever, no chills  Cardiovascular:  no chest pain, no palpitation  Respiratory:  no SOB, no cough  GI:  + abd discomfort and distention, no vomiting, no diarrhea  urinary: no dysuria, no hematuria, no flank pain  musculoskeletal:  no joint pain, no joint swelling  skin:  no rash  neurology:  no headache, no seizure        Allergies  No Known Allergies        ANTIMICROBIALS:  piperacillin/tazobactam IVPB.. 3.375 every 8 hours      OTHER MEDS:  acetaminophen   Tablet .. 650 milliGRAM(s) Oral every 6 hours PRN  aluminum hydroxide/magnesium hydroxide/simethicone Suspension 30 milliLiter(s) Oral every 4 hours PRN  chlorhexidine 2% Cloths 1 Application(s) Topical <User Schedule>  furosemide    Tablet 40 milliGRAM(s) Oral daily  lidocaine 1% Injectable 10 milliLiter(s) Local Injection once  melatonin 3 milliGRAM(s) Oral at bedtime PRN  spironolactone 100 milliGRAM(s) Oral daily      Vital Signs Last 24 Hrs  T(C): 36.5 (19 Jul 2021 13:09), Max: 36.9 (18 Jul 2021 21:21)  T(F): 97.7 (19 Jul 2021 13:09), Max: 98.4 (18 Jul 2021 21:21)  HR: 72 (19 Jul 2021 13:09) (72 - 86)  BP: 140/96 (19 Jul 2021 13:09) (109/65 - 140/96)  BP(mean): --  RR: 18 (19 Jul 2021 13:09) (18 - 18)  SpO2: 100% (19 Jul 2021 13:09) (100% - 100%)    Physical Exam:  General:    NAD,  non toxic  Cardio:     regular S1, S2,  no murmur  Respiratory:    clear b/l,    no wheezing  abd:   distended with ascites but  soft,   BS +,   no tenderness  :   no CVAT,  no suprapubic tenderness,   no  shetty  Musculoskeletal:   no joint swelling  vascular: no phlebitis, R chest port  Skin:    no rash                        7.3    17.20 )-----------( 178      ( 19 Jul 2021 06:51 )             23.7       07-19    136  |  102  |  11  ----------------------------<  98  3.8   |  22  |  0.92    Ca    8.4      19 Jul 2021 06:51  Phos  2.4     07-19  Mg     2.00     07-19    TPro  5.4<L>  /  Alb  2.3<L>  /  TBili  0.6  /  DBili  x   /  AST  21  /  ALT  7   /  AlkPhos  241<H>  07-19          MICROBIOLOGY:  v  .Blood Port Device  07-15-21   No growth to date.  --  --      .Blood Blood-Peripheral  07-15-21   No growth to date.  --  --      .Body Fluid Peritoneal Fluid  07-15-21   No growth  --    polymorphonuclear leukocytes seen  No organisms seen  by cytocentrifuge          Rapid RVP Result: NotDetec (07-14 @ 23:27)        RADIOLOGY:  Images independently visualized and reviewed personally, findings as below  < from: US Abdomen Doppler (07.19.21 @ 10:20) >  IMPRESSION:  No evidence of portal venous thrombosis, as clinically questioned.  Hepatic metastatic disease.  Cholelithiasis.      < end of copied text >  < from: CT Abdomen and Pelvis w/ IV Cont (07.15.21 @ 17:48) >    IMPRESSION:  *  Colorectal cancer of the ascending colon with extensive metastatic disease to the liver.  *  Large volume of ascites with nodular thickening along the right parietal peritoneum which may be secondary to peritoneal carcinomatosis vs peritonitis .    < end of copied text >

## 2021-07-19 NOTE — PROGRESS NOTE ADULT - ASSESSMENT
66M active colorectal cancer (mets to liver first dx'd Dec 2020, started chemo in Feb 2021) and DVTs on Xarelto presented to the ED for fever x5d and altered mental status. He was first dx'd in Dec 2021 and started on chemo Feb 2021 and last chemo on Monday. He has known ascitic fluid without known cirrhosis but known to have extensive metastasis to the liver. No other h/o liver disease. Hepatology consulted for management/workup of ascites. He had 3 prior paracentesis in the past (prior to this admission) all done at [facility]. SAAG > 1.1, ascitic . Concern this could be malignant ascites and intraabdominal infection due to possible necrotic liver mets. Ascitic fluid cx 7/15 with no bacterial growth + no organisms on gram stain.    #colon cancer with extensive liver mets- [place] at [facility], last chemo 7/12  #elevated alk phos- likely 2/2 infiltrative disease from liver mets    #Peritonitis; ascitic fluid with elevated PMNs (652) c/f peritonitis, SAAG > 1.1 c/w portal hypertension most likely from extent of liver infiltration + tumor burden rather than cirrhosis. Liver duplex negative for PV thrombosis. NO previous imaging on file to assess for cirrhosis, plan to call oncologist Dr. Virk for previous imaging ([phone]). Gold standard would be liver biopsy, will hold off for now.     #Leukocytosis - very elevated to > 70k, now improving w/ abx    Recommendations:  - Abx per ID  - Patient reporting mild discomfort and distension due to ascites, would pursue therapeutic paracentesis in the next 1-2 days.    - Okay to continue lasix 40mg and spironolactone 100mg BUT would hold off if Systolic < 100.  - Please obtain PARTH, IgG, anti smooth muscle, and anti-mitochondrial antibody to complete work up    Hima Lang MD  Gastroenterology/Hepatology Fellow  Available via [employer] Teams  Pager: [phone]    NON-URGENT CONSULTS:  Please email [email or web address]  [email or web address]  AT NIGHT AND ON WEEKENDS:  Contact on-call GI fellow via answering service ([phone]) from 5pm-8am and on weekends/holidays         66M active colorectal cancer (mets to liver first dx'd Dec 2020, started chemo in Feb 2021) and DVTs on Xarelto presented to the ED for fever x5d and altered mental status. He was first dx'd in Dec 2021 and started on chemo Feb 2021 and last chemo on Monday. He has known ascitic fluid without known cirrhosis but known to have extensive metastasis to the liver. No other h/o liver disease. Hepatology consulted for management/workup of ascites. He had 3 prior paracentesis in the past (prior to this admission) [place] [facility] [place] SAAG > 1.1, ascitic . Concern this could be malignant ascites and intraabdominal infection due to possible necrotic liver mets. Ascitic fluid cx 7/15 with no bacterial growth + no organisms on gram stain.    #colon cancer with extensive liver mets- [place] [facility] [place], last chemo 7/12  #elevated alk phos- likely 2/2 infiltrative disease from liver mets    #Peritonitis; ascitic fluid with elevated PMNs (652) c/f peritonitis, SAAG > 1.1 c/w portal hypertension most likely from extent of liver infiltration + tumor burden rather than cirrhosis. Liver duplex negative for PV thrombosis. NO previous imaging on file to assess for cirrhosis, plan to call oncologist Dr. Virk for previous imaging ([phone]).   #Leukocytosis - very elevated to > 70k, now improving w/ abx    Recommendations:  - Abx per ID  - Patient reporting mild discomfort and distension due to ascites, would pursue therapeutic paracentesis in the next 1-2 days.    - Okay to continue lasix 40mg and spironolactone 100mg BUT would hold off if Systolic < 100.  - Please obtain PARTH, IgG, anti smooth muscle, and anti-mitochondrial antibody to complete work up    Hima Lang MD  Gastroenterology/Hepatology Fellow  Available via [employer] Teams  Pager: [phone]    NON-URGENT CONSULTS:  Please email [email or web address]  AT NIGHT AND ON WEEKENDS:  Contact on-call GI fellow via answering service ([phone]) from 5pm-8am and on weekends/holidays

## 2021-07-19 NOTE — DIETITIAN INITIAL EVALUATION ADULT. - SIGNS/SYMPTOMS
<75% nutritional needs >1month, weight loss>13% over < 6months, +ascites, physical findings metastatic cancer

## 2021-07-19 NOTE — PROGRESS NOTE ADULT - SUBJECTIVE AND OBJECTIVE BOX
Name of Patient : JAELYN CLARKE  MRN: [id number]  DATE OF SERVICE: 07-19-21     Subjective: Patient seen and examined. No new events except as noted.   doing okay  stable hgb     REVIEW OF SYSTEMS:    CONSTITUTIONAL: No weakness, fevers or chills  EYES/ENT: No visual changes;  No vertigo or throat pain   NECK: No pain or stiffness  RESPIRATORY: No cough, wheezing, hemoptysis; No shortness of breath  CARDIOVASCULAR: No chest pain or palpitations  GASTROINTESTINAL: No abdominal or epigastric pain. No nausea, vomiting, or hematemesis; No diarrhea or constipation. No melena or hematochezia.  GENITOURINARY: No dysuria, frequency or hematuria  NEUROLOGICAL: No numbness or weakness  SKIN: No itching, burning, rashes, or lesions   All other review of systems is negative unless indicated above.    MEDICATIONS:  MEDICATIONS  (STANDING):  chlorhexidine 2% Cloths 1 Application(s) Topical <User Schedule>  furosemide    Tablet 40 milliGRAM(s) Oral daily  lidocaine 1% Injectable 10 milliLiter(s) Local Injection once  piperacillin/tazobactam IVPB.. 3.375 Gram(s) IV Intermittent every 8 hours  spironolactone 100 milliGRAM(s) Oral daily      PHYSICAL EXAM:  T(C): 36.5 (07-19-21 @ 13:09), Max: 36.9 (07-18-21 @ 21:21)  HR: 72 (07-19-21 @ 13:09) (72 - 86)  BP: 140/96 (07-19-21 @ 13:09) (109/65 - 140/96)  RR: 18 (07-19-21 @ 13:09) (18 - 18)  SpO2: 100% (07-19-21 @ 13:09) (100% - 100%)  Wt(kg): --  I&O's Summary    18 Jul 2021 07:01  -  19 Jul 2021 07:00  --------------------------------------------------------  IN: 1225 mL / OUT: 2375 mL / NET: -1150 mL    19 Jul 2021 07:01  -  19 Jul 2021 20:54  --------------------------------------------------------  IN: 150 mL / OUT: 300 mL / NET: -150 mL          Appearance: Normal	  HEENT:  PERRLA   Lymphatic: No lymphadenopathy   Cardiovascular: Normal S1 S2, no JVD  Respiratory: normal effort , clear  Gastrointestinal:  Soft, Non-tender  Skin: No rashes,  warm to touch  Psychiatry:  Mood & affect appropriate  Musculuskeletal: No edema      All labs, Imaging and EKGs personally reviewed       07-18-21 @ 07:01  -  07-19-21 @ 07:00  --------------------------------------------------------  IN: 1225 mL / OUT: 2375 mL / NET: -1150 mL    07-19-21 @ 07:01  -  07-19-21 @ 20:54  --------------------------------------------------------  IN: 150 mL / OUT: 300 mL / NET: -150 mL                              7.3    17.20 )-----------( 178      ( 19 Jul 2021 06:51 )             23.7               07-19    136  |  102  |  11  ----------------------------<  98  3.8   |  22  |  0.92    Ca    8.4      19 Jul 2021 06:51  Phos  2.4     07-19  Mg     2.00     07-19    TPro  5.4<L>  /  Alb  2.3<L>  /  TBili  0.6  /  DBili  x   /  AST  21  /  ALT  7   /  AlkPhos  241<H>  07-19    PT/INR - ( 19 Jul 2021 08:19 )   PT: 14.5 sec;   INR: 1.29 ratio

## 2021-07-19 NOTE — PROGRESS NOTE ADULT - ASSESSMENT
# SEPSIS, ALTERED MENTATION  # SPONTANEOUS BACTERIAL PERITONITIS    ·	Presented with sepsis secondary to SBP  ·	Paracentesis consistent with SBP  ·	On zosyn; ID following, appreciate recs  ·	AMS resolve, pt aox3   ·	CT abd report complete -- [place] [facility]        # METASTATIC COLON CANCER  # LIVER METASTASES  # RECURRENT ASCITES  # DVT 2/2021, ON XARELTO OUTPATIENT    ·	Ascites could be malignant; recommend cytology assessment on para fluid--PENDING  ·	planned for IR tap today   ·	On outpatient chemotherapy, hold while inpatient  ·	[place] [facility]      # LEUKOCYTOSIS  # ANEMIA  - likely be secondary to infection given marked improvement in leukocytosis with abx  - also had nuelasta on 7/14   - microcytic anemia likely secondary to chemo   - B12, folate normal; iron studies with ACID  - cont to monitor   - transfuse to keep hg >7   - guiac +   - consider GI consult       We will be happy to answer any onc questions on behalf of [facility] and will be in touch with them.    John Vogel MD  Hematology Oncology   O:   C: [phone]

## 2021-07-19 NOTE — PROGRESS NOTE ADULT - SUBJECTIVE AND OBJECTIVE BOX
Interval Events:   Reports mild abdominal discomfort and distension, no N or V.    Hospital Medications:  acetaminophen   Tablet .. 650 milliGRAM(s) Oral every 6 hours PRN  aluminum hydroxide/magnesium hydroxide/simethicone Suspension 30 milliLiter(s) Oral every 4 hours PRN  chlorhexidine 2% Cloths 1 Application(s) Topical <User Schedule>  furosemide    Tablet 40 milliGRAM(s) Oral daily  lidocaine 1% Injectable 10 milliLiter(s) Local Injection once  melatonin 3 milliGRAM(s) Oral at bedtime PRN  piperacillin/tazobactam IVPB.. 3.375 Gram(s) IV Intermittent every 8 hours  spironolactone 100 milliGRAM(s) Oral daily        ROS:   General:  No fevers, chills or night sweats  ENT:  No sore throat or dysphagia  CV:  No pain or palpitations  Resp:  No dyspnea, cough or  wheezing  GI:  as above  Skin:  No rash or edema  Neuro: + weakness   Hematologic: no bleeding  Musculoskeletal: no muscle pain or join pain  Psych: no agitation      PHYSICAL EXAM:   Vital Signs:  Vital Signs Last 24 Hrs  T(C): 36.5 (19 Jul 2021 13:09), Max: 36.9 (18 Jul 2021 21:21)  T(F): 97.7 (19 Jul 2021 13:09), Max: 98.4 (18 Jul 2021 21:21)  HR: 72 (19 Jul 2021 13:09) (72 - 86)  BP: 140/96 (19 Jul 2021 13:09) (109/65 - 140/96)  BP(mean): --  RR: 18 (19 Jul 2021 13:09) (18 - 18)  SpO2: 100% (19 Jul 2021 13:09) (100% - 100%)  Daily     Daily     GENERAL:  NAD, Appears stated age  HEENT:  NC/AT,  conjunctivae clear and pink, sclera -anicteric  CHEST:  Normal Effort, Breath sounds clear  HEART:  RRR, S1 + S2, no murmurs  ABDOMEN:  Distended abdomen, fluid shift, tender to palpation.  EXTREMITIES:  no cyanosis or edema  SKIN:  Warm & Dry. No rash or erythema  NEURO:  Alert, oriented, no focal deficit    LABS:                        7.3    17.20 )-----------( 178      ( 19 Jul 2021 06:51 )             23.7     Mean Cell Volume: 71.6 fL (07-19-21 @ 06:51)    07-19    136  |  102  |  11  ----------------------------<  98  3.8   |  22  |  0.92    Ca    8.4      19 Jul 2021 06:51  Phos  2.4     07-19  Mg     2.00     07-19    TPro  5.4<L>  /  Alb  2.3<L>  /  TBili  0.6  /  DBili  x   /  AST  21  /  ALT  7   /  AlkPhos  241<H>  07-19    LIVER FUNCTIONS - ( 19 Jul 2021 06:51 )  Alb: 2.3 g/dL / Pro: 5.4 g/dL / ALK PHOS: 241 U/L / ALT: 7 U/L / AST: 21 U/L / GGT: x           PT/INR - ( 19 Jul 2021 08:19 )   PT: 14.5 sec;   INR: 1.29 ratio                                     7.3    17.20 )-----------( 178      ( 19 Jul 2021 06:51 )             23.7                         7.3    29.01 )-----------( 166      ( 18 Jul 2021 07:51 )             24.2                         7.5    61.58 )-----------( 193      ( 17 Jul 2021 07:57 )             24.3                         6.9    59.92 )-----------( 194      ( 17 Jul 2021 06:27 )             21.8       Imaging:    < from: US Abdomen Doppler (07.19.21 @ 10:20) >    IMPRESSION:  No evidence of portal venous thrombosis, as clinically questioned.  Hepatic metastatic disease.  Cholelithiasis.    < end of copied text >

## 2021-07-19 NOTE — DIETITIAN INITIAL EVALUATION ADULT. - ETIOLOGY
increased demand for nutrients patient meets criteria for severe malnutrition in the context of chronic illness

## 2021-07-19 NOTE — PROGRESS NOTE ADULT - ASSESSMENT
66 m with active colorectal cancer (mets to liver first dx'd Dec 2020, started chemo in Feb 2021), DVTs on Xarelto, known ascites with no cirrhosis, last tap 7/7 now p/w fever and AMS after the last chemo (4 days ago), has abd discomfort but no cough, diarrhea, dysuria  here afebrile, WBC: 46  s/p paracentesis, WBC:652, PMN 45%    fever, tachycardia, AMS, leukocytosis to 46 in the setting of metastatic colon ca, ascites, s/p tap which is s/o peritonitis  CT showed   Colorectal cancer of the ascending colon with extensive metastatic disease to the liver.   Large volume of ascites with nodular thickening along the right parietal peritoneum which may be secondary to peritoneal carcinomatosis vs peritonitis     * wbc improving now 17  * the blood and peritoneal cx negative  * c/w zosyn, started 7/15 now day 5  * on discharge switch to Po cipro 500 qd for SBP ppx  * monitor the WBC/diff and temp curve    The above assessment and plan was discussed with the primary team    Grace Perez MD  Pager [phone]  After 5pm and on weekends call [phone]

## 2021-07-19 NOTE — DIETITIAN INITIAL EVALUATION ADULT. - PERTINENT LABORATORY DATA
07-19 Na136 mmol/L Glu 98 mg/dL K+ 3.8 mmol/L Cr  0.92 mg/dL BUN 11 mg/dL 07-19 Phos 2.4 mg/dL<L> 07-19 Alb 2.3 g/dL<L>

## 2021-07-19 NOTE — DIETITIAN INITIAL EVALUATION ADULT. - ORAL INTAKE PTA/DIET HISTORY
Patient reports diminished appetite and PO intake since Dec 2020 and also notes weight loss. +Early satiety and lack of desire to eat reported. Usual weight reported 165lbs and now down to 64.5kg/142.1lbs (7/15/2021). NKFA.

## 2021-07-19 NOTE — PROGRESS NOTE ADULT - ATTENDING COMMENTS
A 66  year old man with prostate cancer s/p TURP, colon cancer dx 12/2020, chemo since 2/2021, last dose 5 day prior to admission, with extensive liver mets, [place] [facility] [place] rivarox[place], presented with fever and AMS found to have sepsis, was seen for ascites and peritonitis.   Patient has distended abdomen and has mild discomfort on diuretics. Awake, alert and oriented x3. Sepsis and confusion resolved on zosyn,   A/P: advanced metastatic colon cancer to the liver, nodular liver surface likely due to the extensive metastatic liver disease  Would recommend-get records from outside hospital, continue antibiotics per ID, diuretics if blood pressure stable, therapeutic paracentesis as needed and ascites for cytology, trend liver tests and INR, avoid hepatotoxic agents and continue rest of care per primary team.

## 2021-07-19 NOTE — DIETITIAN INITIAL EVALUATION ADULT. - OTHER INFO
Met with patient and family at bedside. Patient endorses fair appetite and PO intake % at this time. Patient denies any nausea/vomiting/diarrhea/constipation or difficulty chewing and swallowing. Discussed importance of having well-balanced, nutrient and protein dense foods. Reviewed High Biological Value Protein sources (i.e. chicken, turkey, beef, fish, eggs, etc.). Strategies to increase kcal and protein intake also provided. RD remains available, patient made aware.

## 2021-07-19 NOTE — PROGRESS NOTE ADULT - SUBJECTIVE AND OBJECTIVE BOX
Patient seen and examined at bedside. pt feels well. no active complaints     MEDICATIONS  (STANDING):  chlorhexidine 2% Cloths 1 Application(s) Topical <User Schedule>  furosemide    Tablet 40 milliGRAM(s) Oral daily  piperacillin/tazobactam IVPB.. 3.375 Gram(s) IV Intermittent every 8 hours  spironolactone 100 milliGRAM(s) Oral daily    MEDICATIONS  (PRN):  acetaminophen   Tablet .. 650 milliGRAM(s) Oral every 6 hours PRN Temp greater or equal to 38.5C (101.3F), Mild Pain (1 - 3)  aluminum hydroxide/magnesium hydroxide/simethicone Suspension 30 milliLiter(s) Oral every 4 hours PRN Dyspepsia  melatonin 3 milliGRAM(s) Oral at bedtime PRN Insomnia        Vital Signs Last 24 Hrs  T(C): 36.8 (19 Jul 2021 05:50), Max: 36.9 (18 Jul 2021 16:50)  T(F): 98.2 (19 Jul 2021 05:50), Max: 98.4 (18 Jul 2021 16:50)  HR: 86 (19 Jul 2021 05:50) (69 - 86)  BP: 109/65 (19 Jul 2021 05:50) (107/76 - 115/78)  BP(mean): --  RR: 18 (19 Jul 2021 05:50) (18 - 18)  SpO2: 100% (19 Jul 2021 05:50) (98% - 100%)      PHYSICAL EXAM:     GENERAL:  Appears stated age, well-groomed  HEENT:  NC/AT,   CHEST:  CTA b/l  HEART:  S1 s2+   ABDOMEN:  Soft, + distention   EXTEREMITIES:  no cyanosis,clubbing or edema  SKIN:  No rash/erythema/ecchymoses  LN: No palpable Lymphadenopathy    NEURO:  Alert, oriented, no asterixis                            7.3    17.20 )-----------( 178      ( 19 Jul 2021 06:51 )             23.7       07-19    136  |  102  |  11  ----------------------------<  98  3.8   |  22  |  0.92    Ca    8.4      19 Jul 2021 06:51  Phos  2.4     07-19  Mg     2.00     07-19    TPro  5.4<L>  /  Alb  2.3<L>  /  TBili  0.6  /  DBili  x   /  AST  21  /  ALT  7   /  AlkPhos  241<H>  07-19

## 2021-07-19 NOTE — DIETITIAN INITIAL EVALUATION ADULT. - ADD RECOMMEND
1. Monitor weights, labs, BM's, skin integrity, p.o. intake. 2. Consider Multivitamin with minerals for micronutrient coverage.

## 2021-07-20 LAB
ALBUMIN FLD-MCNC: 1 G/DL — SIGNIFICANT CHANGE UP
ALBUMIN SERPL ELPH-MCNC: 2.5 G/DL — LOW (ref 3.3–5)
ALP SERPL-CCNC: 229 U/L — HIGH (ref 40–120)
ALT FLD-CCNC: 7 U/L — SIGNIFICANT CHANGE UP (ref 4–41)
ANION GAP SERPL CALC-SCNC: 17 MMOL/L — HIGH (ref 7–14)
AST SERPL-CCNC: 18 U/L — SIGNIFICANT CHANGE UP (ref 4–40)
B PERT IGG+IGM PNL SER: ABNORMAL
BASOPHILS # BLD AUTO: 0.07 K/UL — SIGNIFICANT CHANGE UP (ref 0–0.2)
BASOPHILS NFR BLD AUTO: 0.4 % — SIGNIFICANT CHANGE UP (ref 0–2)
BILIRUB SERPL-MCNC: 0.7 MG/DL — SIGNIFICANT CHANGE UP (ref 0.2–1.2)
BUN SERPL-MCNC: 11 MG/DL — SIGNIFICANT CHANGE UP (ref 7–23)
CALCIUM SERPL-MCNC: 8.3 MG/DL — LOW (ref 8.4–10.5)
CHLORIDE SERPL-SCNC: 101 MMOL/L — SIGNIFICANT CHANGE UP (ref 98–107)
CMV DNA CSF QL NAA+PROBE: SIGNIFICANT CHANGE UP
CO2 SERPL-SCNC: 20 MMOL/L — LOW (ref 22–31)
COLOR FLD: YELLOW
CREAT SERPL-MCNC: 0.83 MG/DL — SIGNIFICANT CHANGE UP (ref 0.5–1.3)
CULTURE RESULTS: NO GROWTH — SIGNIFICANT CHANGE UP
CULTURE RESULTS: SIGNIFICANT CHANGE UP
CULTURE RESULTS: SIGNIFICANT CHANGE UP
EOSINOPHIL # BLD AUTO: 0.09 K/UL — SIGNIFICANT CHANGE UP (ref 0–0.5)
EOSINOPHIL # FLD: 0 % — SIGNIFICANT CHANGE UP
EOSINOPHIL NFR BLD AUTO: 0.5 % — SIGNIFICANT CHANGE UP (ref 0–6)
FLUID INTAKE SUBSTANCE CLASS: SIGNIFICANT CHANGE UP
FLUID SEGMENTED GRANULOCYTES: 28 % — SIGNIFICANT CHANGE UP
FOLATE+VIT B12 SERBLD-IMP: 0 % — SIGNIFICANT CHANGE UP
GLUCOSE FLD-MCNC: 108 MG/DL — SIGNIFICANT CHANGE UP
GLUCOSE SERPL-MCNC: 75 MG/DL — SIGNIFICANT CHANGE UP (ref 70–99)
GRAM STN FLD: SIGNIFICANT CHANGE UP
HCT VFR BLD CALC: 23 % — LOW (ref 39–50)
HCT VFR BLD CALC: 23.9 % — LOW (ref 39–50)
HGB BLD-MCNC: 7.1 G/DL — LOW (ref 13–17)
HGB BLD-MCNC: 7.2 G/DL — LOW (ref 13–17)
HSV1 IGG SER-ACNC: 31.2 INDEX — HIGH
HSV1 IGG SERPL QL IA: POSITIVE
HSV2 IGG FLD-ACNC: 0.04 INDEX — SIGNIFICANT CHANGE UP
HSV2 IGG SERPL QL IA: NEGATIVE — SIGNIFICANT CHANGE UP
IANC: 13.31 K/UL — HIGH (ref 1.5–8.5)
IMM GRANULOCYTES NFR BLD AUTO: 2 % — HIGH (ref 0–1.5)
LDH SERPL L TO P-CCNC: 137 U/L — SIGNIFICANT CHANGE UP
LYMPHOCYTES # BLD AUTO: 0.88 K/UL — LOW (ref 1–3.3)
LYMPHOCYTES # BLD AUTO: 5.1 % — LOW (ref 13–44)
LYMPHOCYTES # FLD: 31 % — SIGNIFICANT CHANGE UP
MAGNESIUM SERPL-MCNC: 2.1 MG/DL — SIGNIFICANT CHANGE UP (ref 1.6–2.6)
MCHC RBC-ENTMCNC: 21.7 PG — LOW (ref 27–34)
MCHC RBC-ENTMCNC: 22.3 PG — LOW (ref 27–34)
MCHC RBC-ENTMCNC: 30.1 GM/DL — LOW (ref 32–36)
MCHC RBC-ENTMCNC: 30.9 GM/DL — LOW (ref 32–36)
MCV RBC AUTO: 72 FL — LOW (ref 80–100)
MCV RBC AUTO: 72.1 FL — LOW (ref 80–100)
MESOTHL CELL # FLD: 1 % — SIGNIFICANT CHANGE UP
MONOCYTES # BLD AUTO: 2.61 K/UL — HIGH (ref 0–0.9)
MONOCYTES NFR BLD AUTO: 15.1 % — HIGH (ref 2–14)
MONOS+MACROS # FLD: 40 % — SIGNIFICANT CHANGE UP
NEUTROPHILS # BLD AUTO: 13.31 K/UL — HIGH (ref 1.8–7.4)
NEUTROPHILS NFR BLD AUTO: 76.9 % — SIGNIFICANT CHANGE UP (ref 43–77)
NRBC # BLD: 0 /100 WBCS — SIGNIFICANT CHANGE UP
NRBC # BLD: 0 /100 WBCS — SIGNIFICANT CHANGE UP
NRBC # FLD: 0 K/UL — SIGNIFICANT CHANGE UP
NRBC # FLD: 0.02 K/UL — HIGH
OTHER CELLS FLD MANUAL: 0 % — SIGNIFICANT CHANGE UP
PHOSPHATE SERPL-MCNC: 2.4 MG/DL — LOW (ref 2.5–4.5)
PLATELET # BLD AUTO: 143 K/UL — LOW (ref 150–400)
PLATELET # BLD AUTO: 147 K/UL — LOW (ref 150–400)
POTASSIUM SERPL-MCNC: 3.8 MMOL/L — SIGNIFICANT CHANGE UP (ref 3.5–5.3)
POTASSIUM SERPL-SCNC: 3.8 MMOL/L — SIGNIFICANT CHANGE UP (ref 3.5–5.3)
PROT FLD-MCNC: 2.1 G/DL — SIGNIFICANT CHANGE UP
PROT SERPL-MCNC: 5.1 G/DL — LOW (ref 6–8.3)
RBC # BLD: 3.19 M/UL — LOW (ref 4.2–5.8)
RBC # BLD: 3.32 M/UL — LOW (ref 4.2–5.8)
RBC # FLD: 25.4 % — HIGH (ref 10.3–14.5)
RBC # FLD: 25.5 % — HIGH (ref 10.3–14.5)
RCV VOL RI: 2000 CELLS/UL — HIGH (ref 0–5)
SODIUM SERPL-SCNC: 138 MMOL/L — SIGNIFICANT CHANGE UP (ref 135–145)
SPECIMEN SOURCE: SIGNIFICANT CHANGE UP
TOTAL NUCLEATED CELL COUNT, BODY FLUID: 554 CELLS/UL — HIGH (ref 0–5)
TUBE TYPE: SIGNIFICANT CHANGE UP
WBC # BLD: 17.31 K/UL — HIGH (ref 3.8–10.5)
WBC # BLD: 21.67 K/UL — HIGH (ref 3.8–10.5)
WBC # FLD AUTO: 17.31 K/UL — HIGH (ref 3.8–10.5)
WBC # FLD AUTO: 21.67 K/UL — HIGH (ref 3.8–10.5)

## 2021-07-20 PROCEDURE — 88112 CYTOPATH CELL ENHANCE TECH: CPT | Mod: 26

## 2021-07-20 PROCEDURE — 88305 TISSUE EXAM BY PATHOLOGIST: CPT | Mod: 26

## 2021-07-20 PROCEDURE — 49083 ABD PARACENTESIS W/IMAGING: CPT | Mod: GC

## 2021-07-20 PROCEDURE — 99232 SBSQ HOSP IP/OBS MODERATE 35: CPT | Mod: GC

## 2021-07-20 PROCEDURE — 74183 MRI ABD W/O CNTR FLWD CNTR: CPT | Mod: 26

## 2021-07-20 PROCEDURE — 99232 SBSQ HOSP IP/OBS MODERATE 35: CPT

## 2021-07-20 RX ORDER — SODIUM,POTASSIUM PHOSPHATES 278-250MG
1 POWDER IN PACKET (EA) ORAL
Refills: 0 | Status: COMPLETED | OUTPATIENT
Start: 2021-07-20 | End: 2021-07-21

## 2021-07-20 RX ORDER — PANTOPRAZOLE SODIUM 20 MG/1
40 TABLET, DELAYED RELEASE ORAL
Refills: 0 | Status: DISCONTINUED | OUTPATIENT
Start: 2021-07-20 | End: 2021-07-23

## 2021-07-20 RX ADMIN — Medication 1 PACKET(S): at 09:33

## 2021-07-20 RX ADMIN — PANTOPRAZOLE SODIUM 40 MILLIGRAM(S): 20 TABLET, DELAYED RELEASE ORAL at 20:27

## 2021-07-20 RX ADMIN — Medication 1 PACKET(S): at 17:44

## 2021-07-20 RX ADMIN — PIPERACILLIN AND TAZOBACTAM 25 GRAM(S): 4; .5 INJECTION, POWDER, LYOPHILIZED, FOR SOLUTION INTRAVENOUS at 05:45

## 2021-07-20 RX ADMIN — CHLORHEXIDINE GLUCONATE 1 APPLICATION(S): 213 SOLUTION TOPICAL at 09:34

## 2021-07-20 RX ADMIN — Medication 40 MILLIGRAM(S): at 05:45

## 2021-07-20 RX ADMIN — PIPERACILLIN AND TAZOBACTAM 25 GRAM(S): 4; .5 INJECTION, POWDER, LYOPHILIZED, FOR SOLUTION INTRAVENOUS at 12:02

## 2021-07-20 RX ADMIN — PIPERACILLIN AND TAZOBACTAM 25 GRAM(S): 4; .5 INJECTION, POWDER, LYOPHILIZED, FOR SOLUTION INTRAVENOUS at 22:26

## 2021-07-20 RX ADMIN — SPIRONOLACTONE 100 MILLIGRAM(S): 25 TABLET, FILM COATED ORAL at 05:45

## 2021-07-20 RX ADMIN — Medication 1 PACKET(S): at 11:59

## 2021-07-20 NOTE — PROGRESS NOTE ADULT - ATTENDING COMMENTS
A 66M, prostate cancer s/p TURP, colon cancer dx 12/2020, chemo since 2/2021, last dose 5 day prior to admission, with extensive liver metastasis, ascites s/p 2 times LVPs at [facility] [place], DVT on rivaroxaban, presented with fever and AMS found to have sepsis, was seen for ascites and peritonitis.   Patient with no hepatic encephalopathy, distended abdomen without tenderness or rebound., Marked leukocytosis downtrended and stable at 17. Normal AST, ALT and TB, elevated ALP to <2x.     A/P: advanced metastatic colon cancer to the liver, nodular liver surface likely due to the extensive metastatic liver disease  Would recommend- continue antibiotics per ID, diuretics if blood pressure stable, therapeutic paracentesis as needed and ascitic fluid analysis for cytology, trend liver tests and INR, avoid hepatotoxic agents and continue rest of care per primary team.

## 2021-07-20 NOTE — PROGRESS NOTE ADULT - ASSESSMENT
# SEPSIS, ALTERED MENTATION  # SPONTANEOUS BACTERIAL PERITONITIS    ·	Presented with sepsis secondary to SBP  ·	Paracentesis consistent with SBP  ·	On zosyn; ID following, appreciate recs  ·	AMS resolve, pt aox3   ·	CT abd report complete -- will need to compare [place] [facility] [place]        # METASTATIC COLON CANCER  # LIVER METASTASES  # RECURRENT ASCITES  # DVT 2/2021, ON XARELTO OUTPATIENT    ·	Ascites could be malignant; ascitic fliud non diagnostic   ·	planned for IR tap today , will resend cytology   ·	On outpatient chemotherapy, hold while inpatient  ·	Will update MSK  ·	- hepatology f/u of MRI of abd to r/o necrotic liver mets       # LEUKOCYTOSIS  # ANEMIA  - likely be secondary to infection given marked improvement in leukocytosis with abx  - also had nuelasta on 7/14   - microcytic anemia likely secondary to chemo   - B12, folate normal; iron studies with ACID  - cont to monitor   - transfuse to keep hg >7   - guiac +   - GI consult       We will be happy to answer any onc questions on behalf of [facility] [place] and will be in touch with them.    John Vogel MD  Hematology Oncology   O:   C: [phone]

## 2021-07-20 NOTE — CHART NOTE - NSCHARTNOTEFT_GEN_A_CORE
IR Follow-Up     ultrasound abdomen reviewed. small amount of ascites. therapeutic paracentesis not indicated at this time.     --  Agus Worrell DO/CARLOS  Interventional Radiology Resident (PGY-3)   IR Pager #[phone]

## 2021-07-20 NOTE — PROGRESS NOTE ADULT - ASSESSMENT
66M active colorectal cancer (mets to liver first dx'd Dec 2020, started chemo in Feb 2021) and DVTs on Xarelto presented to the ED for fever x5d and altered mental status. He was first dx'd in Dec 2021 and started on chemo Feb 2021 and last chemo on Monday. He has known ascitic fluid without known cirrhosis but known to have extensive metastasis to the liver. No other h/o liver disease. Hepatology consulted for management/workup of ascites. He had 3 prior paracentesis in the past (prior to this admission) all done at [facility] [place]. SAAG > 1.1, ascitic . Concern this could be malignant ascites and intraabdominal infection due to possible necrotic liver mets. Ascitic fluid cx 7/15 with no bacterial growth + no organisms on gram stain.    #colon cancer with extensive liver mets- follows at [facility] [place], last chemo 7/12  #elevated alk phos- likely 2/2 infiltrative disease from liver mets    #Peritonitis; ascitic fluid with elevated PMNs (652) c/f peritonitis, SAAG > 1.1 c/w portal hypertension most likely from extent of liver infiltration + tumor burden rather than cirrhosis. Liver duplex negative for PV thrombosis. NO previous imaging on file to assess for cirrhosis.  #Leukocytosis - very elevated to > 70k, now improving w/ abx    #Thrombocytopenia: down trending in past 3-4 days.    Recommendations:  - Abx per ID  - Please pursue therapeutic paracentesis in the next 1 days.    - Low Salt diet  - Okay to continue lasix 40mg and spironolactone 100mg BUT would hold off if Systolic < 100.  - Thrombocytopenia work up per primary team.        Hima Lang MD   Gastroenterology Fellow  Pager: [phone]  Please call answering service [phone] / on-call GI fellow after 5pm and before 8am, and on weekends. 66M active colorectal cancer (mets to liver first dx'd Dec 2020, started chemo in Feb 2021) and DVTs on Xarelto presented to the ED for fever x5d and altered mental status. He was first dx'd in Dec 2021 and started on chemo Feb 2021 and last chemo on Monday. He has known ascitic fluid without known cirrhosis but known to have extensive metastasis to the liver. No other h/o liver disease. Hepatology consulted for management/workup of ascites. He had 3 prior paracentesis in the past (prior to this admission) all done at [facility] [place]. SAAG > 1.1, ascitic . Concern this could be malignant ascites and intraabdominal infection due to possible necrotic liver mets. Ascitic fluid cx 7/15 with no bacterial growth + no organisms on gram stain.    #colon cancer with extensive liver mets- follows at [facility] [place], last chemo 7/12  #elevated alk phos- likely 2/2 infiltrative disease from liver mets    #Peritonitis; ascitic fluid with elevated PMNs (652) c/f peritonitis, SAAG > 1.1 c/w portal hypertension most likely from extent of liver infiltration + tumor burden rather than cirrhosis. Liver duplex negative for PV thrombosis. NO previous imaging on file to assess for cirrhosis.  #Leukocytosis - very elevated to > 70k, now improving w/ abx    #Thrombocytopenia: down trending in past 3-4 days.    Recommendations:  - Abx per ID  - Please pursue therapeutic paracentesis (would send cell count with diff and cytology)  - Low Salt diet  - Okay to continue lasix 40mg and spironolactone 100mg BUT would hold off if Systolic < 100.  - Thrombocytopenia work up per primary team.        Hima Lang MD   Gastroenterology Fellow  Pager: [phone]  Please call answering service [phone] / on-call GI fellow after 5pm and before 8am, and on weekends. 66M active colorectal cancer (mets to liver first dx'd Dec 2020, started chemo in Feb 2021) and DVTs on Xarelto presented to the ED for fever x5d and altered mental status. He was first dx'd in Dec 2021 and started on chemo Feb 2021 and last chemo on Monday. He has known ascitic fluid without known cirrhosis but known to have extensive metastasis to the liver. No other h/o liver disease. Hepatology consulted for management/workup of ascites. He had 3 prior paracentesis in the past (prior to this admission) all done at [facility] [place]. SAAG > 1.1, ascitic . Concern this could be malignant ascites and intraabdominal infection due to possible necrotic liver mets. Ascitic fluid cx 7/15 with no bacterial growth + no organisms on gram stain.    #colon cancer with extensive liver mets- follows at [facility] [place], last chemo 7/12  #elevated alk phos- likely 2/2 infiltrative disease from liver mets    #Peritonitis; ascitic fluid with elevated PMNs (652) c/f peritonitis, SAAG > 1.1 c/w portal hypertension most likely from extent of liver infiltration + tumor burden rather than cirrhosis. Liver duplex negative for PV thrombosis. NO previous imaging on file to assess for cirrhosis.  #Leukocytosis - very elevated to > 70k, now improving w/ abx    #Thrombocytopenia: down trending in past 3-4 days.    Recommendations:  - Abx per ID  - Please pursue diagnostic/therapeutic paracentesis (would send cell count with diff and cytology again.  - Low Salt diet  - Okay to continue lasix 40mg and spironolactone 100mg BUT would hold off if Systolic < 100.  - Thrombocytopenia work up per primary team.        Hima Lang MD   Gastroenterology Fellow  Pager: [phone]  Please call answering service [phone] / on-call GI fellow after 5pm and before 8am, and on weekends.

## 2021-07-20 NOTE — PROGRESS NOTE ADULT - SUBJECTIVE AND OBJECTIVE BOX
Follow Up:  fever    Interval History: pt afebrile and WBC still 17, s/p repeat paracentesis today    ROS:      All other systems negative    Constitutional: no fever, no chills  Cardiovascular:  no chest pain, no palpitation  Respiratory:  no SOB, no cough  GI:  + abd discomfort and distention, no vomiting, no diarrhea  urinary: no dysuria, no hematuria, no flank pain  musculoskeletal:  no joint pain, no joint swelling  skin:  no rash  neurology:  no headache, no seizure          Allergies  No Known Allergies        ANTIMICROBIALS:  piperacillin/tazobactam IVPB.. 3.375 every 8 hours      OTHER MEDS:  acetaminophen   Tablet .. 650 milliGRAM(s) Oral every 6 hours PRN  aluminum hydroxide/magnesium hydroxide/simethicone Suspension 30 milliLiter(s) Oral every 4 hours PRN  chlorhexidine 2% Cloths 1 Application(s) Topical <User Schedule>  furosemide    Tablet 40 milliGRAM(s) Oral daily  lidocaine 1% Injectable 10 milliLiter(s) Local Injection once  melatonin 3 milliGRAM(s) Oral at bedtime PRN  potassium phosphate / sodium phosphate Powder (PHOS-NaK) 1 Packet(s) Oral three times a day before meals  spironolactone 100 milliGRAM(s) Oral daily      Vital Signs Last 24 Hrs  T(C): 36.4 (20 Jul 2021 13:41), Max: 37.3 (19 Jul 2021 21:49)  T(F): 97.5 (20 Jul 2021 13:41), Max: 99.1 (19 Jul 2021 21:49)  HR: 86 (20 Jul 2021 13:41) (70 - 86)  BP: 116/80 (20 Jul 2021 13:41) (116/80 - 124/90)  BP(mean): --  RR: 17 (20 Jul 2021 13:41) (17 - 18)  SpO2: 100% (20 Jul 2021 13:41) (100% - 100%)    Physical Exam:  General:    NAD,  non toxic  Cardio:     regular S1, S2,  no murmur  Respiratory:    clear b/l,    no wheezing  abd:   distended with ascites but  soft,   BS +,   no tenderness  :   no CVAT,  no suprapubic tenderness,   no  shetty  Musculoskeletal:   no joint swelling  vascular: no phlebitis, R chest port  Skin:    no rash                          7.2    17.31 )-----------( 143      ( 20 Jul 2021 07:05 )             23.9       07-20    138  |  101  |  11  ----------------------------<  75  3.8   |  20<L>  |  0.83    Ca    8.3<L>      20 Jul 2021 07:05  Phos  2.4     07-20  Mg     2.10     07-20    TPro  5.1<L>  /  Alb  2.5<L>  /  TBili  0.7  /  DBili  x   /  AST  18  /  ALT  7   /  AlkPhos  229<H>  07-20          MICROBIOLOGY:  v  .Body Fluid Peritoneal Fluid  07-15-21   Testing in progress  --  --      .Body Fluid Peritoneal Fluid  07-14-21   No growth  --    polymorphonuclear leukocytes seen  No organisms seen  by cytocentrifuge      .Blood Port Device  07-14-21   No Growth Final  --  --      .Blood Blood-Peripheral  07-14-21   No Growth Final  --  --          Rapid RVP Result: NotDetec (07-14 @ 23:27)        RADIOLOGY:  Images independently visualized and reviewed personally, findings as below  < from: US Abdomen Doppler (07.19.21 @ 10:20) >  IMPRESSION:  No evidence of portal venous thrombosis, as clinically questioned.  Hepatic metastatic disease.  Cholelithiasis.    < end of copied text >  < from: CT Abdomen and Pelvis w/ IV Cont (07.15.21 @ 17:48) >    IMPRESSION:  *  Colorectal cancer of the ascending colon with extensive metastatic disease to the liver.  *  Large volume of ascites with nodular thickening along the right parietal peritoneum which may be secondary to peritoneal carcinomatosis vs peritonitis .    < end of copied text >

## 2021-07-20 NOTE — PROGRESS NOTE ADULT - SUBJECTIVE AND OBJECTIVE BOX
Interval Events:   Reports mild abdominal discomfort and distension, no N or V.     Hospital Medications:  acetaminophen   Tablet .. 650 milliGRAM(s) Oral every 6 hours PRN  aluminum hydroxide/magnesium hydroxide/simethicone Suspension 30 milliLiter(s) Oral every 4 hours PRN  chlorhexidine 2% Cloths 1 Application(s) Topical <User Schedule>  furosemide    Tablet 40 milliGRAM(s) Oral daily  lidocaine 1% Injectable 10 milliLiter(s) Local Injection once  melatonin 3 milliGRAM(s) Oral at bedtime PRN  piperacillin/tazobactam IVPB.. 3.375 Gram(s) IV Intermittent every 8 hours  spironolactone 100 milliGRAM(s) Oral daily        ROS:   General:  No fevers, chills or night sweats  ENT:  No sore throat or dysphagia  CV:  No pain or palpitations  Resp:  No dyspnea, cough or  wheezing  GI:  as above  Skin:  No rash or edema  Neuro: no weakness   Hematologic: no bleeding  Musculoskeletal: no muscle pain or join pain  Psych: no agitation      PHYSICAL EXAM:   Vital Signs:  Vital Signs Last 24 Hrs  T(C): 36.4 (20 Jul 2021 06:10), Max: 37.3 (19 Jul 2021 21:49)  T(F): 97.5 (20 Jul 2021 06:10), Max: 99.1 (19 Jul 2021 21:49)  HR: 70 (20 Jul 2021 06:10) (70 - 73)  BP: 124/90 (20 Jul 2021 06:10) (118/79 - 140/96)  BP(mean): --  RR: 18 (20 Jul 2021 06:10) (18 - 18)  SpO2: 100% (20 Jul 2021 06:10) (100% - 100%)  Daily     Daily     GENERAL:  NAD, Appears stated age  HEENT:  NC/AT,  conjunctivae clear and pink, sclera -anicteric  CHEST:  Normal Effort, Breath sounds clear  HEART:  RRR, S1 + S2, no murmurs  ABDOMEN:  Soft, non-tender, non-distended, normoactive bowel sounds,  no masses  EXTREMITIES:  no cyanosis or edema  SKIN:  Warm & Dry. No rash or erythema  NEURO:  Alert, oriented, no focal deficit    LABS:                        7.2    17.31 )-----------( 143      ( 20 Jul 2021 07:05 )             23.9     Mean Cell Volume: 72.0 fL (07-20-21 @ 07:05)    07-20    138  |  101  |  11  ----------------------------<  75  3.8   |  20<L>  |  0.83    Ca    8.3<L>      20 Jul 2021 07:05  Phos  2.4     07-20  Mg     2.10     07-20    TPro  5.1<L>  /  Alb  2.5<L>  /  TBili  0.7  /  DBili  x   /  AST  18  /  ALT  7   /  AlkPhos  229<H>  07-20    LIVER FUNCTIONS - ( 20 Jul 2021 07:05 )  Alb: 2.5 g/dL / Pro: 5.1 g/dL / ALK PHOS: 229 U/L / ALT: 7 U/L / AST: 18 U/L / GGT: x           PT/INR - ( 19 Jul 2021 08:19 )   PT: 14.5 sec;   INR: 1.29 ratio                                     7.2    17.31 )-----------( 143      ( 20 Jul 2021 07:05 )             23.9                         7.3    17.20 )-----------( 178      ( 19 Jul 2021 06:51 )             23.7                         7.3    29.01 )-----------( 166      ( 18 Jul 2021 07:51 )             24.2       Imaging:  Images reviewed.     Interval Events:   Reports mild abdominal discomfort and distension, no N or V.     Hospital Medications:  acetaminophen   Tablet .. 650 milliGRAM(s) Oral every 6 hours PRN  aluminum hydroxide/magnesium hydroxide/simethicone Suspension 30 milliLiter(s) Oral every 4 hours PRN  chlorhexidine 2% Cloths 1 Application(s) Topical <User Schedule>  furosemide    Tablet 40 milliGRAM(s) Oral daily  lidocaine 1% Injectable 10 milliLiter(s) Local Injection once  melatonin 3 milliGRAM(s) Oral at bedtime PRN  piperacillin/tazobactam IVPB.. 3.375 Gram(s) IV Intermittent every 8 hours  spironolactone 100 milliGRAM(s) Oral daily        ROS:   General:  No fevers, chills or night sweats  ENT:  No sore throat or dysphagia  CV:  No pain or palpitations  Resp:  No dyspnea, cough or  wheezing  GI:  as above  Skin:  No rash or edema  Neuro: no weakness   Hematologic: no bleeding  Musculoskeletal: no muscle pain or join pain  Psych: no agitation      PHYSICAL EXAM:   Vital Signs:  Vital Signs Last 24 Hrs  T(C): 36.4 (20 Jul 2021 06:10), Max: 37.3 (19 Jul 2021 21:49)  T(F): 97.5 (20 Jul 2021 06:10), Max: 99.1 (19 Jul 2021 21:49)  HR: 70 (20 Jul 2021 06:10) (70 - 73)  BP: 124/90 (20 Jul 2021 06:10) (118/79 - 140/96)  BP(mean): --  RR: 18 (20 Jul 2021 06:10) (18 - 18)  SpO2: 100% (20 Jul 2021 06:10) (100% - 100%)  Daily     Daily     GENERAL:  NAD, Appears stated age  HEENT:  NC/AT,  conjunctivae clear and pink, sclera -anicteric  CHEST:  Normal Effort, Breath sounds clear  HEART:  RRR, S1 + S2, no murmurs  ABDOMEN:  Distended abdomen, fluid shift, tender to palpation.  EXTREMITIES:  no cyanosis or edema  SKIN:  Warm & Dry. No rash or erythema  NEURO:  Alert, oriented, no focal deficit    LABS:                        7.2    17.31 )-----------( 143      ( 20 Jul 2021 07:05 )             23.9     Mean Cell Volume: 72.0 fL (07-20-21 @ 07:05)    07-20    138  |  101  |  11  ----------------------------<  75  3.8   |  20<L>  |  0.83    Ca    8.3<L>      20 Jul 2021 07:05  Phos  2.4     07-20  Mg     2.10     07-20    TPro  5.1<L>  /  Alb  2.5<L>  /  TBili  0.7  /  DBili  x   /  AST  18  /  ALT  7   /  AlkPhos  229<H>  07-20    LIVER FUNCTIONS - ( 20 Jul 2021 07:05 )  Alb: 2.5 g/dL / Pro: 5.1 g/dL / ALK PHOS: 229 U/L / ALT: 7 U/L / AST: 18 U/L / GGT: x           PT/INR - ( 19 Jul 2021 08:19 )   PT: 14.5 sec;   INR: 1.29 ratio                                     7.2    17.31 )-----------( 143      ( 20 Jul 2021 07:05 )             23.9                         7.3    17.20 )-----------( 178      ( 19 Jul 2021 06:51 )             23.7                         7.3    29.01 )-----------( 166      ( 18 Jul 2021 07:51 )             24.2       Imaging:  Images reviewed.

## 2021-07-20 NOTE — PROGRESS NOTE ADULT - ASSESSMENT
66 m with active colorectal cancer (mets to liver first dx'd Dec 2020, started chemo in Feb 2021), DVTs on Xarelto, known ascites with no cirrhosis, last tap 7/7 now p/w fever and AMS after the last chemo (4 days ago), has abd discomfort but no cough, diarrhea, dysuria  here afebrile, WBC: 46  s/p paracentesis, WBC:652, PMN 45%    fever, tachycardia, AMS, leukocytosis  in the setting of metastatic colon ca, ascites, s/p tap which is s/o peritonitis  CT showed   Colorectal cancer of the ascending colon with extensive metastatic disease to the liver.   Large volume of ascites with nodular thickening along the right parietal peritoneum which may be secondary to peritoneal carcinomatosis vs peritonitis     * c/w zosyn, started 7/15 now day 6  * repeat tap today showed 557 WBC and 28% PMN so improved from SBP point of view  * on discharge switch to PO cipro 500 qd for SBP ppx  * monitor the WBC/diff and temp curve    The above assessment and plan was discussed with the primary team    Grace Perez MD  Pager [phone]  After 5pm and on weekends call [phone]

## 2021-07-20 NOTE — PROGRESS NOTE ADULT - SUBJECTIVE AND OBJECTIVE BOX
Patient seen and examined at bedside. pt feels well. no active complaints     MEDICATIONS  (STANDING):  chlorhexidine 2% Cloths 1 Application(s) Topical <User Schedule>  furosemide    Tablet 40 milliGRAM(s) Oral daily  lidocaine 1% Injectable 10 milliLiter(s) Local Injection once  piperacillin/tazobactam IVPB.. 3.375 Gram(s) IV Intermittent every 8 hours  potassium phosphate / sodium phosphate Powder (PHOS-NaK) 1 Packet(s) Oral three times a day before meals  spironolactone 100 milliGRAM(s) Oral daily    MEDICATIONS  (PRN):  acetaminophen   Tablet .. 650 milliGRAM(s) Oral every 6 hours PRN Temp greater or equal to 38.5C (101.3F), Mild Pain (1 - 3)  aluminum hydroxide/magnesium hydroxide/simethicone Suspension 30 milliLiter(s) Oral every 4 hours PRN Dyspepsia  melatonin 3 milliGRAM(s) Oral at bedtime PRN Insomnia        Vital Signs Last 24 Hrs  T(C): 36.4 (20 Jul 2021 06:10), Max: 37.3 (19 Jul 2021 21:49)  T(F): 97.5 (20 Jul 2021 06:10), Max: 99.1 (19 Jul 2021 21:49)  HR: 70 (20 Jul 2021 06:10) (70 - 73)  BP: 124/90 (20 Jul 2021 06:10) (118/79 - 140/96)  BP(mean): --  RR: 18 (20 Jul 2021 06:10) (18 - 18)  SpO2: 100% (20 Jul 2021 06:10) (100% - 100%)      PHYSICAL EXAM:     GENERAL:  Appears stated age, well-groomed  HEENT:  NC/AT,    CHEST:  CTA b/l  HEART:  S1 s2+   ABDOMEN: distended, no pain   EXTEREMITIES:  no cyanosis,clubbing or edema    NEURO:  Alert, oriented, no asterixis                            7.2    17.31 )-----------( 143      ( 20 Jul 2021 07:05 )             23.9       07-20    138  |  101  |  11  ----------------------------<  75  3.8   |  20<L>  |  0.83    Ca    8.3<L>      20 Jul 2021 07:05  Phos  2.4     07-20  Mg     2.10     07-20    TPro  5.1<L>  /  Alb  2.5<L>  /  TBili  0.7  /  DBili  x   /  AST  18  /  ALT  7   /  AlkPhos  229<H>  07-20

## 2021-07-20 NOTE — PROGRESS NOTE ADULT - SUBJECTIVE AND OBJECTIVE BOX
Name of Patient : JAELYN CLARKE  MRN: [id number]  DATE OF SERVICE: 07-20-21 @ 16:47    Subjective: Patient seen and examined. No new events except as noted.   unable to for therapeutic paracentesis     REVIEW OF SYSTEMS:    CONSTITUTIONAL: No weakness, fevers or chills  EYES/ENT: No visual changes;  No vertigo or throat pain   NECK: No pain or stiffness  RESPIRATORY: No cough, wheezing, hemoptysis; No shortness of breath  CARDIOVASCULAR: No chest pain or palpitations  GASTROINTESTINAL: No abdominal or epigastric pain. No nausea, vomiting, or hematemesis; No diarrhea or constipation. No melena or hematochezia.  GENITOURINARY: No dysuria, frequency or hematuria  NEUROLOGICAL: No numbness or weakness  SKIN: No itching, burning, rashes, or lesions   All other review of systems is negative unless indicated above.    MEDICATIONS:  MEDICATIONS  (STANDING):  chlorhexidine 2% Cloths 1 Application(s) Topical <User Schedule>  furosemide    Tablet 40 milliGRAM(s) Oral daily  lidocaine 1% Injectable 10 milliLiter(s) Local Injection once  piperacillin/tazobactam IVPB.. 3.375 Gram(s) IV Intermittent every 8 hours  potassium phosphate / sodium phosphate Powder (PHOS-NaK) 1 Packet(s) Oral three times a day before meals  spironolactone 100 milliGRAM(s) Oral daily      PHYSICAL EXAM:  T(C): 36.4 (07-20-21 @ 13:41), Max: 37.3 (07-19-21 @ 21:49)  HR: 86 (07-20-21 @ 13:41) (70 - 86)  BP: 116/80 (07-20-21 @ 13:41) (116/80 - 124/90)  RR: 17 (07-20-21 @ 13:41) (17 - 18)  SpO2: 100% (07-20-21 @ 13:41) (100% - 100%)  Wt(kg): --  I&O's Summary    19 Jul 2021 07:01  -  20 Jul 2021 07:00  --------------------------------------------------------  IN: 450 mL / OUT: 1150 mL / NET: -700 mL          Appearance: Normal	  HEENT:  PERRLA   Lymphatic: No lymphadenopathy   Cardiovascular: Normal S1 S2, no JVD  Respiratory: normal effort , clear  Gastrointestinal:  Soft, distended  Skin: No rashes,  warm to touch  Psychiatry:  Mood & affect appropriate  Musculuskeletal: No edema      All labs, Imaging and EKGs personally reviewed     07-19-21 @ 07:01  -  07-20-21 @ 07:00  --------------------------------------------------------  IN: 450 mL / OUT: 1150 mL / NET: -700 mL                            7.2    17.31 )-----------( 143      ( 20 Jul 2021 07:05 )             23.9               07-20    138  |  101  |  11  ----------------------------<  75  3.8   |  20<L>  |  0.83    Ca    8.3<L>      20 Jul 2021 07:05  Phos  2.4     07-20  Mg     2.10     07-20    TPro  5.1<L>  /  Alb  2.5<L>  /  TBili  0.7  /  DBili  x   /  AST  18  /  ALT  7   /  AlkPhos  229<H>  07-20    PT/INR - ( 19 Jul 2021 08:19 )   PT: 14.5 sec;   INR: 1.29 ratio

## 2021-07-20 NOTE — CHART NOTE - NSCHARTNOTEFT_GEN_A_CORE
IR consulted for therapeutic paracentesis per Dr. Villa. Abdominal US ordered per discussion with IR to evaluate ascites. IR consulted for therapeutic paracentesis per Dr. Villa. Per IR small amount of ascites noted- not candidate for therapeutic tap at this time.

## 2021-07-21 LAB
ALBUMIN SERPL ELPH-MCNC: 2.6 G/DL — LOW (ref 3.3–5)
ALP SERPL-CCNC: 223 U/L — HIGH (ref 40–120)
ALT FLD-CCNC: 6 U/L — SIGNIFICANT CHANGE UP (ref 4–41)
ANION GAP SERPL CALC-SCNC: 13 MMOL/L — SIGNIFICANT CHANGE UP (ref 7–14)
AST SERPL-CCNC: 17 U/L — SIGNIFICANT CHANGE UP (ref 4–40)
BASOPHILS # BLD AUTO: 0.05 K/UL — SIGNIFICANT CHANGE UP (ref 0–0.2)
BASOPHILS NFR BLD AUTO: 0.3 % — SIGNIFICANT CHANGE UP (ref 0–2)
BILIRUB SERPL-MCNC: 0.8 MG/DL — SIGNIFICANT CHANGE UP (ref 0.2–1.2)
BUN SERPL-MCNC: 10 MG/DL — SIGNIFICANT CHANGE UP (ref 7–23)
CALCIUM SERPL-MCNC: 8.5 MG/DL — SIGNIFICANT CHANGE UP (ref 8.4–10.5)
CHLORIDE SERPL-SCNC: 101 MMOL/L — SIGNIFICANT CHANGE UP (ref 98–107)
CO2 SERPL-SCNC: 21 MMOL/L — LOW (ref 22–31)
CREAT SERPL-MCNC: 0.93 MG/DL — SIGNIFICANT CHANGE UP (ref 0.5–1.3)
EBV DNA SERPL NAA+PROBE-ACNC: SIGNIFICANT CHANGE UP IU/ML
EOSINOPHIL # BLD AUTO: 0.1 K/UL — SIGNIFICANT CHANGE UP (ref 0–0.5)
EOSINOPHIL NFR BLD AUTO: 0.5 % — SIGNIFICANT CHANGE UP (ref 0–6)
GLUCOSE SERPL-MCNC: 100 MG/DL — HIGH (ref 70–99)
HCT VFR BLD CALC: 23.7 % — LOW (ref 39–50)
HGB BLD-MCNC: 7.4 G/DL — LOW (ref 13–17)
IANC: 14.95 K/UL — HIGH (ref 1.5–8.5)
IGG SERPL-MCNC: 811 MG/DL — SIGNIFICANT CHANGE UP (ref 603–1613)
IGG1 SER-MCNC: 534 MG/DL — SIGNIFICANT CHANGE UP (ref 248–810)
IGG2 SER-MCNC: 100 MG/DL — LOW (ref 130–555)
IGG3 SER-MCNC: 45 MG/DL — SIGNIFICANT CHANGE UP (ref 15–102)
IGG4 SER-MCNC: 23 MG/DL — SIGNIFICANT CHANGE UP (ref 2–96)
IMM GRANULOCYTES NFR BLD AUTO: 2.8 % — HIGH (ref 0–1.5)
INR BLD: 1.31 RATIO — HIGH (ref 0.88–1.16)
LYMPHOCYTES # BLD AUTO: 0.94 K/UL — LOW (ref 1–3.3)
LYMPHOCYTES # BLD AUTO: 5 % — LOW (ref 13–44)
MAGNESIUM SERPL-MCNC: 2 MG/DL — SIGNIFICANT CHANGE UP (ref 1.6–2.6)
MCHC RBC-ENTMCNC: 22.1 PG — LOW (ref 27–34)
MCHC RBC-ENTMCNC: 31.2 GM/DL — LOW (ref 32–36)
MCV RBC AUTO: 70.7 FL — LOW (ref 80–100)
MITOCHONDRIA AB SER-ACNC: SIGNIFICANT CHANGE UP
MONOCYTES # BLD AUTO: 2.34 K/UL — HIGH (ref 0–0.9)
MONOCYTES NFR BLD AUTO: 12.4 % — SIGNIFICANT CHANGE UP (ref 2–14)
NEUTROPHILS # BLD AUTO: 14.95 K/UL — HIGH (ref 1.8–7.4)
NEUTROPHILS NFR BLD AUTO: 79 % — HIGH (ref 43–77)
NRBC # BLD: 0 /100 WBCS — SIGNIFICANT CHANGE UP
NRBC # FLD: 0 K/UL — SIGNIFICANT CHANGE UP
PHOSPHATE SERPL-MCNC: 3 MG/DL — SIGNIFICANT CHANGE UP (ref 2.5–4.5)
PLATELET # BLD AUTO: 152 K/UL — SIGNIFICANT CHANGE UP (ref 150–400)
POTASSIUM SERPL-MCNC: 4.3 MMOL/L — SIGNIFICANT CHANGE UP (ref 3.5–5.3)
POTASSIUM SERPL-SCNC: 4.3 MMOL/L — SIGNIFICANT CHANGE UP (ref 3.5–5.3)
PROT SERPL-MCNC: 5.3 G/DL — LOW (ref 6–8.3)
PROTHROM AB SERPL-ACNC: 14.7 SEC — HIGH (ref 10.6–13.6)
RBC # BLD: 3.35 M/UL — LOW (ref 4.2–5.8)
RBC # FLD: 25.8 % — HIGH (ref 10.3–14.5)
SMOOTH MUSCLE AB SER-ACNC: ABNORMAL
SODIUM SERPL-SCNC: 135 MMOL/L — SIGNIFICANT CHANGE UP (ref 135–145)
WBC # BLD: 18.91 K/UL — HIGH (ref 3.8–10.5)
WBC # FLD AUTO: 18.91 K/UL — HIGH (ref 3.8–10.5)

## 2021-07-21 PROCEDURE — 99232 SBSQ HOSP IP/OBS MODERATE 35: CPT | Mod: GC

## 2021-07-21 PROCEDURE — 99232 SBSQ HOSP IP/OBS MODERATE 35: CPT

## 2021-07-21 RX ADMIN — PANTOPRAZOLE SODIUM 40 MILLIGRAM(S): 20 TABLET, DELAYED RELEASE ORAL at 06:22

## 2021-07-21 RX ADMIN — PIPERACILLIN AND TAZOBACTAM 25 GRAM(S): 4; .5 INJECTION, POWDER, LYOPHILIZED, FOR SOLUTION INTRAVENOUS at 12:36

## 2021-07-21 RX ADMIN — Medication 1 PACKET(S): at 06:13

## 2021-07-21 RX ADMIN — SPIRONOLACTONE 100 MILLIGRAM(S): 25 TABLET, FILM COATED ORAL at 06:12

## 2021-07-21 RX ADMIN — Medication 40 MILLIGRAM(S): at 06:12

## 2021-07-21 RX ADMIN — PANTOPRAZOLE SODIUM 40 MILLIGRAM(S): 20 TABLET, DELAYED RELEASE ORAL at 17:56

## 2021-07-21 RX ADMIN — PIPERACILLIN AND TAZOBACTAM 25 GRAM(S): 4; .5 INJECTION, POWDER, LYOPHILIZED, FOR SOLUTION INTRAVENOUS at 06:22

## 2021-07-21 RX ADMIN — CHLORHEXIDINE GLUCONATE 1 APPLICATION(S): 213 SOLUTION TOPICAL at 11:10

## 2021-07-21 RX ADMIN — PIPERACILLIN AND TAZOBACTAM 25 GRAM(S): 4; .5 INJECTION, POWDER, LYOPHILIZED, FOR SOLUTION INTRAVENOUS at 21:02

## 2021-07-21 NOTE — PROGRESS NOTE ADULT - SUBJECTIVE AND OBJECTIVE BOX
Interval Events:   No abdominal pain  No nausea /vomiting / diarrhea   No melena / bloody bm     Hospital Medications:  acetaminophen   Tablet .. 650 milliGRAM(s) Oral every 6 hours PRN  aluminum hydroxide/magnesium hydroxide/simethicone Suspension 30 milliLiter(s) Oral every 4 hours PRN  chlorhexidine 2% Cloths 1 Application(s) Topical <User Schedule>  furosemide    Tablet 40 milliGRAM(s) Oral daily  lidocaine 1% Injectable 10 milliLiter(s) Local Injection once  melatonin 3 milliGRAM(s) Oral at bedtime PRN  pantoprazole  Injectable 40 milliGRAM(s) IV Push two times a day  piperacillin/tazobactam IVPB.. 3.375 Gram(s) IV Intermittent every 8 hours  spironolactone 100 milliGRAM(s) Oral daily        ROS:   General:  No fevers, chills or night sweats  ENT:  No sore throat or dysphagia  CV:  No pain or palpitations  Resp:  No dyspnea, cough or  wheezing  GI:  as above  Skin:  No rash or edema  Neuro: no weakness   Hematologic: no bleeding  Musculoskeletal: no muscle pain or join pain  Psych: no agitation      PHYSICAL EXAM:   Vital Signs:  Vital Signs Last 24 Hrs  T(C): 36.7 (21 Jul 2021 06:06), Max: 37.1 (20 Jul 2021 21:53)  T(F): 98.1 (21 Jul 2021 06:06), Max: 98.8 (20 Jul 2021 21:53)  HR: 68 (21 Jul 2021 06:06) (62 - 86)  BP: 120/82 (21 Jul 2021 06:06) (111/76 - 120/82)  BP(mean): --  RR: 18 (21 Jul 2021 06:06) (17 - 18)  SpO2: 100% (21 Jul 2021 06:06) (100% - 100%)  Daily     Daily     GENERAL:  NAD, Appears stated age  HEENT:  NC/AT,  conjunctivae clear and pink, sclera -anicteric  CHEST:  Normal Effort, Breath sounds clear  HEART:  RRR, S1 + S2, no murmurs  ABDOMEN:  Soft, non-tender, non-distended, normoactive bowel sounds,  no masses  EXTREMITIES:  no cyanosis or edema  SKIN:  Warm & Dry. No rash or erythema  NEURO:  Alert, oriented, no focal deficit    LABS:                        7.4    18.91 )-----------( 152      ( 21 Jul 2021 07:56 )             23.7     Mean Cell Volume: 70.7 fL (07-21-21 @ 07:56)    07-21    135  |  101  |  10  ----------------------------<  100<H>  4.3   |  21<L>  |  0.93    Ca    8.5      21 Jul 2021 07:56  Phos  3.0     07-21  Mg     2.00     07-21    TPro  5.3<L>  /  Alb  2.6<L>  /  TBili  0.8  /  DBili  x   /  AST  17  /  ALT  6   /  AlkPhos  223<H>  07-21    LIVER FUNCTIONS - ( 21 Jul 2021 07:56 )  Alb: 2.6 g/dL / Pro: 5.3 g/dL / ALK PHOS: 223 U/L / ALT: 6 U/L / AST: 17 U/L / GGT: x           PT/INR - ( 21 Jul 2021 07:56 )   PT: 14.7 sec;   INR: 1.31 ratio                                     7.4    18.91 )-----------( 152      ( 21 Jul 2021 07:56 )             23.7                         7.1    21.67 )-----------( 147      ( 20 Jul 2021 22:26 )             23.0                         7.2    17.31 )-----------( 143      ( 20 Jul 2021 07:05 )             23.9                         7.3    17.20 )-----------( 178      ( 19 Jul 2021 06:51 )             23.7       Imaging:  Reviewed         Interval Events:   Denies any abdominal pain, no blood in the stool    Hospital Medications:  acetaminophen   Tablet .. 650 milliGRAM(s) Oral every 6 hours PRN  aluminum hydroxide/magnesium hydroxide/simethicone Suspension 30 milliLiter(s) Oral every 4 hours PRN  chlorhexidine 2% Cloths 1 Application(s) Topical <User Schedule>  furosemide    Tablet 40 milliGRAM(s) Oral daily  lidocaine 1% Injectable 10 milliLiter(s) Local Injection once  melatonin 3 milliGRAM(s) Oral at bedtime PRN  pantoprazole  Injectable 40 milliGRAM(s) IV Push two times a day  piperacillin/tazobactam IVPB.. 3.375 Gram(s) IV Intermittent every 8 hours  spironolactone 100 milliGRAM(s) Oral daily        ROS:   General:  No fevers, chills or night sweats  ENT:  No sore throat or dysphagia  CV:  No pain or palpitations  Resp:  No dyspnea, cough or  wheezing  GI:  as above  Skin:  No rash or edema  Neuro: no weakness   Hematologic: no bleeding  Musculoskeletal: no muscle pain or join pain  Psych: no agitation      PHYSICAL EXAM:   Vital Signs:  Vital Signs Last 24 Hrs  T(C): 36.7 (21 Jul 2021 06:06), Max: 37.1 (20 Jul 2021 21:53)  T(F): 98.1 (21 Jul 2021 06:06), Max: 98.8 (20 Jul 2021 21:53)  HR: 68 (21 Jul 2021 06:06) (62 - 86)  BP: 120/82 (21 Jul 2021 06:06) (111/76 - 120/82)  BP(mean): --  RR: 18 (21 Jul 2021 06:06) (17 - 18)  SpO2: 100% (21 Jul 2021 06:06) (100% - 100%)  Daily     Daily     GENERAL:  NAD, Appears stated age  HEENT:  NC/AT,  conjunctivae clear and pink, sclera -anicteric  CHEST:  Normal Effort, Breath sounds clear  HEART:  RRR, S1 + S2, no murmurs  ABDOMEN:  distended but nontender  EXTREMITIES:  no cyanosis or edema  SKIN:  Warm & Dry. No rash or erythema  NEURO:  Alert, oriented, no focal deficit    LABS:                        7.4    18.91 )-----------( 152      ( 21 Jul 2021 07:56 )             23.7     Mean Cell Volume: 70.7 fL (07-21-21 @ 07:56)    07-21    135  |  101  |  10  ----------------------------<  100<H>  4.3   |  21<L>  |  0.93    Ca    8.5      21 Jul 2021 07:56  Phos  3.0     07-21  Mg     2.00     07-21    TPro  5.3<L>  /  Alb  2.6<L>  /  TBili  0.8  /  DBili  x   /  AST  17  /  ALT  6   /  AlkPhos  223<H>  07-21    LIVER FUNCTIONS - ( 21 Jul 2021 07:56 )  Alb: 2.6 g/dL / Pro: 5.3 g/dL / ALK PHOS: 223 U/L / ALT: 6 U/L / AST: 17 U/L / GGT: x           PT/INR - ( 21 Jul 2021 07:56 )   PT: 14.7 sec;   INR: 1.31 ratio                             7.4    18.91 )-----------( 152      ( 21 Jul 2021 07:56 )             23.7                         7.1    21.67 )-----------( 147      ( 20 Jul 2021 22:26 )             23.0                         7.2    17.31 )-----------( 143      ( 20 Jul 2021 07:05 )             23.9                         7.3    17.20 )-----------( 178      ( 19 Jul 2021 06:51 )             23.7       Imaging:  Reviewed         Interval Events:   Normal mental status, Denies any abdominal pain, no blood in the stool, no nausea or vomiting    Hospital Medications:  acetaminophen   Tablet .. 650 milliGRAM(s) Oral every 6 hours PRN  aluminum hydroxide/magnesium hydroxide/simethicone Suspension 30 milliLiter(s) Oral every 4 hours PRN  chlorhexidine 2% Cloths 1 Application(s) Topical <User Schedule>  furosemide    Tablet 40 milliGRAM(s) Oral daily  lidocaine 1% Injectable 10 milliLiter(s) Local Injection once  melatonin 3 milliGRAM(s) Oral at bedtime PRN  pantoprazole  Injectable 40 milliGRAM(s) IV Push two times a day  piperacillin/tazobactam IVPB.. 3.375 Gram(s) IV Intermittent every 8 hours  spironolactone 100 milliGRAM(s) Oral daily        ROS:   General:  No fevers, chills or night sweats  ENT:  No sore throat or dysphagia  CV:  No pain or palpitations  Resp:  No dyspnea, cough or  wheezing  GI:  as above  Skin:  No rash or edema  Neuro: no weakness   Hematologic: no bleeding  Musculoskeletal: no muscle pain or join pain  Psych: no agitation      PHYSICAL EXAM:   Vital Signs:  Vital Signs Last 24 Hrs  T(C): 36.7 (21 Jul 2021 06:06), Max: 37.1 (20 Jul 2021 21:53)  T(F): 98.1 (21 Jul 2021 06:06), Max: 98.8 (20 Jul 2021 21:53)  HR: 68 (21 Jul 2021 06:06) (62 - 86)  BP: 120/82 (21 Jul 2021 06:06) (111/76 - 120/82)  BP(mean): --  RR: 18 (21 Jul 2021 06:06) (17 - 18)  SpO2: 100% (21 Jul 2021 06:06) (100% - 100%)  Daily     Daily     GENERAL:  NAD, Appears stated age  HEENT:  NC/AT,  conjunctivae clear and pink, sclera -anicteric  CHEST:  Normal Effort, Breath sounds clear  HEART:  RRR, S1 + S2, no murmurs  ABDOMEN:  distended but nontender  EXTREMITIES:  no cyanosis or edema  SKIN:  Warm & Dry. No rash or erythema  NEURO:  Alert, oriented, no focal deficit    LABS:                        7.4    18.91 )-----------( 152      ( 21 Jul 2021 07:56 )             23.7     Mean Cell Volume: 70.7 fL (07-21-21 @ 07:56)    07-21    135  |  101  |  10  ----------------------------<  100<H>  4.3   |  21<L>  |  0.93    Ca    8.5      21 Jul 2021 07:56  Phos  3.0     07-21  Mg     2.00     07-21    TPro  5.3<L>  /  Alb  2.6<L>  /  TBili  0.8  /  DBili  x   /  AST  17  /  ALT  6   /  AlkPhos  223<H>  07-21    LIVER FUNCTIONS - ( 21 Jul 2021 07:56 )  Alb: 2.6 g/dL / Pro: 5.3 g/dL / ALK PHOS: 223 U/L / ALT: 6 U/L / AST: 17 U/L / GGT: x           PT/INR - ( 21 Jul 2021 07:56 )   PT: 14.7 sec;   INR: 1.31 ratio                             7.4    18.91 )-----------( 152      ( 21 Jul 2021 07:56 )             23.7                         7.1    21.67 )-----------( 147      ( 20 Jul 2021 22:26 )             23.0                         7.2    17.31 )-----------( 143      ( 20 Jul 2021 07:05 )             23.9                         7.3    17.20 )-----------( 178      ( 19 Jul 2021 06:51 )             23.7       Imaging:  Reviewed

## 2021-07-21 NOTE — PROGRESS NOTE ADULT - SUBJECTIVE AND OBJECTIVE BOX
Follow Up:  fever    Interval History: pt afebrile and WBC still fluctuating today 18, s/p repeat paracentesis yesterday    ROS:      All other systems negative    Constitutional: no fever, no chills  Cardiovascular:  no chest pain, no palpitation  Respiratory:  no SOB, no cough  GI:  + abd discomfort and distention, no vomiting, no diarrhea  urinary: no dysuria, no hematuria, no flank pain  musculoskeletal:  no joint pain, no joint swelling  skin:  no rash  neurology:  no headache, no seizure        Allergies  No Known Allergies        ANTIMICROBIALS:  piperacillin/tazobactam IVPB.. 3.375 every 8 hours      OTHER MEDS:  acetaminophen   Tablet .. 650 milliGRAM(s) Oral every 6 hours PRN  aluminum hydroxide/magnesium hydroxide/simethicone Suspension 30 milliLiter(s) Oral every 4 hours PRN  chlorhexidine 2% Cloths 1 Application(s) Topical <User Schedule>  furosemide    Tablet 40 milliGRAM(s) Oral daily  lidocaine 1% Injectable 10 milliLiter(s) Local Injection once  melatonin 3 milliGRAM(s) Oral at bedtime PRN  pantoprazole  Injectable 40 milliGRAM(s) IV Push two times a day  spironolactone 100 milliGRAM(s) Oral daily      Vital Signs Last 24 Hrs  T(C): 37 (21 Jul 2021 13:34), Max: 37.1 (20 Jul 2021 21:53)  T(F): 98.6 (21 Jul 2021 13:34), Max: 98.8 (20 Jul 2021 21:53)  HR: 67 (21 Jul 2021 13:34) (62 - 76)  BP: 139/81 (21 Jul 2021 13:34) (111/76 - 139/81)  BP(mean): --  RR: 16 (21 Jul 2021 13:34) (16 - 18)  SpO2: 100% (21 Jul 2021 13:34) (100% - 100%)    Physical Exam:  General:    NAD,  non toxic  Cardio:     regular S1, S2,  no murmur  Respiratory:    clear b/l,    no wheezing  abd:   distended with ascites but  soft,   BS +,   no tenderness  :   no CVAT,  no suprapubic tenderness,   no  shetty  Musculoskeletal:   no joint swelling  vascular: no phlebitis, R chest port  Skin:    no rash                          7.4    18.91 )-----------( 152      ( 21 Jul 2021 07:56 )             23.7       07-21    135  |  101  |  10  ----------------------------<  100<H>  4.3   |  21<L>  |  0.93    Ca    8.5      21 Jul 2021 07:56  Phos  3.0     07-21  Mg     2.00     07-21    TPro  5.3<L>  /  Alb  2.6<L>  /  TBili  0.8  /  DBili  x   /  AST  17  /  ALT  6   /  AlkPhos  223<H>  07-21          MICROBIOLOGY:  v  .Body Fluid Peritoneal Fluid  07-20-21   No growth  --    polymorphonuclear leukocytes seen  No organisms seen  by cytocentrifuge      .Body Fluid Peritoneal Fluid  07-15-21   Testing in progress  --  --      .Body Fluid Peritoneal Fluid  07-14-21   No growth  --    polymorphonuclear leukocytes seen  No organisms seen  by cytocentrifuge      .Blood Port Device  07-14-21   No Growth Final  --  --      .Blood Blood-Peripheral  07-14-21   No Growth Final  --  --          Rapid RVP Result: NotDetec (07-14 @ 23:27)        RADIOLOGY:  Images independently visualized and reviewed personally, findings as below  < from: MR Abdomen w/wo IV Cont (07.20.21 @ 21:30) >  IMPRESSION:  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    No evidence of susan necrosis in the liver, as clinically questioned.    < end of copied text >

## 2021-07-21 NOTE — PROGRESS NOTE ADULT - ASSESSMENT
66M active colorectal cancer (mets to liver first dx'd Dec 2020, started chemo in Feb 2021) and DVTs on Xarelto presented to the ED for fever x5d and altered mental status. He was first dx'd in Dec 2021 and started on chemo Feb 2021 and last chemo on Monday. He has known ascitic fluid without known cirrhosis but known to have extensive metastasis to the liver. No other h/o liver disease. Hepatology consulted for management/workup of ascites. He had 3 prior paracentesis in the past (prior to this admission) all done at [facility] [place]. SAAG > 1.1, ascitic . Concern this could be malignant ascites and intraabdominal infection due to possible necrotic liver mets. Ascitic fluid cx 7/15 with no bacterial growth + no organisms on gram stain.    #colon cancer with extensive liver mets- follows at [facility] [place], last chemo 7/12  #elevated alk phos- likely 2/2 infiltrative disease from liver mets    #Peritonitis; ascitic fluid with elevated PMNs (652) c/f peritonitis, SAAG > 1.1 c/w portal hypertension most likely from extent of liver infiltration + tumor burden rather than cirrhosis. Liver duplex negative for PV thrombosis. NO previous imaging on file to assess for cirrhosis.  #Leukocytosis - very elevated to > 70k, now improving w/ abx    #Thrombocytopenia: down trending in past 3-4 days.    #Anemia:  -Most likely in setting of infection. Iron panel not concerning for MICKEY. BUN < 20 and brown stool, making active bleeding less likely. Guaic + could be in setting of malignancy, would benefit of repeat Colonsocopy but afterwards as outpatient after peritonitis treatment. Hgb stable since admission.    Recommendations:  -        Hima Lang MD   Gastroenterology Fellow  Pager: [phone]  Please call answering service [phone] / on-call GI fellow after 5pm and before 8am, and on weekends. 66M active colorectal cancer (mets to liver first dx'd Dec 2020, started chemo in Feb 2021) and DVTs on Xarelto presented to the ED for fever x5d and altered mental status. He was first dx'd in Dec 2021 and started on chemo Feb 2021 and last chemo on Monday. He has known ascitic fluid without known cirrhosis but known to have extensive metastasis to the liver. No other h/o liver disease. Hepatology consulted for management/workup of ascites. He had 3 prior paracentesis in the past (prior to this admission) all done at [facility] [place]. SAAG > 1.1, ascitic . Concern this could be malignant ascites and intraabdominal infection due to possible necrotic liver mets. Ascitic fluid cx 7/15 with no bacterial growth + no organisms on gram stain.    #colon cancer with extensive liver mets- follows at [facility] [place], last chemo 7/12  #elevated alk phos- likely 2/2 infiltrative disease from liver mets    #Peritonitis; ascitic fluid with elevated PMNs (652) c/f peritonitis, SAAG > 1.1 c/w portal hypertension most likely from extent of liver infiltration + tumor burden rather than cirrhosis. Liver duplex negative for PV thrombosis. Repeat paracentesis remarkable for decreased in PMN, indicating response to current treatment.    Continue diuretic regimen for ascites. Diuretic and low salt diet may suffice, but paracentesis requirement will be determined by patient symptoms. Family and patient were educated regarding ascites and possibility of requiring outpatient paracentesis which could be set up by patient oncologist.     #Leukocytosis - very elevated to > 70k, now improving w/ abx    #Thrombocytopenia: down trending in past 3-4 days.    #Anemia:  -Most likely in setting of infection. Iron panel not concerning for MICKEY. BUN < 20 and brown stool, making active bleeding less likely. Guaic + in setting of malignancy in colon, which would be expected. Hgb stable since admission. Patient can follow up Dr. Alo Pagan, (GI who perform his colonoscopy) and determine the next step after peritonitis management.     Recommendations:  -Continue abx, duration as indicated by ID  -No inpatient anemia workup, outpatient. (Reason as noted above)  -Low salt diet and diuretics (lasix 40 mg and spironolactone 100 mg daily.     Hima Lang MD   Gastroenterology Fellow  Pager: [phone]  Please call answering service [phone] / on-call GI fellow after 5pm and before 8am, and on weekends. 66M active colorectal cancer (mets to liver first dx'd Dec 2020, started chemo in Feb 2021) and DVTs on Xarelto presented to the ED for fever x5d and altered mental status. He was first dx'd in Dec 2021 and started on chemo Feb 2021 and last chemo on Monday. He has known ascitic fluid without known cirrhosis but known to have extensive metastasis to the liver. No other h/o liver disease. Hepatology consulted for management/workup of ascites. He had 3 prior paracentesis in the past (prior to this admission) all done at [facility] [place] [place]. SAAG > 1.1, ascitic . Concern this could be malignant ascites and intraabdominal infection due to possible necrotic liver mets. Ascitic fluid cx 7/15 with no bacterial growth + no organisms on gram stain.    #colon cancer with extensive liver mets- follows at [facility] [place] [place], last chemo 7/12  #elevated alk phos- likely 2/2 infiltrative disease from liver mets    #Peritonitis; ascitic fluid with elevated PMNs (652) c/f peritonitis, SAAG > 1.1 c/w portal hypertension most likely from extent of liver infiltration + tumor burden rather than cirrhosis. Liver duplex negative for PV thrombosis. Repeat paracentesis remarkable for decreased in PMN, indicating response to current treatment.    Continue diuretic regimen for ascites. Diuretic and low salt diet may suffice, but paracentesis requirement will be determined by patient symptoms. Family and patient were educated regarding ascites and possibility of requiring outpatient paracentesis which could be set up by patient oncologist.     #Leukocytosis - very elevated to > 70k, now improving w/ abx    #Thrombocytopenia: down trending in past 3-4 days.    #Anemia:  -Most likely in setting of infection. Iron panel not concerning for MICKEY. BUN < 20 and brown stool, making active bleeding less likely. Guaic + in setting of malignancy in colon, which would be expected. Hgb stable since admission and at baseline (8, clarified with outside Oncology team) Patient can follow up Dr. Alo Pagan, (GI who perform his colonoscopy) and determine the next step after peritonitis management.     Recommendations:  -Continue abx, duration as indicated by ID  -No inpatient anemia workup, outpatient. (Reason as noted above)  -Low salt diet and diuretics (lasix 40 mg and spironolactone 100 mg daily.     Hima Lang MD   Gastroenterology Fellow  Pager: [phone]  Please call answering service [phone] / on-call GI fellow after 5pm and before 8am, and on weekends. 66M active colorectal cancer (mets to liver first dx'd Dec 2020, started chemo in Feb 2021) and DVTs on Xarelto presented to the ED for fever x5d and altered mental status. He was first dx'd in Dec 2021 and started on chemo Feb 2021 and last chemo on Monday. He has known ascitic fluid without known cirrhosis but known to have extensive metastasis to the liver. No other h/o liver disease. Hepatology consulted for management/workup of ascites. He had 3 prior paracentesis in the past (prior to this admission) all done at [facility] [place]. SAAG > 1.1, ascitic . Concern this could be malignant ascites and intraabdominal infection due to possible necrotic liver mets. Ascitic fluid cx 7/15 with no bacterial growth + no organisms on gram stain.    #colon cancer with extensive liver mets- follows at [facility] [place], last chemo 7/12  #elevated alk phos- likely 2/2 infiltrative disease from liver mets    #Peritonitis; ascitic fluid with elevated PMNs (652) c/f peritonitis, SAAG > 1.1 c/w portal hypertension most likely from extent of liver infiltration + tumor burden rather than cirrhosis. Liver duplex negative for PV thrombosis. Repeat paracentesis remarkable for decreased in PMN, indicating response to current treatment.    Continue diuretic regimen for ascites. Diuretic and low salt diet may suffice, but paracentesis requirement will be determined by patient symptoms. Family and patient were educated regarding ascites and possibility of requiring outpatient paracentesis which could be set up by patient's GI or oncologist.     #Leukocytosis - very elevated to > 70k, now improving w/ abx    #Thrombocytopenia: down trending in past 3-4 days.    #Anemia:  -Most likely in setting of infection. Iron panel not concerning for MICKEY. BUN < 20 and brown stool, making active bleeding less likely. Guaic + in setting of malignancy in colon, which would be expected. Hgb stable since admission and at baseline (8, clarified with outside Oncology team) Patient can follow up Dr. Alo Pagan, (GI who perform his colonoscopy) and determine the next step after peritonitis management.     Recommendations:  -Continue abx, duration as indicated by ID  -No inpatient anemia workup, outpatient. (Reason as noted above)  -Low salt diet and diuretics (lasix 40 mg and spironolactone 100 mg daily.     Hima Lang MD   Gastroenterology Fellow  Pager: [phone]  Please call answering service [phone] / on-call GI fellow after 5pm and before 8am, and on weekends.

## 2021-07-21 NOTE — PROGRESS NOTE ADULT - ASSESSMENT
66 m with active colorectal cancer (mets to liver first dx'd Dec 2020, started chemo in Feb 2021), DVTs on Xarelto, known ascites with no cirrhosis, last tap 7/7 now p/w fever and AMS after the last chemo (4 days ago), has abd discomfort but no cough, diarrhea, dysuria  here afebrile, WBC: 46  s/p paracentesis, WBC:652, PMN 45%    fever, tachycardia, AMS, leukocytosis  in the setting of metastatic colon ca, ascites, s/p tap which is s/o peritonitis  CT showed   Colorectal cancer of the ascending colon with extensive metastatic disease to the liver.   Large volume of ascites with nodular thickening along the right parietal peritoneum which may be secondary to peritoneal carcinomatosis vs peritonitis     * c/w zosyn, started 7/15 now day 7  * repeat tap yesterday showed 557 WBC and 28% PMN so improved from SBP point of view  * on discharge switch to PO cipro 500 qd for SBP ppx  * monitor the WBC/diff and temp curve    The above assessment and plan was discussed with the primary team    Grace Perez MD  Pager [phone]  After 5pm and on weekends call [phone]

## 2021-07-21 NOTE — PROGRESS NOTE ADULT - SUBJECTIVE AND OBJECTIVE BOX
Name of Patient : JAELYN CLARKE  MRN: [id number]  DATE OF SERVICE: 07-21-21     Subjective: Patient seen and examined. No new events except as noted.   doing okay     REVIEW OF SYSTEMS:    CONSTITUTIONAL: No weakness, fevers or chills  EYES/ENT: No visual changes;  No vertigo or throat pain   NECK: No pain or stiffness  RESPIRATORY: No cough, wheezing, hemoptysis; No shortness of breath  CARDIOVASCULAR: No chest pain or palpitations  GASTROINTESTINAL: No abdominal or epigastric pain. No nausea, vomiting, or hematemesis; No diarrhea or constipation. No melena or hematochezia.  GENITOURINARY: No dysuria, frequency or hematuria  NEUROLOGICAL: No numbness or weakness  SKIN: No itching, burning, rashes, or lesions   All other review of systems is negative unless indicated above.    MEDICATIONS:  MEDICATIONS  (STANDING):  chlorhexidine 2% Cloths 1 Application(s) Topical <User Schedule>  furosemide    Tablet 40 milliGRAM(s) Oral daily  lidocaine 1% Injectable 10 milliLiter(s) Local Injection once  pantoprazole  Injectable 40 milliGRAM(s) IV Push two times a day  piperacillin/tazobactam IVPB.. 3.375 Gram(s) IV Intermittent every 8 hours  spironolactone 100 milliGRAM(s) Oral daily      PHYSICAL EXAM:  T(C): 37.1 (07-21-21 @ 22:10), Max: 37.1 (07-21-21 @ 22:10)  HR: 69 (07-21-21 @ 22:10) (62 - 89)  BP: 112/74 (07-21-21 @ 22:10) (111/76 - 139/81)  RR: 16 (07-21-21 @ 22:10) (16 - 18)  SpO2: 100% (07-21-21 @ 22:10) (100% - 100%)  Wt(kg): --  I&O's Summary    20 Jul 2021 07:01  -  21 Jul 2021 07:00  --------------------------------------------------------  IN: 0 mL / OUT: 600 mL / NET: -600 mL    21 Jul 2021 07:01  -  21 Jul 2021 23:20  --------------------------------------------------------  IN: 1000 mL / OUT: 1200 mL / NET: -200 mL          Appearance: Normal	  HEENT:  PERRLA   Lymphatic: No lymphadenopathy   Cardiovascular: Normal S1 S2, no JVD  Respiratory: normal effort , clear  Gastrointestinal:  Soft, Non-tender  Skin: No rashes,  warm to touch  Psychiatry:  Mood & affect appropriate  Musculuskeletal: No edema      All labs, Imaging and EKGs personally reviewed       07-20-21 @ 07:01  -  07-21-21 @ 07:00  --------------------------------------------------------  IN: 0 mL / OUT: 600 mL / NET: -600 mL    07-21-21 @ 07:01  -  07-21-21 @ 23:20  --------------------------------------------------------  IN: 1000 mL / OUT: 1200 mL / NET: -200 mL                            7.4    18.91 )-----------( 152      ( 21 Jul 2021 07:56 )             23.7               07-21    135  |  101  |  10  ----------------------------<  100<H>  4.3   |  21<L>  |  0.93    Ca    8.5      21 Jul 2021 07:56  Phos  3.0     07-21  Mg     2.00     07-21    TPro  5.3<L>  /  Alb  2.6<L>  /  TBili  0.8  /  DBili  x   /  AST  17  /  ALT  6   /  AlkPhos  223<H>  07-21    PT/INR - ( 21 Jul 2021 07:56 )   PT: 14.7 sec;   INR: 1.31 ratio                < from: MR Abdomen w/wo IV Cont (07.20.21 @ 21:30) >  IMPRESSION:  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    No evidence of susan necrosis in the liver, as clinically questioned.

## 2021-07-21 NOTE — CHART NOTE - NSCHARTNOTEFT_GEN_A_CORE
MRI abdomen with hepatic mets (no evidence of necrosis) and large volume ascites.   IR consulted yesterday for therapeutic tap however not indicated at that time based on abdominal US. Updated IR of new imaging findings c/w large volume ascites. IR deferred to procedure team. Will re-consult procedure team for possible therapeutic paracentesis. Will continue to hold AC at this time. Discussed with Dr. Villa.

## 2021-07-21 NOTE — PROGRESS NOTE ADULT - ASSESSMENT
# SEPSIS, ALTERED MENTATION  # SPONTANEOUS BACTERIAL PERITONITIS    ·	Presented with sepsis secondary to SBP  ·	Paracentesis consistent with SBP  ·	On zosyn; ID following, appreciate recs  ·	AMS resolve, pt aox3   ·	CT abd report complete -- will need to compare to previous baseline at [facility]        # METASTATIC COLON CANCER  # LIVER METASTASES  # RECURRENT ASCITES  # DVT 2/2021, ON XARELTO OUTPATIENT    ·	Ascites could be malignant; ascitic fliud non diagnostic   ·	On outpatient chemotherapy, hold while inpatient  ·	Will update [facility]  ·	- hepatology f/u of MRI of abd to r/o necrotic liver mets       # LEUKOCYTOSIS  # ANEMIA  - likely be secondary to infection given marked improvement in leukocytosis with abx- wbc improving today  - also had nuelasta on 7/14   - microcytic anemia likely secondary to chemo   - B12, folate normal; iron studies with ACID  - cont to monitor   - transfuse to keep hg >7   - guiac +   - GI consult     We are in touch with [facility] and they have asked us to follow along as consultants. Please call with any questions.    Ramy Young MD  [facility]  Cell: [phone]   # SEPSIS, ALTERED MENTATION  # SPONTANEOUS BACTERIAL PERITONITIS    ·	Presented with sepsis secondary to SBP  ·	Paracentesis consistent with SBP  ·	On zosyn; ID following, appreciate recs  ·	AMS resolve, pt aox3   ·	CT abd report complete -- will need to compare to previous baseline at [facility]  - s/p US of abdomen by IR yest, minimal amt of fluid, therapeutic paracentesis was not recommended      # METASTATIC COLON CANCER  # LIVER METASTASES  # RECURRENT ASCITES  # DVT 2/2021, ON XARELTO OUTPATIENT    ·	Ascites could be malignant; ascitic fliud non diagnostic   ·	On outpatient chemotherapy, hold while inpatient  ·	Will update [facility]  ·	US yest with only small amt of ascitic fluild  ·	- hepatology f/u of MRI of abd to r/o necrotic liver mets       # LEUKOCYTOSIS  # ANEMIA  - likely be secondary to infection given marked improvement in leukocytosis with abx- wbc improving today  - also had nuelasta on 7/14   - microcytic anemia likely secondary to chemo   - B12, folate normal; iron studies with ACID  - cont to monitor   - transfuse to keep hg >7   - guiac +   - GI consult     We are in touch with [facility] and they have asked us to follow along as consultants. Please call with any questions.    Ramy Young MD  [facility]  Cell: [phone]

## 2021-07-21 NOTE — PROGRESS NOTE ADULT - SUBJECTIVE AND OBJECTIVE BOX
Patient seen, feeling well      MEDICATIONS  (STANDING):  chlorhexidine 2% Cloths 1 Application(s) Topical <User Schedule>  furosemide    Tablet 40 milliGRAM(s) Oral daily  lidocaine 1% Injectable 10 milliLiter(s) Local Injection once  pantoprazole  Injectable 40 milliGRAM(s) IV Push two times a day  piperacillin/tazobactam IVPB.. 3.375 Gram(s) IV Intermittent every 8 hours  spironolactone 100 milliGRAM(s) Oral daily    MEDICATIONS  (PRN):  acetaminophen   Tablet .. 650 milliGRAM(s) Oral every 6 hours PRN Temp greater or equal to 38.5C (101.3F), Mild Pain (1 - 3)  aluminum hydroxide/magnesium hydroxide/simethicone Suspension 30 milliLiter(s) Oral every 4 hours PRN Dyspepsia  melatonin 3 milliGRAM(s) Oral at bedtime PRN Insomnia      ROS  No fever, sweats, chills  No epistaxis, HA, sore throat  No CP, SOB, cough, sputum  No n/v/d, abd pain, melena, hematochezia  No edema  No rash  No anxiety  No back pain, joint pain  No bleeding, bruising  No dysuria, hematuria    Vital Signs Last 24 Hrs  T(C): 36.8 (21 Jul 2021 10:05), Max: 37.1 (20 Jul 2021 21:53)  T(F): 98.2 (21 Jul 2021 10:05), Max: 98.8 (20 Jul 2021 21:53)  HR: 76 (21 Jul 2021 10:05) (62 - 86)  BP: 118/79 (21 Jul 2021 10:05) (111/76 - 120/82)  BP(mean): --  RR: 17 (21 Jul 2021 10:05) (17 - 18)  SpO2: 100% (21 Jul 2021 10:05) (100% - 100%)    PE  NAD  Awake, alert  Anicteric, MMM  RRR  CTAB  Abd soft, NT, ND  No c/c/e  No rash grossly  FROM                          7.4    18.91 )-----------( 152      ( 21 Jul 2021 07:56 )             23.7       07-21    135  |  101  |  10  ----------------------------<  100<H>  4.3   |  21<L>  |  0.93    Ca    8.5      21 Jul 2021 07:56  Phos  3.0     07-21  Mg     2.00     07-21    TPro  5.3<L>  /  Alb  2.6<L>  /  TBili  0.8  /  DBili  x   /  AST  17  /  ALT  6   /  AlkPhos  223<H>  07-21

## 2021-07-21 NOTE — PROGRESS NOTE ADULT - ATTENDING COMMENTS
A 66M, prostate cancer s/p TURP, colon cancer dx 12/2020 with extensive liver mets,  chemo since 2/2021, last dose 5 day prior to admission, [place] 2 [place] [facility] [place] on rivaroxaban, presented with fever and AMS found to have sepsis, was seen for ascites and peritonitis.   Patient with no hepatic encephalopathy, distended abdomen without tenderness or rebound., No hematemesis, melena or rectal bleeding but stool positive for occult blood testing. Marked leukocytosis downtrended but remained 17-18,000. Normal AST, ALT and TB, elevated ALP to <2x. Repeat diagnostic paracentesis showed improvement of peritonitis.   Spoke with patient’s son on the phone today, and additional information from patient oncologist’s office as well. Patient had colonoscopy several months ago found to have ascending colon cancer, and subsequent workup showed extensive liver mets. Underwent chemotherapy without resection given advanced disease. Repeat abdominal imaging reported some response to chemotherapy, not clear about etiology of ascites, no mention of malignant ascites per outside provider. Hb baseline was around 8.     A/P: Advanced metastatic colon cancer to the liver, nodular liver surface likely due to the extensive metastatic liver disease. Positive hemoccult testing is likely resulted from underlying colon cancer. No active GI bleeding and no indication for endoscopic intervention for now.  Would recommend- get outside medical records, continue antibiotics per ID, diuretics if blood pressure stable, therapeutic paracentesis as needed and ascitic fluid analysis for cytology, trend liver tests and INR, avoid hepatotoxic agents, follow with his GI and oncologist after discharge,  and continue rest of care per primary team.

## 2021-07-22 LAB
ALBUMIN SERPL ELPH-MCNC: 2.6 G/DL — LOW (ref 3.3–5)
ALP SERPL-CCNC: 225 U/L — HIGH (ref 40–120)
ALT FLD-CCNC: 6 U/L — SIGNIFICANT CHANGE UP (ref 4–41)
ANA TITR SER: NEGATIVE — SIGNIFICANT CHANGE UP
ANION GAP SERPL CALC-SCNC: 12 MMOL/L — SIGNIFICANT CHANGE UP (ref 7–14)
AST SERPL-CCNC: 18 U/L — SIGNIFICANT CHANGE UP (ref 4–40)
BASOPHILS # BLD AUTO: 0 K/UL — SIGNIFICANT CHANGE UP (ref 0–0.2)
BASOPHILS NFR BLD AUTO: 0 % — SIGNIFICANT CHANGE UP (ref 0–2)
BILIRUB SERPL-MCNC: 0.7 MG/DL — SIGNIFICANT CHANGE UP (ref 0.2–1.2)
BLD GP AB SCN SERPL QL: NEGATIVE — SIGNIFICANT CHANGE UP
BUN SERPL-MCNC: 9 MG/DL — SIGNIFICANT CHANGE UP (ref 7–23)
CALCIUM SERPL-MCNC: 8.5 MG/DL — SIGNIFICANT CHANGE UP (ref 8.4–10.5)
CHLORIDE SERPL-SCNC: 100 MMOL/L — SIGNIFICANT CHANGE UP (ref 98–107)
CO2 SERPL-SCNC: 21 MMOL/L — LOW (ref 22–31)
CREAT SERPL-MCNC: 1 MG/DL — SIGNIFICANT CHANGE UP (ref 0.5–1.3)
EOSINOPHIL # BLD AUTO: 0 K/UL — SIGNIFICANT CHANGE UP (ref 0–0.5)
EOSINOPHIL NFR BLD AUTO: 0 % — SIGNIFICANT CHANGE UP (ref 0–6)
GLUCOSE SERPL-MCNC: 91 MG/DL — SIGNIFICANT CHANGE UP (ref 70–99)
HCT VFR BLD CALC: 23.1 % — LOW (ref 39–50)
HCT VFR BLD CALC: 26.6 % — LOW (ref 39–50)
HGB BLD-MCNC: 7 G/DL — CRITICAL LOW (ref 13–17)
HGB BLD-MCNC: 8.3 G/DL — LOW (ref 13–17)
IANC: 14.6 K/UL — HIGH (ref 1.5–8.5)
INR BLD: 1.25 RATIO — HIGH (ref 0.88–1.16)
LYMPHOCYTES # BLD AUTO: 0.5 K/UL — LOW (ref 1–3.3)
LYMPHOCYTES # BLD AUTO: 2.6 % — LOW (ref 13–44)
MAGNESIUM SERPL-MCNC: 2 MG/DL — SIGNIFICANT CHANGE UP (ref 1.6–2.6)
MCHC RBC-ENTMCNC: 22 PG — LOW (ref 27–34)
MCHC RBC-ENTMCNC: 23.1 PG — LOW (ref 27–34)
MCHC RBC-ENTMCNC: 30.3 GM/DL — LOW (ref 32–36)
MCHC RBC-ENTMCNC: 31.2 GM/DL — LOW (ref 32–36)
MCV RBC AUTO: 72.6 FL — LOW (ref 80–100)
MCV RBC AUTO: 74.1 FL — LOW (ref 80–100)
MONOCYTES # BLD AUTO: 1.17 K/UL — HIGH (ref 0–0.9)
MONOCYTES NFR BLD AUTO: 6.1 % — SIGNIFICANT CHANGE UP (ref 2–14)
NEUTROPHILS # BLD AUTO: 16.33 K/UL — HIGH (ref 1.8–7.4)
NEUTROPHILS NFR BLD AUTO: 83.3 % — HIGH (ref 43–77)
NRBC # BLD: 0 /100 WBCS — SIGNIFICANT CHANGE UP
NRBC # FLD: 0.03 K/UL — HIGH
PHOSPHATE SERPL-MCNC: 2.9 MG/DL — SIGNIFICANT CHANGE UP (ref 2.5–4.5)
PLATELET # BLD AUTO: 150 K/UL — SIGNIFICANT CHANGE UP (ref 150–400)
PLATELET # BLD AUTO: 179 K/UL — SIGNIFICANT CHANGE UP (ref 150–400)
POTASSIUM SERPL-MCNC: 4.4 MMOL/L — SIGNIFICANT CHANGE UP (ref 3.5–5.3)
POTASSIUM SERPL-SCNC: 4.4 MMOL/L — SIGNIFICANT CHANGE UP (ref 3.5–5.3)
PROT SERPL-MCNC: 5.3 G/DL — LOW (ref 6–8.3)
PROTHROM AB SERPL-ACNC: 14.2 SEC — HIGH (ref 10.6–13.6)
RBC # BLD: 3.18 M/UL — LOW (ref 4.2–5.8)
RBC # BLD: 3.59 M/UL — LOW (ref 4.2–5.8)
RBC # FLD: 26.2 % — HIGH (ref 10.3–14.5)
RBC # FLD: 26.8 % — HIGH (ref 10.3–14.5)
RH IG SCN BLD-IMP: POSITIVE — SIGNIFICANT CHANGE UP
SODIUM SERPL-SCNC: 133 MMOL/L — LOW (ref 135–145)
WBC # BLD: 18.79 K/UL — HIGH (ref 3.8–10.5)
WBC # BLD: 19.19 K/UL — HIGH (ref 3.8–10.5)
WBC # FLD AUTO: 18.79 K/UL — HIGH (ref 3.8–10.5)
WBC # FLD AUTO: 19.19 K/UL — HIGH (ref 3.8–10.5)

## 2021-07-22 PROCEDURE — 99232 SBSQ HOSP IP/OBS MODERATE 35: CPT

## 2021-07-22 RX ORDER — PIPERACILLIN AND TAZOBACTAM 4; .5 G/20ML; G/20ML
3.38 INJECTION, POWDER, LYOPHILIZED, FOR SOLUTION INTRAVENOUS EVERY 8 HOURS
Refills: 0 | Status: DISCONTINUED | OUTPATIENT
Start: 2021-07-22 | End: 2021-07-23

## 2021-07-22 RX ADMIN — CHLORHEXIDINE GLUCONATE 1 APPLICATION(S): 213 SOLUTION TOPICAL at 17:37

## 2021-07-22 RX ADMIN — PIPERACILLIN AND TAZOBACTAM 25 GRAM(S): 4; .5 INJECTION, POWDER, LYOPHILIZED, FOR SOLUTION INTRAVENOUS at 21:04

## 2021-07-22 RX ADMIN — PANTOPRAZOLE SODIUM 40 MILLIGRAM(S): 20 TABLET, DELAYED RELEASE ORAL at 17:37

## 2021-07-22 RX ADMIN — Medication 650 MILLIGRAM(S): at 23:02

## 2021-07-22 RX ADMIN — PANTOPRAZOLE SODIUM 40 MILLIGRAM(S): 20 TABLET, DELAYED RELEASE ORAL at 05:38

## 2021-07-22 RX ADMIN — Medication 40 MILLIGRAM(S): at 05:38

## 2021-07-22 RX ADMIN — PIPERACILLIN AND TAZOBACTAM 25 GRAM(S): 4; .5 INJECTION, POWDER, LYOPHILIZED, FOR SOLUTION INTRAVENOUS at 05:39

## 2021-07-22 RX ADMIN — SPIRONOLACTONE 100 MILLIGRAM(S): 25 TABLET, FILM COATED ORAL at 05:38

## 2021-07-22 NOTE — DISCHARGE NOTE PROVIDER - INSTRUCTIONS
Regular consistency  1500 ml fluid restriction  Regular consistency  1500 ml fluid restriction   Low salt diet

## 2021-07-22 NOTE — DISCHARGE NOTE PROVIDER - PROVIDER TOKENS
FREE:[LAST:[Antioine],FIRST:[Florencio],PHONE:[phone]],FAX:[(   )    -]],FREE:[LAST:[[facility]],PHONE:[(   )    -],FAX:[(   )    -]] FREE:[LAST:[Antioine],FIRST:[Florencio],PHONE:[phone]],FAX:[(   )    -]],FREE:[LAST:[[facility]],PHONE:[(   )    -],FAX:[(   )    -],FOLLOWUP:[1 week]],PROVIDER:[TOKEN:[[id number]],FOLLOWUP:[1 week]]

## 2021-07-22 NOTE — CHART NOTE - NSCHARTNOTEFT_GEN_A_CORE
Late chart note:  Procedure team consulted to possible abdominal paracentesis today given large volume ascites on MRI. Awaiting final recommendations. Per pt, possible tap tomorrow by procedure team.

## 2021-07-22 NOTE — PROVIDER CONTACT NOTE (CRITICAL VALUE NOTIFICATION) - ASSESSMENT
VS stable, no s/s of infection
Patient A&Ox4. Patient asymptomatic and denies chest pain, dizziness, palpitations, headache. No acute distress noted.
VS stable, no s/s of infection
H&H 7.0/23.1

## 2021-07-22 NOTE — PROVIDER CONTACT NOTE (CRITICAL VALUE NOTIFICATION) - ACTION/TREATMENT ORDERED:
Per provider vitals will be checked. Safety maintained. Will continue to monitor.
repeat CBC
continue to monitor

## 2021-07-22 NOTE — DISCHARGE NOTE PROVIDER - NSDCCPCAREPLAN_GEN_ALL_CORE_FT
PRINCIPAL DISCHARGE DIAGNOSIS  Diagnosis: Fever  Assessment and Plan of Treatment: Secondary to SBP (spontaneous bacterial peritonitis). You were evaluated by infectious disease team and treated with IV antibiotics. Continue with prophylactic antibiotics as prescribed. You underwent paracentesis in order to remove ascitic fluid from your abdomen.      SECONDARY DISCHARGE DIAGNOSES  Diagnosis: Ascites  Assessment and Plan of Treatment: You underwent paracentesis in order to remove ascitic fluid from your abdomen. Continue with out patient follow up with your oncologist at [facility] [place] upon discharge.    Diagnosis: Chronic deep vein thrombosis (DVT) of lower extremity, unspecified laterality, unspecified vein  Assessment and Plan of Treatment: Chronic deep vein thrombosis (DVT) of lower extremity, unspecified laterality, unspecified vein     PRINCIPAL DISCHARGE DIAGNOSIS  Diagnosis: Fever  Assessment and Plan of Treatment: Secondary to SBP (spontaneous bacterial peritonitis). You were evaluated by infectious disease team and treated with IV antibiotics. Continue with prophylactic antibiotics as prescribed. You underwent paracentesis in order to remove ascitic fluid from your abdomen.      SECONDARY DISCHARGE DIAGNOSES  Diagnosis: Ascites  Assessment and Plan of Treatment: You underwent paracentesis in order to remove ascitic fluid from your abdomen. Continue with out patient follow up with your oncologist at [facility] upon discharge.    Diagnosis: Colorectal cancer  Assessment and Plan of Treatment: Out patient follow up with your oncologist at [facility].    Diagnosis: Chronic deep vein thrombosis (DVT) of lower extremity, unspecified laterality, unspecified vein  Assessment and Plan of Treatment: Continue with Xarelto.    Diagnosis: Essential hypertension  Assessment and Plan of Treatment: Your blood pressure medications were discontinued. Continue to monitor your blood pressure as out patient and follow up with your primary care provider for possible resumption of medications as an out patient.     PRINCIPAL DISCHARGE DIAGNOSIS  Diagnosis: Fever  Assessment and Plan of Treatment: Secondary to SBP (spontaneous bacterial peritonitis). You were evaluated by infectious disease team and treated with IV antibiotics. Continue with prophylactic antibiotics as prescribed. You underwent paracentesis in order to remove ascitic fluid from your abdomen.      SECONDARY DISCHARGE DIAGNOSES  Diagnosis: Ascites  Assessment and Plan of Treatment: You underwent paracentesis in order to remove ascitic fluid from your abdomen- you had 3.8L drained on 7/23/2021. Continue with out patient [place] [facility] [place] Continue lasix and aldactone as prescribed.    Diagnosis: Chronic deep vein thrombosis (DVT) of lower extremity, unspecified laterality, unspecified vein  Assessment and Plan of Treatment: Continue with Xarelto--> RESUME ON 7/24/2021. Monitor for signs/symptoms indicating worsening of disease, such as, vomiting/coughing blood, bloody stools/blood in urine, easy bleeding/bruising, pale skin, fatigue, dizziness, increased heart rate, or chest pain. Please notify your doctor right away if you experience any of these symptoms or return to the emergency room.    Diagnosis: Essential hypertension  Assessment and Plan of Treatment: Your blood pressure medications were discontinued. Continue to monitor your blood pressure as out patient and follow up with your primary care provider for possible resumption of medications as an out patient.    Diagnosis: Hyponatremia  Assessment and Plan of Treatment: Continue fluid restricted diet, 1500mL of fluid per day. Your sodium level was 135 on 7/23.    Diagnosis: Colorectal cancer  Assessment and Plan of Treatment: [place] patient [place] [facility] [place]

## 2021-07-22 NOTE — PROGRESS NOTE ADULT - ASSESSMENT
# SEPSIS, ALTERED MENTATION  # SPONTANEOUS BACTERIAL PERITONITIS    ·	Presented with sepsis secondary to SBP  ·	Paracentesis consistent with SBP  ·	On zosyn; ID following, appreciate recs  ·	AMS resolve, pt aox3   ·	CT abd report complete -- will need to compare to previous baseline at [facility]  ·	S/p US of abdomen by IR 7/20, minimal amt of fluid, therapeutic paracentesis was not recommended      # METASTATIC COLON CANCER  # LIVER METASTASES  # RECURRENT ASCITES  # DVT 2/2021, ON XARELTO OUTPATIENT    ·	Ascites could be malignant; ascitic fliud non diagnostic   ·	On outpatient chemotherapy, hold while inpatient  ·	Will update [facility]  ·	US yest with only small amt of ascitic fluild  ·	MRI liver with mets without any necrosis        # LEUKOCYTOSIS  # ANEMIA    ·	Likely be secondary to infection given marked improvement in leukocytosis with abx- wbc improving today  ·	Also had nuelasta on 7/14   ·	Microcytic anemia likely secondary to chemo   ·	B12, folate normal; iron studies with ACID  ·	Cont to monitor   ·	Transfuse to keep hg >7   ·	Guiac +   ·	GI consult     We are in touch with [facility] [place] and they have asked us to follow along as consultants. Please call with any questions.      Homero Araujo MD  Hematology/Oncology Consultant  [facility]  Cell: [phone]

## 2021-07-22 NOTE — PROGRESS NOTE ADULT - SUBJECTIVE AND OBJECTIVE BOX
Follow Up:  fever    Interval History: pt afebrile and WBC still fluctuating today 19    ROS:      All other systems negative    Constitutional: no fever, no chills  Cardiovascular:  no chest pain, no palpitation  Respiratory:  no SOB, no cough  GI:  + abd discomfort and distention, no vomiting, no diarrhea  urinary: no dysuria, no hematuria, no flank pain  musculoskeletal:  no joint pain, no joint swelling  skin:  no rash  neurology:  no headache, no seizure      Allergies  No Known Allergies        ANTIMICROBIALS:      OTHER MEDS:  acetaminophen   Tablet .. 650 milliGRAM(s) Oral every 6 hours PRN  aluminum hydroxide/magnesium hydroxide/simethicone Suspension 30 milliLiter(s) Oral every 4 hours PRN  chlorhexidine 2% Cloths 1 Application(s) Topical <User Schedule>  furosemide    Tablet 40 milliGRAM(s) Oral daily  lidocaine 1% Injectable 10 milliLiter(s) Local Injection once  melatonin 3 milliGRAM(s) Oral at bedtime PRN  pantoprazole  Injectable 40 milliGRAM(s) IV Push two times a day  spironolactone 100 milliGRAM(s) Oral daily      Vital Signs Last 24 Hrs  T(C): 36.4 (22 Jul 2021 14:40), Max: 37.1 (21 Jul 2021 22:10)  T(F): 97.5 (22 Jul 2021 14:40), Max: 98.7 (21 Jul 2021 22:10)  HR: 62 (22 Jul 2021 14:40) (62 - 89)  BP: 106/70 (22 Jul 2021 14:40) (106/70 - 130/82)  BP(mean): --  RR: 17 (22 Jul 2021 14:40) (16 - 18)  SpO2: 100% (22 Jul 2021 14:40) (100% - 100%)    Physical Exam:  General:    NAD,  non toxic  Cardio:     regular S1, S2,  no murmur  Respiratory:    clear b/l,    no wheezing  abd:   distended with ascites but  soft,   BS +,   no tenderness  :   no CVAT,  no suprapubic tenderness,   no  shetty  Musculoskeletal:   no joint swelling  vascular: no phlebitis, R chest port  Skin:    no rash                          7.0    19.19 )-----------( 179      ( 22 Jul 2021 07:33 )             23.1       07-22    133<L>  |  100  |  9   ----------------------------<  91  4.4   |  21<L>  |  1.00    Ca    8.5      22 Jul 2021 07:33  Phos  2.9     07-22  Mg     2.00     07-22    TPro  5.3<L>  /  Alb  2.6<L>  /  TBili  0.7  /  DBili  x   /  AST  18  /  ALT  6   /  AlkPhos  225<H>  07-22          MICROBIOLOGY:  v  .Body Fluid Peritoneal Fluid  07-20-21   No growth  --    polymorphonuclear leukocytes seen  No organisms seen  by cytocentrifuge      .Body Fluid Peritoneal Fluid  07-15-21   Testing in progress  --  --      .Body Fluid Peritoneal Fluid  07-14-21   No growth  --    polymorphonuclear leukocytes seen  No organisms seen  by cytocentrifuge      .Blood Port Device  07-14-21   No Growth Final  --  --      .Blood Blood-Peripheral  07-14-21   No Growth Final  --  --                RADIOLOGY:  Images independently visualized and reviewed personally, findings as below  < from: MR Abdomen w/wo IV Cont (07.20.21 @ 21:30) >    IMPRESSION:  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    No evidence of susan necrosis in the liver, as clinically questioned.    < end of copied text >

## 2021-07-22 NOTE — DISCHARGE NOTE PROVIDER - NSDCMRMEDTOKEN_GEN_ALL_CORE_FT
acetaminophen 500 mg oral tablet: 2 tab(s) orally every 6 hours, As Needed Pain  amLODIPine 10 mg oral tablet: 1 tab(s) orally once a day  docusate sodium 100 mg oral capsule: 2 cap(s) orally once a day (at bedtime)  losartan-hydrochlorothiazide 100 mg-25 mg oral tablet: 1 tab(s) orally once a day  Xarelto 20 mg oral tablet: 1 tab(s) orally once a day (in the evening)   acetaminophen 325 mg oral tablet: 2 tab(s) orally every 6 hours, As needed, Temp greater or equal to 38.5C (101.3F), Mild Pain (1 - 3)  ciprofloxacin 500 mg oral tablet: 1 tab(s) orally once a day   docusate sodium 100 mg oral capsule: 2 cap(s) orally once a day (at bedtime)  furosemide 40 mg oral tablet: 1 tab(s) orally once a day  melatonin 3 mg oral tablet: 1 tab(s) orally once a day (at bedtime), As needed, Insomnia  Protonix 40 mg oral delayed release tablet: 1 tab(s) orally once a day   spironolactone 50 mg oral tablet: 2 tab(s) orally once a day   Xarelto 20 mg oral tablet: 1 tab(s) orally once a day (in the evening), RESUME ON 7/24/2021

## 2021-07-22 NOTE — PROGRESS NOTE ADULT - SUBJECTIVE AND OBJECTIVE BOX
Patient is a 66y old  Male who presents with a chief complaint of Fever and confusion (21 Jul 2021 14:39)      MEDICATIONS  (STANDING):  chlorhexidine 2% Cloths 1 Application(s) Topical <User Schedule>  furosemide    Tablet 40 milliGRAM(s) Oral daily  lidocaine 1% Injectable 10 milliLiter(s) Local Injection once  pantoprazole  Injectable 40 milliGRAM(s) IV Push two times a day  spironolactone 100 milliGRAM(s) Oral daily    MEDICATIONS  (PRN):  acetaminophen   Tablet .. 650 milliGRAM(s) Oral every 6 hours PRN Temp greater or equal to 38.5C (101.3F), Mild Pain (1 - 3)  aluminum hydroxide/magnesium hydroxide/simethicone Suspension 30 milliLiter(s) Oral every 4 hours PRN Dyspepsia  melatonin 3 milliGRAM(s) Oral at bedtime PRN Insomnia      Interim History:  No acute events over night. Vitals stable. Patient in no acute distress      Vital Signs Last 24 Hrs  T(C): 36.6 (22 Jul 2021 05:34), Max: 37.1 (21 Jul 2021 22:10)  T(F): 97.8 (22 Jul 2021 05:34), Max: 98.7 (21 Jul 2021 22:10)  HR: 65 (22 Jul 2021 05:34) (63 - 89)  BP: 117/78 (22 Jul 2021 05:34) (106/70 - 139/81)  BP(mean): --  RR: 18 (22 Jul 2021 05:34) (16 - 18)  SpO2: 100% (22 Jul 2021 05:34) (100% - 100%)    PE  NAD  Awake, alert  Anicteric, MMM  RRR  CTAB  Abd soft, NT, ND  No c/c/e  No rash grossly                            7.0    19.19 )-----------( 179      ( 22 Jul 2021 07:33 )             23.1       07-22    133<L>  |  100  |  9   ----------------------------<  91  4.4   |  21<L>  |  1.00    Ca    8.5      22 Jul 2021 07:33  Phos  2.9     07-22  Mg     2.00     07-22    TPro  5.3<L>  /  Alb  2.6<L>  /  TBili  0.7  /  DBili  x   /  AST  18  /  ALT  6   /  AlkPhos  225<H>  07-22

## 2021-07-22 NOTE — DISCHARGE NOTE PROVIDER - HOSPITAL COURSE
Mr. Cohn is a 66YOM with active colorectal cancer (mets to liver first dx'd Dec 2020, started chemo in Feb 2021) and DVTs on Xarelto presented to the ED for fever and altered mental status, now with ascitic fluid consistent with SBP. Admitted for sepsis 2/2 SBP.    Hospital course    Sepsis without acute organ dysfunction, due to unspecified organism.   -S/p vanco/zosyn. ID consulted, continued with Zosyn           - CT abd.Pelv noted   - surg eval appreciated   - hepatology eval appreciated   - plan for paracentesis , hold lovenox , unable to obtain, call IR for repeat paracentesis   - MR noted, moderate ascites   - GI eval for further eval in vie wof black stool and drop in hgb level.      Problem/Plan - 2:  ·  Problem: SBP (spontaneous bacterial peritonitis).  Plan: Manage as above  -Blood and ascitic Culture.      Problem/Plan - 3:  ·  Problem: Hyponatremia.  Plan: Decreased with IVF in ED. Most likely SIADH vs hypervolemia from liver cirrhosis/disease  -Fluid restrict for now unless patient becomes hypotensive  -Urine Na/osm.      Problem/Plan - 4:  ·  Problem: Chronic deep vein thrombosis (DVT) of lower extremity, unspecified laterality, unspecified vein.  Plan: Chronic since Feb 2021 on Xarelto. Last dose was 7/13  -Bridge on lovenox bid while inpatient.      Problem/Plan - 5:  ·  Problem: Colorectal cancer.  Plan: Has mets to liver with active chemo sessions  -May need to reach out to outpatient oncology in AM for more info.      Problem/Plan - 6:  Problem: Essential hypertension. Plan: Hold anti-HTN for now insetting of sepsis. Hold Losartan until after resolution of SBP.     Problem/Plan - 7:  ·  Problem: Need for prophylactic measure.  Plan: FENP: No IVF, lytes BID, fluid restriction 1.5L, lovenox bid.    Mr. Cohn is a 66YOM with active colorectal cancer (mets to liver first dx'd Dec 2020, started chemo in Feb 2021) and DVTs on Xarelto presented to the ED for fever and altered mental status, now with ascitic fluid consistent with SBP. Admitted for sepsis 2/2 SBP.    Hospital course    Sepsis 2/2 SBP  -S/p vanco/zosyn. ID consulted, continued with Zosyn   -CT abd pelvis: Colorectal cancer of the ascending colon with extensive metastatic disease to the liver. Large volume of ascites with nodular thickening along the right parietal peritoneum which may be secondary to peritoneal carcinomatosis vs peritonitis   -GI/hepatology consulted   - S/p paracentesis 600ml removed on 7/14 --> cyto non diagnostic   -CT abd and pelvis   -MRI abd: + hepatic mets, no evidence of liver necrosis, + large ascites   -IR consulted for paracentesis : CT abdomen/pelvis reviewed with mild to moderate ascites  -Surgery consulted: no acute intervention   -S/p repeat diagnostic para on 7/20   -IR consulted for theraputic para, deferred to procedure team --> Procedure team reconsulted given large volume ascites on MRI  -Pending theraputic paracentesis on 7/23 with procedure team -----    Hyponatremia.    -Decreased with IVF in ED. Most likely SIADH  -Fluid restricted  -Improved prior to discharge     Chronic deep vein thrombosis (DVT) of lower extremity  -Chronic since Feb 2021 on Xarelto. Last dose was 7/13  -Xarelto temporarily held in house for paracentesis  -Resumed Xarelto for discharge     Colorectal cancer.    -Has mets to liver with active chemo sessions --> known mets again seen on imaging in house   -Oncology consulted   -Out patient follow up     Essential hypertension.   -Held anti-HTN insetting of sepsi  -Held Losartan until after resolution of SBP.    Pt medically stable for discharge home as per discussion with Dr. Villa on ______.  Dispo: home    Mr. Cohn is a 66YOM with active colorectal cancer (mets to liver first dx'd Dec 2020, started chemo in Feb 2021) and DVTs on Xarelto presented to the ED for fever and altered mental status, now with ascitic fluid consistent with SBP. Admitted for sepsis 2/2 SBP.    Hospital course    Sepsis 2/2 SBP  -S/p vanco/zosyn. ID consulted, continued with Zosyn   -CT abd pelvis: Colorectal cancer of the ascending colon with extensive metastatic disease to the liver. Large volume of ascites with nodular thickening along the right parietal peritoneum which may be secondary to peritoneal carcinomatosis vs peritonitis   -GI/hepatology consulted   - S/p paracentesis 600ml removed on 7/14 --> cyto non diagnostic   -CT abd and pelvis   -MRI abd: + hepatic mets, no evidence of liver necrosis, + large ascites   -IR consulted for paracentesis : CT abdomen/pelvis reviewed with mild to moderate ascites  -Surgery consulted: no acute intervention   -S/p repeat diagnostic para on 7/20--> cytology neg for malignant cells   -IR consulted for theraputic para, deferred to procedure team --> Procedure team reconsulted given large volume ascites on MRI  -Pending therapeutic paracentesis on 7/23 with procedure team, 3.8L drained     Hyponatremia.    -Decreased with IVF in ED. Most likely SIADH  -Fluid restricted  -Improved prior to discharge     Chronic deep vein thrombosis (DVT) of lower extremity  -Chronic since Feb 2021 on Xarelto. Last dose was 7/13  -Xarelto temporarily held in house for paracentesis  -Resumed Xarelto for discharge-- ok to resume on 7/24 per discussion with procedure team and Dr. Villa.     Colorectal cancer.    -Has mets to liver with active chemo sessions --> known mets again seen on imaging in house   -Oncology consulted   -Out patient follow up     Essential hypertension.   -Held anti-HTN insetting of sepsi  -Held Losartan until after resolution of SBP.    Pt medically stable for discharge home as per discussion with Dr. Villa on 7/23/2021.  Dispo: home

## 2021-07-22 NOTE — PROGRESS NOTE ADULT - SUBJECTIVE AND OBJECTIVE BOX
Name of Patient : JAELYN CLARKE  MRN: [id number]  DATE OF SERVICE: 07-22-21     Subjective: Patient seen and examined. No new events except as noted.   doing oky  1 unit PRBC     REVIEW OF SYSTEMS:    CONSTITUTIONAL: No weakness, fevers or chills  EYES/ENT: No visual changes;  No vertigo or throat pain   NECK: No pain or stiffness  RESPIRATORY: No cough, wheezing, hemoptysis; No shortness of breath  CARDIOVASCULAR: No chest pain or palpitations  GASTROINTESTINAL: No abdominal or epigastric pain. No nausea, vomiting, or hematemesis; No diarrhea or constipation. No melena or hematochezia.  GENITOURINARY: No dysuria, frequency or hematuria  NEUROLOGICAL: No numbness or weakness  SKIN: No itching, burning, rashes, or lesions   All other review of systems is negative unless indicated above.    MEDICATIONS:  MEDICATIONS  (STANDING):  chlorhexidine 2% Cloths 1 Application(s) Topical <User Schedule>  furosemide    Tablet 40 milliGRAM(s) Oral daily  lidocaine 1% Injectable 10 milliLiter(s) Local Injection once  pantoprazole  Injectable 40 milliGRAM(s) IV Push two times a day  piperacillin/tazobactam IVPB.. 3.375 Gram(s) IV Intermittent every 8 hours  spironolactone 100 milliGRAM(s) Oral daily      PHYSICAL EXAM:  T(C): 36.6 (07-22-21 @ 17:51), Max: 37.1 (07-21-21 @ 22:10)  HR: 68 (07-22-21 @ 17:51) (62 - 70)  BP: 111/73 (07-22-21 @ 17:51) (106/70 - 130/82)  RR: 17 (07-22-21 @ 17:51) (16 - 18)  SpO2: 100% (07-22-21 @ 17:51) (100% - 100%)  Wt(kg): --  I&O's Summary    21 Jul 2021 07:01  -  22 Jul 2021 07:00  --------------------------------------------------------  IN: 1000 mL / OUT: 1200 mL / NET: -200 mL    22 Jul 2021 07:01  -  22 Jul 2021 18:37  --------------------------------------------------------  IN: 900 mL / OUT: 1200 mL / NET: -300 mL          Appearance: Normal	  HEENT:  PERRLA   Lymphatic: No lymphadenopathy   Cardiovascular: Normal S1 S2, no JVD  Respiratory: normal effort , clear  Gastrointestinal:  Soft, distedned   Skin: No rashes,  warm to touch  Psychiatry:  Mood & affect appropriate  Musculuskeletal: No edema      All labs, Imaging and EKGs personally reviewed     07-21-21 @ 07:01  -  07-22-21 @ 07:00  --------------------------------------------------------  IN: 1000 mL / OUT: 1200 mL / NET: -200 mL    07-22-21 @ 07:01  -  07-22-21 @ 18:37  --------------------------------------------------------  IN: 900 mL / OUT: 1200 mL / NET: -300 mL                            7.0    19.19 )-----------( 179      ( 22 Jul 2021 07:33 )             23.1               07-22    133<L>  |  100  |  9   ----------------------------<  91  4.4   |  21<L>  |  1.00    Ca    8.5      22 Jul 2021 07:33  Phos  2.9     07-22  Mg     2.00     07-22    TPro  5.3<L>  /  Alb  2.6<L>  /  TBili  0.7  /  DBili  x   /  AST  18  /  ALT  6   /  AlkPhos  225<H>  07-22    PT/INR - ( 22 Jul 2021 07:33 )   PT: 14.2 sec;   INR: 1.25 ratio

## 2021-07-22 NOTE — DISCHARGE NOTE PROVIDER - CARE PROVIDER_API CALL
Florencio Espana  Phone: [phone]  Fax: (   )    -  Follow Up Time:     [facility],   Phone: (   )    -  Fax: (   )    -  Follow Up Time:    Florencio Espana  Phone: [phone]  Fax: (   )    -  Follow Up Time:     [facility],   Phone: (   )    -  Fax: (   )    -  Follow Up Time: 1 week    Alo Pagan [address] [place] [address]  Phone: [phone]  Fax: [phone]  Follow Up Time: 1 week

## 2021-07-22 NOTE — DISCHARGE NOTE PROVIDER - NSDCFUADDAPPT_GEN_ALL_CORE_FT
Please follow up with your primary care provider within 1 week of discharge from hospital. If you are in need of a general medicine physician and post-discharge medical follow-up for further care/recommendations you may contact the [facility] for an appointment [phone].

## 2021-07-22 NOTE — PROGRESS NOTE ADULT - ASSESSMENT
66 m with active colorectal cancer (mets to liver first dx'd Dec 2020, started chemo in Feb 2021), DVTs on Xarelto, known ascites with no cirrhosis, last tap 7/7 now p/w fever and AMS after the last chemo (4 days ago), has abd discomfort but no cough, diarrhea, dysuria  here afebrile, WBC: 46  s/p paracentesis, WBC:652, PMN 45%    fever, tachycardia, AMS, leukocytosis  in the setting of metastatic colon ca, ascites, s/p tap which is s/o peritonitis  CT showed   Colorectal cancer of the ascending colon with extensive metastatic disease to the liver.   Large volume of ascites with nodular thickening along the right parietal peritoneum which may be secondary to peritoneal carcinomatosis vs peritonitis     * c/w zosyn, started 7/15   * repeat tap 7/20 showed 557 WBC and 28% PMN so improved from SBP point of view, plan for another therapeutic tap tomorrow  * on discharge switch to PO cipro 500 qd for SBP ppx  * monitor the WBC/diff and temp curve    The above assessment and plan was discussed with the primary team    Grace Perez MD  Pager [phone]  After 5pm and on weekends call [phone]

## 2021-07-22 NOTE — PROVIDER CONTACT NOTE (CRITICAL VALUE NOTIFICATION) - BACKGROUND
pt admitted for fever of unknown origin
pt admitted for fever of unknown origin
Colorectal Ca, positive occult
Patient admitted with cholecystitis.

## 2021-07-22 NOTE — CHART NOTE - NSCHARTNOTEFT_GEN_A_CORE
Briefly, 66M active colorectal cancer (mets to liver first dx'd Dec 2020, started chemo in Feb 2021) and DVTs on Xarelto presented to the ED for fever x5d and altered mental status 2/2 to peritonitis with remarkable improvement with antibiotic.    Work up remarkable for SAAG > 1.1 most likely due to nodular liver surface from extensive metastatic liver disease. Active concern for peritoneal carcinomatosis by outside oncologist, which could be contributing to patient ascites formation. PV thrombus was rule out. Chronic liver disease work up was negative for Hep B, Hep C, ETOH abuse, auto-immune hepatitis, PBC, and PSC, thus, supporting extensive metastasis liver metastasis as main culprit for liver damage. Agree with peritonitis prophylaxis recommended by ID. Would continue current diuretic regimen (lasix 40 mg and spironolactone 100 mg daily), encourage Low salt diet (< 2 g Na) and perform therapeutic paracentesis PRN for patient comfort  for ascites management.     Regarding his anemia, most likely in setting of anemia of chronic disease and in setting of infection. Iron panel not concerning for MICKEY. BUN < 20 and brown stool, making active bleeding less likely. Guaic + in setting of malignancy in colon, which would be expected. Hgb stable since admission and at baseline (8, clarified with outside Oncology team) Patient can follow up Dr. Alo Pagan, (GI who perform his colonoscopy) and determine the next step after peritonitis management.    No further inpatient work up per hepatology team, please reach out to us for any question or concern.    Hima Lang  Gastroenterology/Hepatology Fellow  Available via Microsoft Teams  Pager: [phone]    NON-URGENT CONSULTS:  Please email [email or web address]  AT NIGHT AND ON WEEKENDS:  Contact on-call GI fellow via answering service ([phone]) from 5pm-8am and on weekends/holidays

## 2021-07-23 VITALS
HEART RATE: 67 BPM | OXYGEN SATURATION: 100 % | DIASTOLIC BLOOD PRESSURE: 78 MMHG | RESPIRATION RATE: 17 BRPM | SYSTOLIC BLOOD PRESSURE: 114 MMHG | TEMPERATURE: 98 F

## 2021-07-23 LAB
ALBUMIN SERPL ELPH-MCNC: 2.6 G/DL — LOW (ref 3.3–5)
ALP SERPL-CCNC: 223 U/L — HIGH (ref 40–120)
ALT FLD-CCNC: 8 U/L — SIGNIFICANT CHANGE UP (ref 4–41)
ANION GAP SERPL CALC-SCNC: 13 MMOL/L — SIGNIFICANT CHANGE UP (ref 7–14)
AST SERPL-CCNC: 19 U/L — SIGNIFICANT CHANGE UP (ref 4–40)
BASOPHILS # BLD AUTO: 0.09 K/UL — SIGNIFICANT CHANGE UP (ref 0–0.2)
BASOPHILS NFR BLD AUTO: 0.5 % — SIGNIFICANT CHANGE UP (ref 0–2)
BILIRUB SERPL-MCNC: 1 MG/DL — SIGNIFICANT CHANGE UP (ref 0.2–1.2)
BUN SERPL-MCNC: 11 MG/DL — SIGNIFICANT CHANGE UP (ref 7–23)
CALCIUM SERPL-MCNC: 8.8 MG/DL — SIGNIFICANT CHANGE UP (ref 8.4–10.5)
CHLORIDE SERPL-SCNC: 101 MMOL/L — SIGNIFICANT CHANGE UP (ref 98–107)
CO2 SERPL-SCNC: 21 MMOL/L — LOW (ref 22–31)
CREAT SERPL-MCNC: 1.02 MG/DL — SIGNIFICANT CHANGE UP (ref 0.5–1.3)
EOSINOPHIL # BLD AUTO: 0.05 K/UL — SIGNIFICANT CHANGE UP (ref 0–0.5)
EOSINOPHIL NFR BLD AUTO: 0.3 % — SIGNIFICANT CHANGE UP (ref 0–6)
GLUCOSE SERPL-MCNC: 82 MG/DL — SIGNIFICANT CHANGE UP (ref 70–99)
HCT VFR BLD CALC: 28.5 % — LOW (ref 39–50)
HEV IGM SER QL: SIGNIFICANT CHANGE UP
HGB BLD-MCNC: 8.8 G/DL — LOW (ref 13–17)
IANC: 12.14 K/UL — HIGH (ref 1.5–8.5)
IMM GRANULOCYTES NFR BLD AUTO: 8.7 % — HIGH (ref 0–1.5)
LYMPHOCYTES # BLD AUTO: 1.17 K/UL — SIGNIFICANT CHANGE UP (ref 1–3.3)
LYMPHOCYTES # BLD AUTO: 6.9 % — LOW (ref 13–44)
MCHC RBC-ENTMCNC: 22.6 PG — LOW (ref 27–34)
MCHC RBC-ENTMCNC: 30.9 GM/DL — LOW (ref 32–36)
MCV RBC AUTO: 73.1 FL — LOW (ref 80–100)
MONOCYTES # BLD AUTO: 2.02 K/UL — HIGH (ref 0–0.9)
MONOCYTES NFR BLD AUTO: 11.9 % — SIGNIFICANT CHANGE UP (ref 2–14)
NEUTROPHILS # BLD AUTO: 12.14 K/UL — HIGH (ref 1.8–7.4)
NEUTROPHILS NFR BLD AUTO: 71.7 % — SIGNIFICANT CHANGE UP (ref 43–77)
NRBC # BLD: 0 /100 WBCS — SIGNIFICANT CHANGE UP
NRBC # FLD: 0.03 K/UL — HIGH
PLATELET # BLD AUTO: 150 K/UL — SIGNIFICANT CHANGE UP (ref 150–400)
POTASSIUM SERPL-MCNC: 4.5 MMOL/L — SIGNIFICANT CHANGE UP (ref 3.5–5.3)
POTASSIUM SERPL-SCNC: 4.5 MMOL/L — SIGNIFICANT CHANGE UP (ref 3.5–5.3)
PROT SERPL-MCNC: 5.5 G/DL — LOW (ref 6–8.3)
RBC # BLD: 3.9 M/UL — LOW (ref 4.2–5.8)
RBC # FLD: 26.7 % — HIGH (ref 10.3–14.5)
SODIUM SERPL-SCNC: 135 MMOL/L — SIGNIFICANT CHANGE UP (ref 135–145)
WBC # BLD: 16.94 K/UL — HIGH (ref 3.8–10.5)
WBC # FLD AUTO: 16.94 K/UL — HIGH (ref 3.8–10.5)

## 2021-07-23 PROCEDURE — 49082 ABD PARACENTESIS: CPT

## 2021-07-23 RX ORDER — PANTOPRAZOLE SODIUM 20 MG/1
1 TABLET, DELAYED RELEASE ORAL
Qty: 30 | Refills: 0
Start: 2021-07-23 | End: 2021-08-21

## 2021-07-23 RX ORDER — RIVAROXABAN 15 MG-20MG
1 KIT ORAL
Qty: 0 | Refills: 0 | DISCHARGE

## 2021-07-23 RX ORDER — SPIRONOLACTONE 25 MG/1
4 TABLET, FILM COATED ORAL
Qty: 120 | Refills: 0
Start: 2021-07-23 | End: 2021-08-21

## 2021-07-23 RX ORDER — SPIRONOLACTONE 25 MG/1
2 TABLET, FILM COATED ORAL
Qty: 60 | Refills: 0
Start: 2021-07-23 | End: 2021-08-21

## 2021-07-23 RX ORDER — LOSARTAN/HYDROCHLOROTHIAZIDE 100MG-25MG
1 TABLET ORAL
Qty: 0 | Refills: 0 | DISCHARGE

## 2021-07-23 RX ORDER — ACETAMINOPHEN 500 MG
2 TABLET ORAL
Qty: 0 | Refills: 0 | DISCHARGE

## 2021-07-23 RX ORDER — CIPROFLOXACIN LACTATE 400MG/40ML
1 VIAL (ML) INTRAVENOUS
Qty: 30 | Refills: 0
Start: 2021-07-23 | End: 2021-08-21

## 2021-07-23 RX ORDER — ACETAMINOPHEN 500 MG
2 TABLET ORAL
Qty: 0 | Refills: 0 | DISCHARGE
Start: 2021-07-23

## 2021-07-23 RX ORDER — FUROSEMIDE 40 MG
1 TABLET ORAL
Qty: 30 | Refills: 0
Start: 2021-07-23 | End: 2021-08-21

## 2021-07-23 RX ORDER — AMLODIPINE BESYLATE 2.5 MG/1
1 TABLET ORAL
Qty: 0 | Refills: 0 | DISCHARGE

## 2021-07-23 RX ORDER — LANOLIN ALCOHOL/MO/W.PET/CERES
1 CREAM (GRAM) TOPICAL
Qty: 0 | Refills: 0 | DISCHARGE
Start: 2021-07-23

## 2021-07-23 RX ADMIN — Medication 40 MILLIGRAM(S): at 05:17

## 2021-07-23 RX ADMIN — Medication 650 MILLIGRAM(S): at 00:02

## 2021-07-23 RX ADMIN — PIPERACILLIN AND TAZOBACTAM 25 GRAM(S): 4; .5 INJECTION, POWDER, LYOPHILIZED, FOR SOLUTION INTRAVENOUS at 05:18

## 2021-07-23 RX ADMIN — PANTOPRAZOLE SODIUM 40 MILLIGRAM(S): 20 TABLET, DELAYED RELEASE ORAL at 05:49

## 2021-07-23 RX ADMIN — SPIRONOLACTONE 100 MILLIGRAM(S): 25 TABLET, FILM COATED ORAL at 05:17

## 2021-07-23 NOTE — PROGRESS NOTE ADULT - ASSESSMENT
# SEPSIS, ALTERED MENTATION  # SPONTANEOUS BACTERIAL PERITONITIS    ·	Presented with sepsis secondary to SBP  ·	Paracentesis consistent with SBP  ·	On zosyn; ID following, appreciate recs  ·	AMS resolve, pt aox3   ·	CT abd report complete -- will need to compare to previous baseline at [facility]  ·	S/p US of abdomen by IR 7/20, minimal amt of fluid, therapeutic paracentesis was not recommended      # METASTATIC COLON CANCER  # LIVER METASTASES  # RECURRENT ASCITES  # DVT 2/2021, ON XARELTO OUTPATIENT    ·	Ascites could be malignant; ascitic fliud non diagnostic   ·	On outpatient chemotherapy, hold while inpatient  ·	Will update [facility]  ·	US yest with only small amt of ascitic fluild  ·	MRI liver with mets without any necrosis  ·	pt to go for IR guided paracentesis today         # LEUKOCYTOSIS  # ANEMIA    ·	Likely be secondary to infection given marked improvement in leukocytosis with abx- wbc improving today  ·	Also had nuelasta on 7/14   ·	Microcytic anemia likely secondary to chemo   ·	B12, folate normal; iron studies with ACID  ·	Cont to monitor   ·	Transfuse to keep hg >7   ·	Guiac +   ·	GI consult pending     We are in touch with [facility] and they have asked us to follow along as consultants. Please call with any questions.    Reid Augustin MD  [facility]  [phone]

## 2021-07-23 NOTE — PROGRESS NOTE ADULT - PROBLEM SELECTOR PLAN 2
Manage as above  -Blood and ascitic Culture pending
Manage as above  -Blood and ascitic Culture

## 2021-07-23 NOTE — PROGRESS NOTE ADULT - PROBLEM SELECTOR PROBLEM 2
SBP (spontaneous bacterial peritonitis)

## 2021-07-23 NOTE — PROGRESS NOTE ADULT - PROBLEM SELECTOR PLAN 7
FENP: No IVF, lytes BID, fluid restriction 1.5L, lovenox bid

## 2021-07-23 NOTE — CHART NOTE - NSCHARTNOTESELECT_GEN_ALL_CORE
ACP/Event Note
Event Note
IR Resident/Off Service Note
ACP/Event Note
Event Note

## 2021-07-23 NOTE — PROGRESS NOTE ADULT - NUTRITIONAL ASSESSMENT
This patient has been assessed with a concern for Malnutrition and has been determined to have a diagnosis/diagnoses of Severe protein-calorie malnutrition.    This patient is being managed with:   Diet Regular-  1500mL Fluid Restriction ([id number])  Supplement Feeding Modality:  Oral  Ensure Enlive Cans or Servings Per Day:  2       Frequency:  Daily  Entered: Jul 19 2021  3:57PM    Diet Regular-  1500mL Fluid Restriction ([id number])  Entered: Jul 15 2021  3:29AM    The following pending diet order is being considered for treatment of Severe protein-calorie malnutrition:null
This patient has been assessed with a concern for Malnutrition and has been determined to have a diagnosis/diagnoses of Severe protein-calorie malnutrition.    This patient is being managed with:   Diet Regular-  1500mL Fluid Restriction ([id number])  Supplement Feeding Modality:  Oral  Ensure Enlive Cans or Servings Per Day:  2       Frequency:  Daily  Entered: Jul 19 2021  3:57PM    Diet Regular-  1500mL Fluid Restriction ([id number])  Entered: Jul 15 2021  3:29AM    The following pending diet order is being considered for treatment of Severe protein-calorie malnutrition:null
This patient has been assessed with a concern for Malnutrition and has been determined to have a diagnosis/diagnoses of Severe protein-calorie malnutrition.    This patient is being managed with:   Diet Regular-  1500mL Fluid Restriction ([id number])  Supplement Feeding Modality:  Oral  Ensure Enlive Cans or Servings Per Day:  2       Frequency:  Daily  Entered: Jul 19 2021  3:57PM    Diet Regular-  1500mL Fluid Restriction ([id number])  Entered: Jul 15 2021  3:29AM    The following pending diet order is being considered for treatment of Severe protein-calorie malnutrition:null
This patient has been assessed with a concern for Malnutrition and has been determined to have a diagnosis/diagnoses of Severe protein-calorie malnutrition.    This patient is being managed with:   Diet Regular-  1500mL Fluid Restriction ([id number])  Supplement Feeding Modality:  Oral  Ensure Enlive Cans or Servings Per Day:  2       Frequency:  Daily  Entered: Jul 19 2021  3:57PM    Diet Regular-  1500mL Fluid Restriction ([id number])  Entered: Jul 15 2021  3:29AM    The following pending diet order is being considered for treatment of Severe protein-calorie malnutrition:null
This patient has been assessed with a concern for Malnutrition and has been determined to have a diagnosis/diagnoses of Severe protein-calorie malnutrition.    This patient is being managed with:   Diet Regular-  1500mL Fluid Restriction ([id number])  Supplement Feeding Modality:  Oral  Ensure Enlive Cans or Servings Per Day:  2       Frequency:  Daily  Entered: Jul 19 2021  3:57PM    Diet Regular-  1500mL Fluid Restriction ([id number])  Entered: Jul 15 2021  3:29AM    The following pending diet order is being considered for treatment of Severe protein-calorie malnutrition:null
This patient has been assessed with a concern for Malnutrition and has been determined to have a diagnosis/diagnoses of Severe protein-calorie malnutrition.    This patient is being managed with:   Diet Regular-  1500mL Fluid Restriction ([id number])  Supplement Feeding Modality:  Oral  Ensure Enlive Cans or Servings Per Day:  2       Frequency:  Daily  Entered: Jul 19 2021  3:57PM    Diet Regular-  1500mL Fluid Restriction ([id number])  Entered: Jul 15 2021  3:29AM    The following pending diet order is being considered for treatment of Severe protein-calorie malnutrition:null

## 2021-07-23 NOTE — PROGRESS NOTE ADULT - PROBLEM SELECTOR PROBLEM 1
Sepsis without acute organ dysfunction, due to unspecified organism

## 2021-07-23 NOTE — PROGRESS NOTE ADULT - PROBLEM SELECTOR PROBLEM 4
Chronic deep vein thrombosis (DVT) of lower extremity, unspecified laterality, unspecified vein

## 2021-07-23 NOTE — PROGRESS NOTE ADULT - PROVIDER SPECIALTY LIST ADULT
Heme/Onc
Hepatology
Internal Medicine
Heme/Onc
Hepatology
Infectious Disease
Internal Medicine
Heme/Onc
Hepatology
Infectious Disease
Heme/Onc
Heme/Onc
Hepatology
Hepatology
Infectious Disease
Heme/Onc
Internal Medicine

## 2021-07-23 NOTE — DISCHARGE NOTE NURSING/CASE MANAGEMENT/SOCIAL WORK - NSDCFUADDAPPT_GEN_ALL_CORE_FT
Please follow up with your primary care provider within 1 week of discharge from hospital. If you are in need of a general medicine physician and post-discharge medical follow-up for further care/recommendations you may contact the [facility] for an appointment [phone].

## 2021-07-23 NOTE — PROGRESS NOTE ADULT - SUBJECTIVE AND OBJECTIVE BOX
Name of Patient : JAELYN CLARKE  MRN: [id number]  DATE OF SERVICE: 07-23-21       Subjective: Patient seen and examined. No new events except as noted.   doing okay  S/P paracentesis     REVIEW OF SYSTEMS:    CONSTITUTIONAL: No weakness, fevers or chills  EYES/ENT: No visual changes;  No vertigo or throat pain   NECK: No pain or stiffness  RESPIRATORY: No cough, wheezing, hemoptysis; No shortness of breath  CARDIOVASCULAR: No chest pain or palpitations  GASTROINTESTINAL: No abdominal or epigastric pain.   GENITOURINARY: No dysuria, frequency or hematuria  NEUROLOGICAL: No numbness or weakness  SKIN: No itching, burning, rashes, or lesions   All other review of systems is negative unless indicated above.    MEDICATIONS:  MEDICATIONS  (STANDING):  chlorhexidine 2% Cloths 1 Application(s) Topical <User Schedule>  furosemide    Tablet 40 milliGRAM(s) Oral daily  lidocaine 1% Injectable 10 milliLiter(s) Local Injection once  pantoprazole  Injectable 40 milliGRAM(s) IV Push two times a day  piperacillin/tazobactam IVPB.. 3.375 Gram(s) IV Intermittent every 8 hours  spironolactone 100 milliGRAM(s) Oral daily      PHYSICAL EXAM:  T(C): 36.6 (07-23-21 @ 13:35), Max: 36.9 (07-23-21 @ 02:00)  HR: 67 (07-23-21 @ 13:35) (57 - 71)  BP: 114/78 (07-23-21 @ 13:35) (114/78 - 121/78)  RR: 17 (07-23-21 @ 13:35) (17 - 18)  SpO2: 100% (07-23-21 @ 13:35) (100% - 100%)  Wt(kg): --  I&O's Summary    22 Jul 2021 07:01  -  23 Jul 2021 07:00  --------------------------------------------------------  IN: 1200 mL / OUT: 2300 mL / NET: -1100 mL    23 Jul 2021 07:01  -  23 Jul 2021 19:13  --------------------------------------------------------  IN: 400 mL / OUT: 4600 mL / NET: -4200 mL          Appearance: Normal	  HEENT:  PERRLA   Lymphatic: No lymphadenopathy   Cardiovascular: Normal S1 S2, no JVD  Respiratory: normal effort , clear  Gastrointestinal:  Soft, Non-tender  Skin: No rashes,  warm to touch  Psychiatry:  Mood & affect appropriate  Musculuskeletal: No edema      All labs, Imaging and EKGs personally reviewed     07-22-21 @ 07:01  -  07-23-21 @ 07:00  --------------------------------------------------------  IN: 1200 mL / OUT: 2300 mL / NET: -1100 mL    07-23-21 @ 07:01  -  07-23-21 @ 19:13  --------------------------------------------------------  IN: 400 mL / OUT: 4600 mL / NET: -4200 mL                            8.8    16.94 )-----------( 150      ( 23 Jul 2021 06:41 )             28.5               07-23    135  |  101  |  11  ----------------------------<  82  4.5   |  21<L>  |  1.02    Ca    8.8      23 Jul 2021 06:41  Phos  2.9     07-22  Mg     2.00     07-22    TPro  5.5<L>  /  Alb  2.6<L>  /  TBili  1.0  /  DBili  x   /  AST  19  /  ALT  8   /  AlkPhos  223<H>  07-23    PT/INR - ( 22 Jul 2021 07:33 )   PT: 14.2 sec;   INR: 1.25 ratio

## 2021-07-23 NOTE — PROGRESS NOTE ADULT - PROBLEM SELECTOR PLAN 6
Hold anti-HTN for now insetting of sepsis. Hold Losartan until after resolution of SBP

## 2021-07-23 NOTE — PROGRESS NOTE ADULT - REASON FOR ADMISSION
Fever and confusion

## 2021-07-23 NOTE — DISCHARGE NOTE NURSING/CASE MANAGEMENT/SOCIAL WORK - PATIENT PORTAL LINK FT
You can access the FollowMyHealth Patient Portal offered by [facility] by registering at the following website: [email or web address]. By joining [facility]’s FollowMyHealth portal, you will also be able to view your health information using other applications (apps) compatible with our system.

## 2021-07-23 NOTE — PROGRESS NOTE ADULT - PROBLEM SELECTOR PLAN 1
10/6/2017    Madyson Corrales  [address] [address]        Dear Madyson,    Thank you for choosing [facility], [facility] for your care.  During your clinic visit on 10/2/17, a Pap was performed.  I am happy to inform you the tests results have been reviewed and interpreted as normal.  There were no abnormalities reported and no evidence of cancer.      Regular pap smear evaluations are encouraged as part of your regular health maintainence screening evaluations. Please schedule an appointment in the future for your next exam in five years.    If you have any questions or concerns feel free to contact our office.  Your health is our primary concern and we want to thank you for choosing [facility] for your care.    Sincerely,      Kenna Best MD  [facility], [facility] [address]  [address]  [address] [address]  [phone]      
Tachy and wbc with SBP. Currently clinically compensated  -Started broad spectrum abx (Vanc/Zosyn covering MRSA, Pseudomonas and anerobics)  -May narrow after culture speciation/sensitivity  -Hold off albumin as no renal failure  -Monitor renal function and output closely  - ID eval appreicated  - CT abd.Pelv noted   - surg eval appreciated   - hepatology eval appreciated   - plan for paracentesis     leukocytosis likely due to recent Neupogen, monitor clinically
Tachy and wbc with SBP. Currently clinically compensated  -Started broad spectrum abx (Vanc/Zosyn covering MRSA, Pseudomonas and anerobics)  -May narrow after culture speciation/sensitivity  -Hold off albumin as no renal failure  -Monitor renal function and output closely  - ID eval appreicated  - CT abd.Pelv noted   - surg eval appreciated   - hepatology eval appreciated   - plan for paracentesis , hold lovenox , unable to obtain, call IR for repeat paracentesis   - GI eval for further eval in vie wof black stool and drop in hgb level     leukocytosis likely due to recent Neupogen, monitor clinically
Tachy and wbc with SBP. Currently clinically compensated  -Started broad spectrum abx (Vanc/Zosyn covering MRSA, Pseudomonas and anerobics)  -May narrow after culture speciation/sensitivity  -Hold off albumin as no renal failure  -Monitor renal function and output closely  - ID eval appreicated  - CT abd.Pelv noted   - surg eval appreciated   - hepatology eval appreciated   - plan for paracentesis , hold lovenox     leukocytosis likely due to recent Neupogen, monitor clinically
Tachy and wbc with SBP. Currently clinically compensated  -Started broad spectrum abx (Vanc/Zosyn covering MRSA, Pseudomonas and anerobics)  -May narrow after culture speciation/sensitivity  -Hold off albumin as no renal failure  -Monitor renal function and output closely  - ID eval appreicated  - CT abd.Pelv noted   - surg eval appreciated   - hepatology eval appreciated   - plan for paracentesis , hold lovenox , unable to obtain, S/P paracentesis over 3L removed   - MR noted, moderate ascites   - GI eval for further eval in vie wof black stool and drop in hgb level  - S/P 1 unit PRBC
Tachy and wbc with SBP. Currently clinically compensated  -Started broad spectrum abx (Vanc/Zosyn covering MRSA, Pseudomonas and anerobics)  -May narrow after culture speciation/sensitivity  -Hold off albumin as no renal failure  -Monitor renal function and output closely  - ID eval appreicated  - CT abd.Pelv noted   - surg eval appreciated   - hepatology eval appreciated   - plan for paracentesis , hold lovenox     leukocytosis likely due to recent Neupogen, monitor clinically
Tachy and wbc with SBP. Currently clinically compensated  -Started broad spectrum abx (Vanc/Zosyn covering MRSA, Pseudomonas and anerobics)  -May narrow after culture speciation/sensitivity  -Hold off albumin as no renal failure  -Monitor renal function and output closely  - ID eval appreicated  - CT abd.Pelv noted   - surg eval appreciated   - hepatology eval appreciated   - plan for paracentesis , hold lovenox , unable to obtain, call IR for repeat paracentesis   - MR noted, moderate ascites   - GI eval for further eval in vie wof black stool and drop in hgb level  1 unit PRBC
Tachy and wbc with SBP. Currently clinically compensated  -Started broad spectrum abx (Vanc/Zosyn covering MRSA, Pseudomonas and anerobics)  -May narrow after culture speciation/sensitivity  -Hold off albumin as no renal failure  -Monitor renal function and output closely  - ID eval appreicated  - CT abd.Pelv noted   - surg eval called  - hepatology eval called  - plan for paracentesis     leukocytosis likely due to recent Neupogen, monitor clinically
Tachy and wbc with SBP. Currently clinically compensated  -Started broad spectrum abx (Vanc/Zosyn covering MRSA, Pseudomonas and anerobics)  -May narrow after culture speciation/sensitivity  -Hold off albumin as no renal failure  -Monitor renal function and output closely  - ID eval appreicated  - CT abd.Pelv noted   - surg eval appreciated   - hepatology eval appreciated   - plan for paracentesis , hold lovenox , unable to obtain, call IR for repeat paracentesis   - MR noted, moderate ascites   - GI eval for further eval in vie wof black stool and drop in hgb level
Tachy and wbc with SBP. Currently clinically compensated  -Started broad spectrum abx (Vanc/Zosyn covering MRSA, Pseudomonas and anerobics)  -May narrow after culture speciation/sensitivity  -Hold off albumin as no renal failure  -Monitor renal function and output closely  - ID eval appreicated  - CT abd.Pelv     leukocytosis likely due to recent Neupogen

## 2021-07-23 NOTE — PROGRESS NOTE ADULT - SUBJECTIVE AND OBJECTIVE BOX
Patient is a 66y old  Male who presents with a chief complaint of Fever and confusion (21 Jul 2021 14:39)  pt comfortable; waiting for paracentesis today     MEDICATIONS  (STANDING):  chlorhexidine 2% Cloths 1 Application(s) Topical <User Schedule>  furosemide    Tablet 40 milliGRAM(s) Oral daily  lidocaine 1% Injectable 10 milliLiter(s) Local Injection once  pantoprazole  Injectable 40 milliGRAM(s) IV Push two times a day  piperacillin/tazobactam IVPB.. 3.375 Gram(s) IV Intermittent every 8 hours  spironolactone 100 milliGRAM(s) Oral daily    MEDICATIONS  (PRN):  acetaminophen   Tablet .. 650 milliGRAM(s) Oral every 6 hours PRN Temp greater or equal to 38.5C (101.3F), Mild Pain (1 - 3)  aluminum hydroxide/magnesium hydroxide/simethicone Suspension 30 milliLiter(s) Oral every 4 hours PRN Dyspepsia  melatonin 3 milliGRAM(s) Oral at bedtime PRN Insomnia    Vital Signs Last 24 Hrs  T(C): 36.3 (23 Jul 2021 05:40), Max: 37 (22 Jul 2021 14:20)  T(F): 97.3 (23 Jul 2021 05:40), Max: 98.6 (22 Jul 2021 14:20)  HR: 57 (23 Jul 2021 05:40) (57 - 71)  BP: 118/76 (23 Jul 2021 05:40) (106/70 - 130/82)  BP(mean): --  RR: 18 (23 Jul 2021 05:40) (17 - 18)  SpO2: 100% (23 Jul 2021 05:40) (100% - 100%)  PE  NAD  Awake, alert  Anicteric, MMM  RRR  CTAB  Abd soft, NT, ND  No c/c/e  No rash grossly                          8.8    16.94 )-----------( 150      ( 23 Jul 2021 06:41 )             28.5                           7.0    19.19 )-----------( 179      ( 22 Jul 2021 07:33 )             23.1       07-22    133<L>  |  100  |  9   ----------------------------<  91  4.4   |  21<L>  |  1.00    Ca    8.5      22 Jul 2021 07:33  Phos  2.9     07-22  Mg     2.00     07-22    TPro  5.3<L>  /  Alb  2.6<L>  /  TBili  0.7  /  DBili  x   /  AST  18  /  ALT  6   /  AlkPhos  225<H>  07-22

## 2021-07-23 NOTE — PROGRESS NOTE ADULT - NSICDXPILOT_GEN_ALL_CORE
[place]

## 2021-07-23 NOTE — PROGRESS NOTE ADULT - PROBLEM SELECTOR PLAN 4
Chronic since Feb 2021 on Xarelto. Last dose was 7/13  -Bridge on lovenox bid while inpatient

## 2021-07-23 NOTE — PROGRESS NOTE ADULT - PROBLEM SELECTOR PROBLEM 5
Colorectal cancer

## 2021-07-23 NOTE — CHART NOTE - NSCHARTNOTEFT_GEN_A_CORE
Per discussion with procedure team and Dr. Villa- can resume full dose AC 24 hours after paracentesis. Pt medically cleared for discharge home per oncology Dr. Augustin and Dr. Villa.

## 2021-07-23 NOTE — PROGRESS NOTE ADULT - PROBLEM SELECTOR PLAN 3
Decreased with IVF in ED. Most likely SIADH vs hypervolemia from liver cirrhosis/disease  -Fluid restrict for now unless patient becomes hypotensive  -Urine Na/osm
Decreased with IVF in ED. Most likely SIADH vs hypervolemia from liver cirrhosis/disease  -Fluid restrict for now unless patient becomes hypotensive  -Urine Na/osm pending
Decreased with IVF in ED. Most likely SIADH vs hypervolemia from liver cirrhosis/disease  -Fluid restrict for now unless patient becomes hypotensive  -Urine Na/osm
Decreased with IVF in ED. Most likely SIADH vs hypervolemia from liver cirrhosis/disease  -Fluid restrict for now unless patient becomes hypotensive  -Urine Na/osm

## 2021-07-24 LAB
HSV1 AB FLD QL: NEGATIVE — SIGNIFICANT CHANGE UP
HSV2 AB FLD-ACNC: NEGATIVE — SIGNIFICANT CHANGE UP

## 2021-07-25 LAB
CULTURE RESULTS: SIGNIFICANT CHANGE UP
SPECIMEN SOURCE: SIGNIFICANT CHANGE UP

## 2021-08-14 LAB
CULTURE RESULTS: SIGNIFICANT CHANGE UP
SPECIMEN SOURCE: SIGNIFICANT CHANGE UP

## 2021-10-03 ENCOUNTER — INPATIENT (INPATIENT)
Facility: HOSPITAL | Age: 66
LOS: 8 days | Discharge: HOSPICE | End: 2021-10-12
Attending: INTERNAL MEDICINE | Admitting: INTERNAL MEDICINE
Payer: MEDICARE

## 2021-10-03 VITALS
HEART RATE: 92 BPM | TEMPERATURE: 98 F | SYSTOLIC BLOOD PRESSURE: 101 MMHG | OXYGEN SATURATION: 100 % | HEIGHT: 65 IN | RESPIRATION RATE: 20 BRPM | DIASTOLIC BLOOD PRESSURE: 77 MMHG

## 2021-10-03 DIAGNOSIS — Z90.79 ACQUIRED ABSENCE OF OTHER GENITAL ORGAN(S): Chronic | ICD-10-CM

## 2021-10-03 LAB
ALBUMIN FLD-MCNC: <1 G/DL — SIGNIFICANT CHANGE UP
ALBUMIN SERPL ELPH-MCNC: 2.6 G/DL — LOW (ref 3.3–5)
ALP SERPL-CCNC: 1257 U/L — HIGH (ref 40–120)
ALT FLD-CCNC: 33 U/L — SIGNIFICANT CHANGE UP (ref 4–41)
ANION GAP SERPL CALC-SCNC: 20 MMOL/L — HIGH (ref 7–14)
ANISOCYTOSIS BLD QL: SLIGHT — SIGNIFICANT CHANGE UP
AST SERPL-CCNC: 88 U/L — HIGH (ref 4–40)
B PERT IGG+IGM PNL SER: ABNORMAL
BASE EXCESS BLDV CALC-SCNC: -6.7 MMOL/L — LOW (ref -2–3)
BASOPHILS # BLD AUTO: 0 K/UL — SIGNIFICANT CHANGE UP (ref 0–0.2)
BASOPHILS NFR BLD AUTO: 0 % — SIGNIFICANT CHANGE UP (ref 0–2)
BILIRUB SERPL-MCNC: 1.2 MG/DL — SIGNIFICANT CHANGE UP (ref 0.2–1.2)
BLOOD GAS VENOUS COMPREHENSIVE RESULT: SIGNIFICANT CHANGE UP
BUN SERPL-MCNC: 77 MG/DL — HIGH (ref 7–23)
CALCIUM SERPL-MCNC: 8.5 MG/DL — SIGNIFICANT CHANGE UP (ref 8.4–10.5)
CHLORIDE BLDV-SCNC: 91 MMOL/L — LOW (ref 96–108)
CHLORIDE SERPL-SCNC: 88 MMOL/L — LOW (ref 98–107)
CO2 BLDV-SCNC: 20.4 MMOL/L — LOW (ref 22–26)
CO2 SERPL-SCNC: 17 MMOL/L — LOW (ref 22–31)
COLOR FLD: YELLOW
CREAT SERPL-MCNC: 2.23 MG/DL — HIGH (ref 0.5–1.3)
DACRYOCYTES BLD QL SMEAR: SLIGHT — SIGNIFICANT CHANGE UP
ELLIPTOCYTES BLD QL SMEAR: SLIGHT — SIGNIFICANT CHANGE UP
EOSINOPHIL # BLD AUTO: 0 K/UL — SIGNIFICANT CHANGE UP (ref 0–0.5)
EOSINOPHIL NFR BLD AUTO: 0 % — SIGNIFICANT CHANGE UP (ref 0–6)
FLUID INTAKE SUBSTANCE CLASS: SIGNIFICANT CHANGE UP
GAS PNL BLDV: 121 MMOL/L — LOW (ref 136–145)
GIANT PLATELETS BLD QL SMEAR: PRESENT — SIGNIFICANT CHANGE UP
GLUCOSE BLDV-MCNC: 124 MG/DL — HIGH (ref 70–99)
GLUCOSE FLD-MCNC: 125 MG/DL — SIGNIFICANT CHANGE UP
GLUCOSE SERPL-MCNC: 123 MG/DL — HIGH (ref 70–99)
HCO3 BLDV-SCNC: 19 MMOL/L — LOW (ref 22–29)
HCT VFR BLD CALC: 34.8 % — LOW (ref 39–50)
HCT VFR BLDA CALC: 33 % — LOW (ref 39–51)
HGB BLD CALC-MCNC: 11.1 G/DL — LOW (ref 13–17)
HGB BLD-MCNC: 11.2 G/DL — LOW (ref 13–17)
HYPOCHROMIA BLD QL: SLIGHT — SIGNIFICANT CHANGE UP
IANC: 7.59 K/UL — SIGNIFICANT CHANGE UP (ref 1.5–8.5)
LACTATE BLDV-MCNC: 5.1 MMOL/L — CRITICAL HIGH (ref 0.5–2)
LDH SERPL L TO P-CCNC: 209 U/L — SIGNIFICANT CHANGE UP
LYMPHOCYTES # BLD AUTO: 0.08 K/UL — LOW (ref 1–3.3)
LYMPHOCYTES # BLD AUTO: 0.9 % — LOW (ref 13–44)
MCHC RBC-ENTMCNC: 22.5 PG — LOW (ref 27–34)
MCHC RBC-ENTMCNC: 32.2 GM/DL — SIGNIFICANT CHANGE UP (ref 32–36)
MCV RBC AUTO: 70 FL — LOW (ref 80–100)
MICROCYTES BLD QL: SLIGHT — SIGNIFICANT CHANGE UP
MONOCYTES # BLD AUTO: 0.22 K/UL — SIGNIFICANT CHANGE UP (ref 0–0.9)
MONOCYTES NFR BLD AUTO: 2.6 % — SIGNIFICANT CHANGE UP (ref 2–14)
NEUTROPHILS # BLD AUTO: 8.24 K/UL — HIGH (ref 1.8–7.4)
NEUTROPHILS NFR BLD AUTO: 95.6 % — HIGH (ref 43–77)
NEUTS BAND # BLD: 0.9 % — SIGNIFICANT CHANGE UP (ref 0–6)
NT-PROBNP SERPL-SCNC: 4847 PG/ML — HIGH
OSMOLALITY SERPL: 294 MOSM/KG — SIGNIFICANT CHANGE UP (ref 275–295)
PCO2 BLDV: 39 MMHG — LOW (ref 42–55)
PH BLDV: 7.3 — LOW (ref 7.32–7.43)
PLAT MORPH BLD: NORMAL — SIGNIFICANT CHANGE UP
PLATELET # BLD AUTO: 124 K/UL — LOW (ref 150–400)
PLATELET COUNT - ESTIMATE: ABNORMAL
PO2 BLDV: 37 MMHG — SIGNIFICANT CHANGE UP
POIKILOCYTOSIS BLD QL AUTO: SLIGHT — SIGNIFICANT CHANGE UP
POTASSIUM BLDV-SCNC: 5.7 MMOL/L — HIGH (ref 3.5–5.1)
POTASSIUM SERPL-MCNC: 5.8 MMOL/L — HIGH (ref 3.5–5.3)
POTASSIUM SERPL-SCNC: 5.8 MMOL/L — HIGH (ref 3.5–5.3)
PROT FLD-MCNC: 1.9 G/DL — SIGNIFICANT CHANGE UP
PROT SERPL-MCNC: 5.6 G/DL — LOW (ref 6–8.3)
RBC # BLD: 4.97 M/UL — SIGNIFICANT CHANGE UP (ref 4.2–5.8)
RBC # FLD: 21.4 % — HIGH (ref 10.3–14.5)
RBC BLD AUTO: ABNORMAL
RCV VOL RI: <1000 CELLS/UL — HIGH (ref 0–5)
SAO2 % BLDV: 58 % — SIGNIFICANT CHANGE UP
SCHISTOCYTES BLD QL AUTO: SLIGHT — SIGNIFICANT CHANGE UP
SODIUM SERPL-SCNC: 125 MMOL/L — LOW (ref 135–145)
TARGETS BLD QL SMEAR: SLIGHT — SIGNIFICANT CHANGE UP
TOTAL NUCLEATED CELL COUNT, BODY FLUID: 320 CELLS/UL — HIGH (ref 0–5)
TROPONIN T, HIGH SENSITIVITY RESULT: 97 NG/L — CRITICAL HIGH
TUBE TYPE: SIGNIFICANT CHANGE UP
WBC # BLD: 8.54 K/UL — SIGNIFICANT CHANGE UP (ref 3.8–10.5)
WBC # FLD AUTO: 8.54 K/UL — SIGNIFICANT CHANGE UP (ref 3.8–10.5)

## 2021-10-03 PROCEDURE — 99285 EMERGENCY DEPT VISIT HI MDM: CPT | Mod: GC

## 2021-10-03 PROCEDURE — 71045 X-RAY EXAM CHEST 1 VIEW: CPT | Mod: 26

## 2021-10-03 RX ORDER — KETOROLAC TROMETHAMINE 30 MG/ML
15 SYRINGE (ML) INJECTION ONCE
Refills: 0 | Status: DISCONTINUED | OUTPATIENT
Start: 2021-10-03 | End: 2021-10-03

## 2021-10-03 RX ORDER — ACETAMINOPHEN 500 MG
1000 TABLET ORAL ONCE
Refills: 0 | Status: COMPLETED | OUTPATIENT
Start: 2021-10-03 | End: 2021-10-03

## 2021-10-03 RX ORDER — SODIUM CHLORIDE 9 MG/ML
1000 INJECTION, SOLUTION INTRAVENOUS ONCE
Refills: 0 | Status: COMPLETED | OUTPATIENT
Start: 2021-10-03 | End: 2021-10-03

## 2021-10-03 RX ORDER — CEFTRIAXONE 500 MG/1
2000 INJECTION, POWDER, FOR SOLUTION INTRAMUSCULAR; INTRAVENOUS ONCE
Refills: 0 | Status: COMPLETED | OUTPATIENT
Start: 2021-10-03 | End: 2021-10-03

## 2021-10-03 RX ORDER — CALCIUM GLUCONATE 100 MG/ML
1 VIAL (ML) INTRAVENOUS ONCE
Refills: 0 | Status: COMPLETED | OUTPATIENT
Start: 2021-10-03 | End: 2021-10-03

## 2021-10-03 RX ORDER — INSULIN HUMAN 100 [IU]/ML
5 INJECTION, SOLUTION SUBCUTANEOUS ONCE
Refills: 0 | Status: COMPLETED | OUTPATIENT
Start: 2021-10-03 | End: 2021-10-03

## 2021-10-03 RX ORDER — HYDROMORPHONE HYDROCHLORIDE 2 MG/ML
0.5 INJECTION INTRAMUSCULAR; INTRAVENOUS; SUBCUTANEOUS ONCE
Refills: 0 | Status: DISCONTINUED | OUTPATIENT
Start: 2021-10-03 | End: 2021-10-03

## 2021-10-03 RX ORDER — DEXTROSE 50 % IN WATER 50 %
50 SYRINGE (ML) INTRAVENOUS ONCE
Refills: 0 | Status: COMPLETED | OUTPATIENT
Start: 2021-10-03 | End: 2021-10-03

## 2021-10-03 RX ADMIN — CEFTRIAXONE 2000 MILLIGRAM(S): 500 INJECTION, POWDER, FOR SOLUTION INTRAMUSCULAR; INTRAVENOUS at 23:59

## 2021-10-03 RX ADMIN — Medication 400 MILLIGRAM(S): at 21:22

## 2021-10-03 RX ADMIN — Medication 15 MILLIGRAM(S): at 23:59

## 2021-10-03 RX ADMIN — SODIUM CHLORIDE 1000 MILLILITER(S): 9 INJECTION, SOLUTION INTRAVENOUS at 20:36

## 2021-10-03 RX ADMIN — Medication 50 MILLILITER(S): at 23:33

## 2021-10-03 RX ADMIN — Medication 1000 MILLIGRAM(S): at 23:59

## 2021-10-03 RX ADMIN — HYDROMORPHONE HYDROCHLORIDE 0.5 MILLIGRAM(S): 2 INJECTION INTRAMUSCULAR; INTRAVENOUS; SUBCUTANEOUS at 22:57

## 2021-10-03 RX ADMIN — Medication 100 GRAM(S): at 22:25

## 2021-10-03 RX ADMIN — SODIUM CHLORIDE 1000 MILLILITER(S): 9 INJECTION, SOLUTION INTRAVENOUS at 23:59

## 2021-10-03 RX ADMIN — Medication 1 GRAM(S): at 23:59

## 2021-10-03 RX ADMIN — INSULIN HUMAN 5 UNIT(S): 100 INJECTION, SOLUTION SUBCUTANEOUS at 23:34

## 2021-10-03 RX ADMIN — HYDROMORPHONE HYDROCHLORIDE 0.5 MILLIGRAM(S): 2 INJECTION INTRAMUSCULAR; INTRAVENOUS; SUBCUTANEOUS at 23:59

## 2021-10-03 RX ADMIN — Medication 15 MILLIGRAM(S): at 21:21

## 2021-10-03 RX ADMIN — Medication 1000 MILLIGRAM(S): at 23:58

## 2021-10-03 RX ADMIN — CEFTRIAXONE 100 MILLIGRAM(S): 500 INJECTION, POWDER, FOR SOLUTION INTRAMUSCULAR; INTRAVENOUS at 22:58

## 2021-10-03 NOTE — ED ADULT NURSE NOTE - OBJECTIVE STATEMENT
Float: Received patient to room 20 for weakness. A&o4, ambulatory with assistance, brought in by family for AMS and weakness. Pt wife stating took pt's BP and it was low, pt also moaning with is not baseline. Hx of colon cancer (last chemo august), PEG tube and right chest wall port, pt wife does not want port accessed at this time, MD Gonzales aware. RR even and unlabored, abdomen soft and nondistended. Pt responsive. Right  20G AC placed, labs sent. Vitals as noted. Report given to primary RN.

## 2021-10-03 NOTE — ED ADULT TRIAGE NOTE - CHIEF COMPLAINT QUOTE
Pt with hx of colon cancer on chemo. Pt EMS was activated because pt became unable to ambulate. Pt appears lethargic with minimal response.

## 2021-10-03 NOTE — ED PROVIDER NOTE - OBJECTIVE STATEMENT
67 yo M with a pmhx of colon CA with mets currently on chemo, missed recent chemo session, DVT on xarelto, chronic abdominal port 2/2 ascitics with prior SBP presents to the ED with increased abdominal pain and AMS. Patient lives at home with wife. Rior to arrival to the ED, Patient became more lethargic and was bedridden. Patient has been decreased po intake over the last few days. He denies any fevers, chill, N/V/D. He admits to abdominal pain. He denies any other symptoms at bedside.

## 2021-10-03 NOTE — ED ADULT NURSE NOTE - NSIMPLEMENTINTERV_GEN_ALL_ED
Implemented All Fall Risk Interventions:  [place] to call system. Call bell, personal items and telephone within reach. Instruct patient to call for assistance. Room bathroom lighting operational. Non-slip footwear when patient is off stretcher. Physically safe environment: no spills, clutter or unnecessary equipment. Stretcher in lowest position, wheels locked, appropriate side rails in place. Provide visual cue, wrist band, yellow gown, etc. Monitor gait and stability. Monitor for mental status changes and reorient to person, place, and time. Review medications for side effects contributing to fall risk. Reinforce activity limits and safety measures with patient and family.

## 2021-10-03 NOTE — ED PROVIDER NOTE - CARE PLAN
Principal Discharge DX:	Hyponatremia  Secondary Diagnosis:	Hyperkalemia  Secondary Diagnosis:	Failure to thrive in adult  Secondary Diagnosis:	SIRISHA (acute kidney injury)   1

## 2021-10-03 NOTE — ED PROVIDER NOTE - PHYSICAL EXAMINATION
GENERAL: no acute distress, cachetic, unwell appearing  HEAD: normocephalic, atraumatic  HEENT: normal conjunctiva, oral mucosa dry, neck supple  CARDIAC: regular rate and rhythm, normal S1 and S2,  no appreciable murmurs  PULM: clear to ascultation bilaterally, no crackles, rales, rhonchi, or wheezing    GI: abdomen nondistended, soft, nontender, no guarding or rebound tenderness  : no CVA tenderness, no suprapubic tenderness    NEURO: alert and oriented x 3, normal speech, PERRLA, EOMI, no focal motor or sensory deficits    MSK: no visible deformities, no peripheral edema, calf tenderness/redness/swelling  SKIN: no visible rashes, dry, well-perfused  PSYCH: appropriate mood and affect GENERAL: no acute distress, cachetic, unwell appearing  HEAD: normocephalic, atraumatic  HEENT: normal conjunctiva, oral mucosa dry, neck supple  CARDIAC: regular rate and rhythm, normal S1 and S2,  no appreciable murmurs  PULM: clear to ascultation bilaterally, no crackles, rales, rhonchi, or wheezing    GI: abdomen distended, soft, nontender, no guarding or rebound tenderness  : no CVA tenderness, no suprapubic tenderness    NEURO: alert and oriented x 3, normal speech, PERRLA, EOMI, no focal motor or sensory deficits    MSK: no visible deformities, no peripheral edema, calf tenderness/redness/swelling  SKIN: no visible rashes, dry, well-perfused  PSYCH: appropriate mood and affect

## 2021-10-03 NOTE — ED PROVIDER NOTE - CLINICAL SUMMARY MEDICAL DECISION MAKING FREE TEXT BOX
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 Patient is more lethargic in nature compared to baseline as per family. missed chemo more than 1 month ago. BP soft, other vitals wnl. PE as noted above. cachetic and unwell appearing. dry mucosal membranes. peritoneal catheter in place, abdomen distended without tenderness. concerns for progression of cancer vs lyte abnormality vs sbp. will order labs, imaging, ekg, med, reassess

## 2021-10-03 NOTE — ED PROVIDER NOTE - ATTENDING CONTRIBUTION TO CARE
67 yo male with PMH colon ca presents to ED for evaluation of abdominal pain and more tiredness. Wife reports that he missed last chemo session because he wasn't feeling well. PE: aaox1-2, tired appearing, generalized abd ttp. A/P Labs, imaging, medicate, adm

## 2021-10-04 DIAGNOSIS — E87.5 HYPERKALEMIA: ICD-10-CM

## 2021-10-04 DIAGNOSIS — Z29.9 ENCOUNTER FOR PROPHYLACTIC MEASURES, UNSPECIFIED: ICD-10-CM

## 2021-10-04 DIAGNOSIS — N17.9 ACUTE KIDNEY FAILURE, UNSPECIFIED: ICD-10-CM

## 2021-10-04 DIAGNOSIS — E87.1 HYPO-OSMOLALITY AND HYPONATREMIA: ICD-10-CM

## 2021-10-04 DIAGNOSIS — E88.3 TUMOR LYSIS SYNDROME: ICD-10-CM

## 2021-10-04 DIAGNOSIS — G93.41 METABOLIC ENCEPHALOPATHY: ICD-10-CM

## 2021-10-04 DIAGNOSIS — R09.89 OTHER SPECIFIED SYMPTOMS AND SIGNS INVOLVING THE CIRCULATORY AND RESPIRATORY SYSTEMS: ICD-10-CM

## 2021-10-04 DIAGNOSIS — C18.9 MALIGNANT NEOPLASM OF COLON, UNSPECIFIED: ICD-10-CM

## 2021-10-04 DIAGNOSIS — E43 UNSPECIFIED SEVERE PROTEIN-CALORIE MALNUTRITION: ICD-10-CM

## 2021-10-04 DIAGNOSIS — I82.409 ACUTE EMBOLISM AND THROMBOSIS OF UNSPECIFIED DEEP VEINS OF UNSPECIFIED LOWER EXTREMITY: ICD-10-CM

## 2021-10-04 DIAGNOSIS — K65.2 SPONTANEOUS BACTERIAL PERITONITIS: ICD-10-CM

## 2021-10-04 PROBLEM — C19 MALIGNANT NEOPLASM OF RECTOSIGMOID JUNCTION: Chronic | Status: ACTIVE | Noted: 2021-07-15

## 2021-10-04 LAB
ALBUMIN SERPL ELPH-MCNC: 1 G/DL — LOW (ref 3.3–5)
ALBUMIN SERPL ELPH-MCNC: 1.2 G/DL — LOW (ref 3.3–5)
ALBUMIN SERPL ELPH-MCNC: 2.4 G/DL — LOW (ref 3.3–5)
ALP SERPL-CCNC: 1153 U/L — HIGH (ref 40–120)
ALP SERPL-CCNC: 478 U/L — HIGH (ref 40–120)
ALP SERPL-CCNC: 611 U/L — HIGH (ref 40–120)
ALT FLD-CCNC: 13 U/L — SIGNIFICANT CHANGE UP (ref 4–41)
ALT FLD-CCNC: 18 U/L — SIGNIFICANT CHANGE UP (ref 4–41)
ALT FLD-CCNC: 32 U/L — SIGNIFICANT CHANGE UP (ref 4–41)
ANION GAP SERPL CALC-SCNC: 12 MMOL/L — SIGNIFICANT CHANGE UP (ref 7–14)
ANION GAP SERPL CALC-SCNC: 12 MMOL/L — SIGNIFICANT CHANGE UP (ref 7–14)
ANION GAP SERPL CALC-SCNC: 17 MMOL/L — HIGH (ref 7–14)
ANION GAP SERPL CALC-SCNC: 17 MMOL/L — HIGH (ref 7–14)
APTT BLD: 29.5 SEC — SIGNIFICANT CHANGE UP (ref 27–36.3)
APTT BLD: 79.6 SEC — HIGH (ref 27–36.3)
AST SERPL-CCNC: 32 U/L — SIGNIFICANT CHANGE UP (ref 4–40)
AST SERPL-CCNC: 40 U/L — SIGNIFICANT CHANGE UP (ref 4–40)
AST SERPL-CCNC: 76 U/L — HIGH (ref 4–40)
BASE EXCESS BLDV CALC-SCNC: -5.7 MMOL/L — LOW (ref -2–3)
BILIRUB SERPL-MCNC: 0.4 MG/DL — SIGNIFICANT CHANGE UP (ref 0.2–1.2)
BILIRUB SERPL-MCNC: 0.6 MG/DL — SIGNIFICANT CHANGE UP (ref 0.2–1.2)
BILIRUB SERPL-MCNC: 1.1 MG/DL — SIGNIFICANT CHANGE UP (ref 0.2–1.2)
BLOOD GAS VENOUS COMPREHENSIVE RESULT: SIGNIFICANT CHANGE UP
BLOOD GAS VENOUS COMPREHENSIVE RESULT: SIGNIFICANT CHANGE UP
BUN SERPL-MCNC: 44 MG/DL — HIGH (ref 7–23)
BUN SERPL-MCNC: 55 MG/DL — HIGH (ref 7–23)
BUN SERPL-MCNC: 79 MG/DL — HIGH (ref 7–23)
BUN SERPL-MCNC: 85 MG/DL — HIGH (ref 7–23)
CALCIUM SERPL-MCNC: 3.5 MG/DL — CRITICAL LOW (ref 8.4–10.5)
CALCIUM SERPL-MCNC: 4.5 MG/DL — CRITICAL LOW (ref 8.4–10.5)
CALCIUM SERPL-MCNC: 8.4 MG/DL — SIGNIFICANT CHANGE UP (ref 8.4–10.5)
CALCIUM SERPL-MCNC: 8.5 MG/DL — SIGNIFICANT CHANGE UP (ref 8.4–10.5)
CHLORIDE BLDV-SCNC: 94 MMOL/L — LOW (ref 96–108)
CHLORIDE SERPL-SCNC: 115 MMOL/L — HIGH (ref 98–107)
CHLORIDE SERPL-SCNC: 119 MMOL/L — HIGH (ref 98–107)
CHLORIDE SERPL-SCNC: 91 MMOL/L — LOW (ref 98–107)
CHLORIDE SERPL-SCNC: 91 MMOL/L — LOW (ref 98–107)
CO2 BLDV-SCNC: 21.3 MMOL/L — LOW (ref 22–26)
CO2 SERPL-SCNC: 12 MMOL/L — LOW (ref 22–31)
CO2 SERPL-SCNC: 18 MMOL/L — LOW (ref 22–31)
CO2 SERPL-SCNC: 19 MMOL/L — LOW (ref 22–31)
CO2 SERPL-SCNC: 9 MMOL/L — CRITICAL LOW (ref 22–31)
CREAT SERPL-MCNC: 0.99 MG/DL — SIGNIFICANT CHANGE UP (ref 0.5–1.3)
CREAT SERPL-MCNC: 1.28 MG/DL — SIGNIFICANT CHANGE UP (ref 0.5–1.3)
CREAT SERPL-MCNC: 2.35 MG/DL — HIGH (ref 0.5–1.3)
CREAT SERPL-MCNC: 2.57 MG/DL — HIGH (ref 0.5–1.3)
FLUID SEGMENTED GRANULOCYTES: 6 % — SIGNIFICANT CHANGE UP
GAS PNL BLDV: 121 MMOL/L — LOW (ref 136–145)
GLUCOSE BLDV-MCNC: 114 MG/DL — HIGH (ref 70–99)
GLUCOSE SERPL-MCNC: 100 MG/DL — HIGH (ref 70–99)
GLUCOSE SERPL-MCNC: 115 MG/DL — HIGH (ref 70–99)
GLUCOSE SERPL-MCNC: 54 MG/DL — CRITICAL LOW (ref 70–99)
GLUCOSE SERPL-MCNC: 67 MG/DL — LOW (ref 70–99)
GRAM STN FLD: SIGNIFICANT CHANGE UP
HCO3 BLDV-SCNC: 20 MMOL/L — LOW (ref 22–29)
HCT VFR BLD CALC: 18.5 % — CRITICAL LOW (ref 39–50)
HCT VFR BLD CALC: 31.5 % — LOW (ref 39–50)
HCT VFR BLD CALC: 31.6 % — LOW (ref 39–50)
HCT VFR BLDA CALC: 31 % — LOW (ref 39–51)
HGB BLD CALC-MCNC: 10.2 G/DL — LOW (ref 13–17)
HGB BLD-MCNC: 10 G/DL — LOW (ref 13–17)
HGB BLD-MCNC: 6.1 G/DL — CRITICAL LOW (ref 13–17)
HGB BLD-MCNC: 9.9 G/DL — LOW (ref 13–17)
LACTATE BLDV-MCNC: 3.6 MMOL/L — HIGH (ref 0.5–2)
LACTATE SERPL-SCNC: 3.6 MMOL/L — HIGH (ref 0.5–2)
LDH SERPL L TO P-CCNC: 384 U/L — HIGH (ref 135–225)
LDH SERPL L TO P-CCNC: 677 U/L — HIGH (ref 135–225)
LYMPHOCYTES # FLD: 23 % — SIGNIFICANT CHANGE UP
MAGNESIUM SERPL-MCNC: 1.2 MG/DL — LOW (ref 1.6–2.6)
MAGNESIUM SERPL-MCNC: 1.5 MG/DL — LOW (ref 1.6–2.6)
MAGNESIUM SERPL-MCNC: 2.6 MG/DL — SIGNIFICANT CHANGE UP (ref 1.6–2.6)
MAGNESIUM SERPL-MCNC: 2.7 MG/DL — HIGH (ref 1.6–2.6)
MANUAL SMEAR VERIFICATION: SIGNIFICANT CHANGE UP
MCHC RBC-ENTMCNC: 22 PG — LOW (ref 27–34)
MCHC RBC-ENTMCNC: 22.4 PG — LOW (ref 27–34)
MCHC RBC-ENTMCNC: 23.2 PG — LOW (ref 27–34)
MCHC RBC-ENTMCNC: 31.3 GM/DL — LOW (ref 32–36)
MCHC RBC-ENTMCNC: 31.7 GM/DL — LOW (ref 32–36)
MCHC RBC-ENTMCNC: 33 GM/DL — SIGNIFICANT CHANGE UP (ref 32–36)
MCV RBC AUTO: 70.1 FL — LOW (ref 80–100)
MCV RBC AUTO: 70.3 FL — LOW (ref 80–100)
MCV RBC AUTO: 70.6 FL — LOW (ref 80–100)
MESOTHL CELL # FLD: 5 % — SIGNIFICANT CHANGE UP
MONOS+MACROS # FLD: 66 % — SIGNIFICANT CHANGE UP
NRBC # BLD: 0 /100 WBCS — SIGNIFICANT CHANGE UP
NRBC # FLD: 0 K/UL — SIGNIFICANT CHANGE UP
PCO2 BLDV: 40 MMHG — LOW (ref 42–55)
PH BLDV: 7.31 — LOW (ref 7.32–7.43)
PHOSPHATE SERPL-MCNC: 1.8 MG/DL — LOW (ref 2.5–4.5)
PHOSPHATE SERPL-MCNC: 4 MG/DL — SIGNIFICANT CHANGE UP (ref 2.5–4.5)
PHOSPHATE SERPL-MCNC: 4.1 MG/DL — SIGNIFICANT CHANGE UP (ref 2.5–4.5)
PLAT MORPH BLD: NORMAL — SIGNIFICANT CHANGE UP
PLATELET # BLD AUTO: 106 K/UL — LOW (ref 150–400)
PLATELET # BLD AUTO: 110 K/UL — LOW (ref 150–400)
PLATELET # BLD AUTO: 68 K/UL — LOW (ref 150–400)
PLATELET COUNT - ESTIMATE: ABNORMAL
PO2 BLDV: 49 MMHG — SIGNIFICANT CHANGE UP
POTASSIUM BLDV-SCNC: SIGNIFICANT CHANGE UP MMOL/L (ref 3.5–5.1)
POTASSIUM SERPL-MCNC: 2.3 MMOL/L — CRITICAL LOW (ref 3.5–5.3)
POTASSIUM SERPL-MCNC: 3.1 MMOL/L — LOW (ref 3.5–5.3)
POTASSIUM SERPL-MCNC: 5.4 MMOL/L — HIGH (ref 3.5–5.3)
POTASSIUM SERPL-MCNC: 5.7 MMOL/L — HIGH (ref 3.5–5.3)
POTASSIUM SERPL-SCNC: 2.3 MMOL/L — CRITICAL LOW (ref 3.5–5.3)
POTASSIUM SERPL-SCNC: 3.1 MMOL/L — LOW (ref 3.5–5.3)
POTASSIUM SERPL-SCNC: 5.4 MMOL/L — HIGH (ref 3.5–5.3)
POTASSIUM SERPL-SCNC: 5.7 MMOL/L — HIGH (ref 3.5–5.3)
PROT SERPL-MCNC: 2.2 G/DL — LOW (ref 6–8.3)
PROT SERPL-MCNC: 2.9 G/DL — LOW (ref 6–8.3)
PROT SERPL-MCNC: 5.3 G/DL — LOW (ref 6–8.3)
RBC # BLD: 2.63 M/UL — LOW (ref 4.2–5.8)
RBC # BLD: 4.46 M/UL — SIGNIFICANT CHANGE UP (ref 4.2–5.8)
RBC # BLD: 4.51 M/UL — SIGNIFICANT CHANGE UP (ref 4.2–5.8)
RBC # FLD: 20.8 % — HIGH (ref 10.3–14.5)
RBC # FLD: 20.8 % — HIGH (ref 10.3–14.5)
RBC # FLD: 21.4 % — HIGH (ref 10.3–14.5)
RBC BLD AUTO: SIGNIFICANT CHANGE UP
SAO2 % BLDV: 80.9 % — SIGNIFICANT CHANGE UP
SARS-COV-2 RNA SPEC QL NAA+PROBE: SIGNIFICANT CHANGE UP
SODIUM SERPL-SCNC: 126 MMOL/L — LOW (ref 135–145)
SODIUM SERPL-SCNC: 127 MMOL/L — LOW (ref 135–145)
SODIUM SERPL-SCNC: 139 MMOL/L — SIGNIFICANT CHANGE UP (ref 135–145)
SODIUM SERPL-SCNC: 140 MMOL/L — SIGNIFICANT CHANGE UP (ref 135–145)
SPECIMEN SOURCE: SIGNIFICANT CHANGE UP
URATE SERPL-MCNC: 11.4 MG/DL — HIGH (ref 3.4–8.8)
URATE SERPL-MCNC: 14.3 MG/DL — HIGH (ref 3.4–8.8)
URATE SERPL-MCNC: 4.5 MG/DL — SIGNIFICANT CHANGE UP (ref 3.4–8.8)
URATE SERPL-MCNC: 5.8 MG/DL — SIGNIFICANT CHANGE UP (ref 3.4–8.8)
WBC # BLD: 10.01 K/UL — SIGNIFICANT CHANGE UP (ref 3.8–10.5)
WBC # BLD: 6.5 K/UL — SIGNIFICANT CHANGE UP (ref 3.8–10.5)
WBC # BLD: 8.65 K/UL — SIGNIFICANT CHANGE UP (ref 3.8–10.5)
WBC # FLD AUTO: 10.01 K/UL — SIGNIFICANT CHANGE UP (ref 3.8–10.5)
WBC # FLD AUTO: 6.5 K/UL — SIGNIFICANT CHANGE UP (ref 3.8–10.5)
WBC # FLD AUTO: 8.65 K/UL — SIGNIFICANT CHANGE UP (ref 3.8–10.5)

## 2021-10-04 PROCEDURE — 99291 CRITICAL CARE FIRST HOUR: CPT

## 2021-10-04 PROCEDURE — 99223 1ST HOSP IP/OBS HIGH 75: CPT

## 2021-10-04 PROCEDURE — 70450 CT HEAD/BRAIN W/O DYE: CPT | Mod: 26

## 2021-10-04 PROCEDURE — 74176 CT ABD & PELVIS W/O CONTRAST: CPT | Mod: 26

## 2021-10-04 RX ORDER — CEFTRIAXONE 500 MG/1
1000 INJECTION, POWDER, FOR SOLUTION INTRAMUSCULAR; INTRAVENOUS EVERY 24 HOURS
Refills: 0 | Status: DISCONTINUED | OUTPATIENT
Start: 2021-10-04 | End: 2021-10-06

## 2021-10-04 RX ORDER — HEPARIN SODIUM 5000 [USP'U]/ML
INJECTION INTRAVENOUS; SUBCUTANEOUS
Qty: 25000 | Refills: 0 | Status: DISCONTINUED | OUTPATIENT
Start: 2021-10-04 | End: 2021-10-12

## 2021-10-04 RX ORDER — POTASSIUM CHLORIDE 20 MEQ
40 PACKET (EA) ORAL ONCE
Refills: 0 | Status: DISCONTINUED | OUTPATIENT
Start: 2021-10-04 | End: 2021-10-04

## 2021-10-04 RX ORDER — INSULIN HUMAN 100 [IU]/ML
10 INJECTION, SOLUTION SUBCUTANEOUS ONCE
Refills: 0 | Status: DISCONTINUED | OUTPATIENT
Start: 2021-10-04 | End: 2021-10-04

## 2021-10-04 RX ORDER — POTASSIUM CHLORIDE 20 MEQ
10 PACKET (EA) ORAL
Refills: 0 | Status: DISCONTINUED | OUTPATIENT
Start: 2021-10-04 | End: 2021-10-05

## 2021-10-04 RX ORDER — HYDROMORPHONE HYDROCHLORIDE 2 MG/ML
0.5 INJECTION INTRAMUSCULAR; INTRAVENOUS; SUBCUTANEOUS ONCE
Refills: 0 | Status: DISCONTINUED | OUTPATIENT
Start: 2021-10-04 | End: 2021-10-04

## 2021-10-04 RX ORDER — RASBURICASE 7.5 MG
6 KIT INTRAVENOUS ONCE
Refills: 0 | Status: COMPLETED | OUTPATIENT
Start: 2021-10-04 | End: 2021-10-04

## 2021-10-04 RX ORDER — HYDROMORPHONE HYDROCHLORIDE 2 MG/ML
0.5 INJECTION INTRAMUSCULAR; INTRAVENOUS; SUBCUTANEOUS EVERY 4 HOURS
Refills: 0 | Status: DISCONTINUED | OUTPATIENT
Start: 2021-10-04 | End: 2021-10-05

## 2021-10-04 RX ORDER — CALCIUM GLUCONATE 100 MG/ML
1 VIAL (ML) INTRAVENOUS ONCE
Refills: 0 | Status: COMPLETED | OUTPATIENT
Start: 2021-10-04 | End: 2021-10-04

## 2021-10-04 RX ORDER — DEXTROSE 50 % IN WATER 50 %
50 SYRINGE (ML) INTRAVENOUS ONCE
Refills: 0 | Status: COMPLETED | OUTPATIENT
Start: 2021-10-04 | End: 2021-10-04

## 2021-10-04 RX ORDER — HEPARIN SODIUM 5000 [USP'U]/ML
1500 INJECTION INTRAVENOUS; SUBCUTANEOUS EVERY 6 HOURS
Refills: 0 | Status: DISCONTINUED | OUTPATIENT
Start: 2021-10-04 | End: 2021-10-12

## 2021-10-04 RX ORDER — INSULIN HUMAN 100 [IU]/ML
5 INJECTION, SOLUTION SUBCUTANEOUS ONCE
Refills: 0 | Status: COMPLETED | OUTPATIENT
Start: 2021-10-04 | End: 2021-10-04

## 2021-10-04 RX ORDER — HEPARIN SODIUM 5000 [USP'U]/ML
3500 INJECTION INTRAVENOUS; SUBCUTANEOUS ONCE
Refills: 0 | Status: COMPLETED | OUTPATIENT
Start: 2021-10-04 | End: 2021-10-04

## 2021-10-04 RX ORDER — ACETAMINOPHEN 500 MG
1000 TABLET ORAL ONCE
Refills: 0 | Status: COMPLETED | OUTPATIENT
Start: 2021-10-04 | End: 2021-10-04

## 2021-10-04 RX ORDER — SODIUM CHLORIDE 9 MG/ML
1000 INJECTION, SOLUTION INTRAVENOUS ONCE
Refills: 0 | Status: COMPLETED | OUTPATIENT
Start: 2021-10-04 | End: 2021-10-04

## 2021-10-04 RX ORDER — HEPARIN SODIUM 5000 [USP'U]/ML
3500 INJECTION INTRAVENOUS; SUBCUTANEOUS EVERY 6 HOURS
Refills: 0 | Status: DISCONTINUED | OUTPATIENT
Start: 2021-10-04 | End: 2021-10-12

## 2021-10-04 RX ORDER — SODIUM CHLORIDE 9 MG/ML
1000 INJECTION INTRAMUSCULAR; INTRAVENOUS; SUBCUTANEOUS
Refills: 0 | Status: DISCONTINUED | OUTPATIENT
Start: 2021-10-04 | End: 2021-10-05

## 2021-10-04 RX ADMIN — Medication 100 GRAM(S): at 22:58

## 2021-10-04 RX ADMIN — SODIUM CHLORIDE 100 MILLILITER(S): 9 INJECTION INTRAMUSCULAR; INTRAVENOUS; SUBCUTANEOUS at 15:52

## 2021-10-04 RX ADMIN — Medication 100 MILLIEQUIVALENT(S): at 23:58

## 2021-10-04 RX ADMIN — Medication 400 MILLIGRAM(S): at 21:58

## 2021-10-04 RX ADMIN — HEPARIN SODIUM 3500 UNIT(S): 5000 INJECTION INTRAVENOUS; SUBCUTANEOUS at 15:52

## 2021-10-04 RX ADMIN — HYDROMORPHONE HYDROCHLORIDE 0.5 MILLIGRAM(S): 2 INJECTION INTRAMUSCULAR; INTRAVENOUS; SUBCUTANEOUS at 12:48

## 2021-10-04 RX ADMIN — INSULIN HUMAN 5 UNIT(S): 100 INJECTION, SOLUTION SUBCUTANEOUS at 18:19

## 2021-10-04 RX ADMIN — HEPARIN SODIUM 800 UNIT(S)/HR: 5000 INJECTION INTRAVENOUS; SUBCUTANEOUS at 22:19

## 2021-10-04 RX ADMIN — HYDROMORPHONE HYDROCHLORIDE 0.5 MILLIGRAM(S): 2 INJECTION INTRAMUSCULAR; INTRAVENOUS; SUBCUTANEOUS at 12:28

## 2021-10-04 RX ADMIN — RASBURICASE 108 MILLIGRAM(S): KIT at 03:36

## 2021-10-04 RX ADMIN — CEFTRIAXONE 100 MILLIGRAM(S): 500 INJECTION, POWDER, FOR SOLUTION INTRAMUSCULAR; INTRAVENOUS at 23:27

## 2021-10-04 RX ADMIN — SODIUM CHLORIDE 2000 MILLILITER(S): 9 INJECTION, SOLUTION INTRAVENOUS at 02:10

## 2021-10-04 RX ADMIN — HEPARIN SODIUM 800 UNIT(S)/HR: 5000 INJECTION INTRAVENOUS; SUBCUTANEOUS at 15:52

## 2021-10-04 RX ADMIN — Medication 50 MILLILITER(S): at 18:22

## 2021-10-04 RX ADMIN — Medication 1000 MILLIGRAM(S): at 22:28

## 2021-10-04 RX ADMIN — HYDROMORPHONE HYDROCHLORIDE 0.5 MILLIGRAM(S): 2 INJECTION INTRAMUSCULAR; INTRAVENOUS; SUBCUTANEOUS at 00:44

## 2021-10-04 NOTE — ED ADULT NURSE REASSESSMENT NOTE - NS ED NURSE REASSESS COMMENT FT1
Report given to ESSU3 RN at this time and pt moved to area. Respirations even and unlabored. No acute distress.

## 2021-10-04 NOTE — SWALLOW BEDSIDE ASSESSMENT ADULT - COMMENTS
Consult received and chart reviewed. Pt received awake, however confusion noted, during clinical swallow evaluation this PM. Pt's wife at bedside, who provided case history information. Pt in lethargic and nonverbal state, without attempt to verbally/gesturally communicate. Pt did not follow directions despite max SLP cues/prompting. Pt receiving supplemental O2 via nasal cannula. Pt not receptive to oral trials during the assessment despite max SLP verbal encouragement and cues.     As per charting, pt is a "66 YOM with  colorectal cancer (mets to liver first dx'd Dec 2020, started chemo in Feb 2021), malignant ascites c/b SBP with abd drain in place,  and DVTs on Xarelto presented to the ED for altered mental status. Pt is lethargic unable to give full history. Collaterals are not available at this time. At per chart, pt became progressively lethargic, bedbound and had poor po intake in the past week, and has missed his chemo session."    WBC is WFL. CXR 10/3/21 revealed "Clear lungs."

## 2021-10-04 NOTE — CONSULT NOTE ADULT - SUBJECTIVE AND OBJECTIVE BOX
CHIEF COMPLAINT:    HPI:  65 yo M with a pmhx of colon CA with mets currently on chemo, missed recent chemo session, DVT on xarelto, chronic abdominal port 2/2 ascitics with prior SBP presents to the ED with increased abdominal pain and AMS. Patient lives at home with wife. Rior to arrival to the ED, Patient became more lethargic and was bedridden. Patient has been decreased po intake over the last few days. He denies any fevers, chill, N/V/D. He admits to abdominal pain. He denies any other symptoms at bedside.    MICU consulted due to concern for TLS based on lab results.  Prior to my evaluation, patient was reportedly talking and crying out in pain. Later received 0.5 mg Dilaudid IV x2. At the time of my evaluation, patient was unresponsive to all but sternal rub. On arousal, patient opens eyes but does not follow commands.    PAST MEDICAL & SURGICAL HISTORY:  HTN (hypertension)    Prostate cancer    Colorectal cancer    S/P TURP (transurethral resection of prostate)        FAMILY HISTORY:  No pertinent family history in first degree relatives        SOCIAL HISTORY:  Smoking: [ ] Never Smoked [ ] Former Smoker (__ packs x ___ years) [ ] Current Smoker  (__ packs x ___ years)  Substance Use: [ ] Never Used [ ] Used ____  EtOH Use:  Marital Status: [ ] Single [ ]  [ ]  [ ]   Sexual History:   Occupation:  Recent Travel:  Country of Birth:  Advance Directives:    Allergies    No Known Allergies    Intolerances        HOME MEDICATIONS:    REVIEW OF SYSTEMS:  Constitutional: [ ] negative [ ] fevers [ ] chills [ ] weight loss [ ] weight gain  HEENT: [ ] negative [ ] dry eyes [ ] eye irritation [ ] postnasal drip [ ] nasal congestion  CV: [ ] negative  [ ] chest pain [ ] orthopnea [ ] palpitations [ ] murmur  Resp: [ ] negative [ ] cough [ ] shortness of breath [ ] dyspnea [ ] wheezing [ ] sputum [ ] hemoptysis  GI: [ ] negative [ ] nausea [ ] vomiting [ ] diarrhea [ ] constipation [ ] abd pain [ ] dysphagia   : [ ] negative [ ] dysuria [ ] nocturia [ ] hematuria [ ] increased urinary frequency  Musculoskeletal: [ ] negative [ ] back pain [ ] myalgias [ ] arthralgias [ ] fracture  Skin: [ ] negative [ ] rash [ ] itch  Neurological: [ ] negative [ ] headache [ ] dizziness [ ] syncope [ ] weakness [ ] numbness  Psychiatric: [ ] negative [ ] anxiety [ ] depression  Endocrine: [ ] negative [ ] diabetes [ ] thyroid problem  Hematologic/Lymphatic: [ ] negative [ ] anemia [ ] bleeding problem  Allergic/Immunologic: [ ] negative [ ] itchy eyes [ ] nasal discharge [ ] hives [ ] angioedema  [ ] All other systems negative  [x] Unable to assess ROS because ________not responsive    OBJECTIVE:  ICU Vital Signs Last 24 Hrs  T(C): 36.5 (03 Oct 2021 19:35), Max: 36.5 (03 Oct 2021 19:35)  T(F): 97.7 (03 Oct 2021 19:35), Max: 97.7 (03 Oct 2021 19:35)  HR: 96 (04 Oct 2021 00:10) (92 - 96)  BP: 112/91 (04 Oct 2021 00:10) (101/77 - 112/91)  BP(mean): --  ABP: --  ABP(mean): --  RR: 18 (04 Oct 2021 00:10) (16 - 20)  SpO2: 100% (04 Oct 2021 00:10) (100% - 100%)        CAPILLARY BLOOD GLUCOSE      POCT Blood Glucose.: 112 mg/dL (03 Oct 2021 23:32)      PHYSICAL EXAM:  Constitutional: Chronically ill appearing man, very cachectic.  EYES: Pupils pinpoint  ENT: Moist mucous membranes  Respiratory: Breath sounds are clear bilaterally, No wheezing, rales or rhonchi. Bradypneic  Cardiovascular: S1 and S2, regular rate and rhythm, no Murmurs, gallops or rubs, no JVD,    Gastrointestinal: Abdomen distended. Peritoneal drainage catheter in place  Extremities: LLE trace edema  Vascular: 2+ peripheral pulses lower ex  Neurological: Opens eyes to sternal rub, no other response  Skin: +Skin tenting  HEME: no bruises, no nose bleeds      LINES:     HOSPITAL MEDICATIONS:  Standing Meds:  rasburicase IVPB 6 milliGRAM(s) IV Intermittent once      PRN Meds:      LABS:                        11.2   8.54  )-----------( 124      ( 03 Oct 2021 21:03 )             34.8     Hgb Trend: 11.2<--  10-03    125<L>  |  88<L>  |  77<H>  ----------------------------<  123<H>  5.8<H>   |  17<L>  |  2.23<H>    Ca    8.5      03 Oct 2021 21:03  Phos  4.7     10-03  Mg     2.70     10-03    TPro  5.6<L>  /  Alb  2.6<L>  /  TBili  1.2  /  DBili  x   /  AST  88<H>  /  ALT  33  /  AlkPhos  1257<H>  10-03    Creatinine Trend: 2.23<--        Venous Blood Gas:  10-03 @ 21:03  7.30/39/37/19/58.0  VBG Lactate: 5.1      MICROBIOLOGY:     RADIOLOGY:  [ ] Reviewed and interpreted by me    EKG: NSR no Peaked T waves

## 2021-10-04 NOTE — CONSULT NOTE ADULT - SUBJECTIVE AND OBJECTIVE BOX
[facility] DIVISION OF KIDNEY DISEASES AND HYPERTENSION -- [phone]  -- INITIAL CONSULT NOTE  --------------------------------------------------------------------------------  HPI: Patient is a 67 y/o M w PMH of colorectal cancer with mets, malignant ascites c/b SBP and DVT on xarelto presented to [facility] for altered mental status. Nephrology consulted for SIRISHA and possible TLS. On review of labs on [facility] HIE/[facility], patient noted to have normal Scr on 1.02 on 7/23/21. Then Scr increased to 2.23 with K of 5.8 on 10/3/21, and then Scr worsened to 2.57 and K of 5.4 today.     Patient was seen and examined at bedside. Appears confused. Unable to obtain ROS          PAST HISTORY  --------------------------------------------------------------------------------  PAST MEDICAL & SURGICAL HISTORY:  HTN (hypertension)    Prostate cancer    Colorectal cancer    S/P TURP (transurethral resection of prostate)      FAMILY HISTORY:  No pertinent family history in first degree relatives      PAST SOCIAL HISTORY:    ALLERGIES & MEDICATIONS  --------------------------------------------------------------------------------  Allergies    No Known Allergies    Intolerances      Standing Inpatient Medications  cefTRIAXone   IVPB 1000 milliGRAM(s) IV Intermittent every 24 hours    PRN Inpatient Medications  HYDROmorphone  Injectable 0.5 milliGRAM(s) IV Push every 4 hours PRN      REVIEW OF SYSTEMS  --------------------------------------------------------------------------------  Unable to obtain ROS    VITALS/PHYSICAL EXAM  --------------------------------------------------------------------------------  T(C): 36.3 (10-04-21 @ 10:35), Max: 36.6 (10-04-21 @ 09:10)  HR: 85 (10-04-21 @ 10:35) (85 - 98)  BP: 101/84 (10-04-21 @ 10:35) (92/74 - 112/91)  RR: 16 (10-04-21 @ 10:35) (16 - 20)  SpO2: 96% (10-04-21 @ 10:35) (96% - 100%)  Wt(kg): --  Height (cm): 167.6 (10-04-21 @ 11:32)  Weight (kg): 45.4 (10-04-21 @ 11:32)  BMI (kg/m2): 16.2 (10-04-21 @ 11:32)  BSA (m2): 1.49 (10-04-21 @ 11:32)      Physical Exam:  	Gen: cachetic, ill appearing   	HEENT: dry oral mucosa  	Pulm: CTA B/L, no crackles   	CV: S1S2  	Abd: Soft, +BS   	Ext: No LE edema B/L  	Neuro: confused, somnolent but arousable   	Skin: Warm and dry    LABS/STUDIES  --------------------------------------------------------------------------------              10.0   8.65  >-----------<  110      [10-04-21 @ 06:52]              31.5     127  |  91  |  79  ----------------------------<  115      [10-04-21 @ 06:52]  5.4   |  19  |  2.57        Ca     8.4     [10-04-21 @ 06:52]      Mg     2.70     [10-04-21 @ 06:52]      Phos  4.0     [10-04-21 @ 06:52]    TPro  5.3  /  Alb  2.4  /  TBili  1.1  /  DBili  x   /  AST  76  /  ALT  32  /  AlkPhos  1153  [10-04-21 @ 06:52]        Uric acid 14.3      [10-04-21 @ 06:52]        [10-03-21 @ 21:03]  Serum Osmolality 294      [10-03-21 @ 22:48]    Creatinine Trend:  SCr 2.57 [10-04 @ 06:52]  SCr 2.23 [10-03 @ 21:03]        Iron 88, TIBC 239, %sat 37      [07-16-21 @ 10:08]  Ferritin 979      [07-16-21 @ 10:08]    HBsAg Nonreact      [07-18-21 @ 11:29]  HCV 0.09, Nonreact      [07-18-21 @ 11:29]    PARTH: titer Negative, pattern --      [07-20-21 @ 09:30]   [facility] DIVISION OF KIDNEY DISEASES AND HYPERTENSION -- [phone]  -- INITIAL CONSULT NOTE  --------------------------------------------------------------------------------  HPI: Patient is a 65 y/o M w PMH of colorectal cancer with mets, malignant ascites c/b SBP and DVT on xarelto presented to [facility] for altered mental status. Nephrology consulted for SIRISHA and possible TLS. On review of labs on [facility] HIE/[facility], patient noted to have normal Scr on 1.02 on 7/23/21. Then Scr increased to 2.23 with K of 5.8 on 10/3/21, and then Scr worsened to 2.57 and K of 5.4 today.     Patient was seen and examined at bedside. Appears confused. Unable to obtain ROS or HPI.          PAST HISTORY  --------------------------------------------------------------------------------  PAST MEDICAL & SURGICAL HISTORY:  HTN (hypertension)    Prostate cancer    Colorectal cancer    S/P TURP (transurethral resection of prostate)      FAMILY HISTORY:  No pertinent family history in first degree relatives      PAST SOCIAL HISTORY: unable to obtain    ALLERGIES & MEDICATIONS  --------------------------------------------------------------------------------  Allergies    No Known Allergies    Intolerances      Standing Inpatient Medications  cefTRIAXone   IVPB 1000 milliGRAM(s) IV Intermittent every 24 hours    PRN Inpatient Medications  HYDROmorphone  Injectable 0.5 milliGRAM(s) IV Push every 4 hours PRN      REVIEW OF SYSTEMS  --------------------------------------------------------------------------------  Unable to obtain ROS    VITALS/PHYSICAL EXAM  --------------------------------------------------------------------------------  T(C): 36.3 (10-04-21 @ 10:35), Max: 36.6 (10-04-21 @ 09:10)  HR: 85 (10-04-21 @ 10:35) (85 - 98)  BP: 101/84 (10-04-21 @ 10:35) (92/74 - 112/91)  RR: 16 (10-04-21 @ 10:35) (16 - 20)  SpO2: 96% (10-04-21 @ 10:35) (96% - 100%)  Wt(kg): --  Height (cm): 167.6 (10-04-21 @ 11:32)  Weight (kg): 45.4 (10-04-21 @ 11:32)  BMI (kg/m2): 16.2 (10-04-21 @ 11:32)  BSA (m2): 1.49 (10-04-21 @ 11:32)      Physical Exam:  	Gen: cachetic, ill appearing   	HEENT: dry oral mucosa  	Pulm: CTA B/L, no crackles   	CV: S1S2  	Abd: Soft, +BS   	Ext: No LE edema B/L  	Neuro: confused, somnolent but arousable   	Skin: Warm and dry    LABS/STUDIES  --------------------------------------------------------------------------------              10.0   8.65  >-----------<  110      [10-04-21 @ 06:52]              31.5     127  |  91  |  79  ----------------------------<  115      [10-04-21 @ 06:52]  5.4   |  19  |  2.57        Ca     8.4     [10-04-21 @ 06:52]      Mg     2.70     [10-04-21 @ 06:52]      Phos  4.0     [10-04-21 @ 06:52]    TPro  5.3  /  Alb  2.4  /  TBili  1.1  /  DBili  x   /  AST  76  /  ALT  32  /  AlkPhos  1153  [10-04-21 @ 06:52]        Uric acid 14.3      [10-04-21 @ 06:52]        [10-03-21 @ 21:03]  Serum Osmolality 294      [10-03-21 @ 22:48]    Creatinine Trend:  SCr 2.57 [10-04 @ 06:52]  SCr 2.23 [10-03 @ 21:03]        Iron 88, TIBC 239, %sat 37      [07-16-21 @ 10:08]  Ferritin 979      [07-16-21 @ 10:08]    HBsAg Nonreact      [07-18-21 @ 11:29]  HCV 0.09, Nonreact      [07-18-21 @ 11:29]    PARTH: titer Negative, pattern --      [07-20-21 @ 09:30]

## 2021-10-04 NOTE — CONSULT NOTE ADULT - ASSESSMENT
Detail Level: Detailed Quality 130: Documentation Of Current Medications In The Medical Record: Current Medications Documented Quality 431: Preventive Care And Screening: Unhealthy Alcohol Use - Screening: Patient screened for unhealthy alcohol use using a single question and scores less than 2 times per year Quality 226: Preventive Care And Screening: Tobacco Use: Screening And Cessation Intervention: Tobacco Screening not Performed for Medical Reasons JAELYN CLARKE is a 66y Male who presents with a chief complaint of AMS    Colon Cancer  - Follows with [facility]  - Diagnosed in February 2021; last treated with FOLFOX in August 2021. Metastatic disease worsening in September 2021 with discussions pending to switch to FOLFIRI + Bevacizumab.  - We will communicate with MSK and answer any oncology questions as needed    Altered Mental Status/Abnormal Electrolytes  - Patient presents with progressive decline with acute kidney injury. Electrolyte disturbances with hyponatremia, hyperkalemia, hypochloremia.  - Alkaline phosphatase elevated at 1153.  - Uric acid very elevated at 14.3. In the ED had received rasburicase and IVF.  - Colon cancer is not likely associated with tumor lysis syndrome. Would like to see nephrology input.    History of DVT  - Switched from rivaroxaban to heparin drip for now due to inability to take in PO.    We will continue to follow.    Salazar Ash MD  Hematology/Oncology. JAELYN CLARKE is a 66y Male who presents with a chief complaint of AMS    Colon Cancer  - Follows with [facility]  - Diagnosed in February 2021; last treated with FOLFOX in August 2021. Metastatic disease worsening in September 2021 with discussions pending to switch to FOLFIRI + Bevacizumab.  - We will communicate with MSK and answer any oncology questions as needed    Altered Mental Status/Abnormal Electrolytes  - Patient presents with progressive decline with acute kidney injury. Electrolyte disturbances with hyponatremia, hyperkalemia, hypochloremia.  - Alkaline phosphatase elevated at 1153.  - Uric acid very elevated at 14.3. In the ED had received rasburicase and IVF.  - Colon cancer is not likely associated with tumor lysis syndrome. Would like to see nephrology input.  - Workup for possible infection    History of DVT  - Switched from rivaroxaban to heparin drip for now due to inability to take in PO.    We will continue to follow.    Salazar Ash MD  Hematology/Oncology.

## 2021-10-04 NOTE — H&P ADULT - ASSESSMENT
67 yo M with prostate CA s/p TURP,  colorectal cancer (mets to liver first dx'd Dec 2020, started chemo in Feb 2021), malignant ascites c/b SBP with abd drain in place,  and DVTs on Xarelto admitted for metabolic encephalopathy, SIRISHA and concern for TLS

## 2021-10-04 NOTE — H&P ADULT - PROBLEM SELECTOR PLAN 4
likely multi-factorial- pre-renal from poor po intake and TLS  -CT a/p reviewed no hydro   -s/p 2L LR in ER, f/u repeat Cr. will order maintenance fluid based on repeat BMP   -hold diuretics   -avoid nephrotoxin   -monitor I&O  -nephrology consulted, f/u recs

## 2021-10-04 NOTE — CONSULT NOTE ADULT - SUBJECTIVE AND OBJECTIVE BOX
CHIEF COMPLAINT  AMS    HISTORY OF PRESENT ILLNESS  JAELYN CLARKE is a 66y Male who presents with a chief complaint of AMS    Patient is a patient with metastatic colon cancer followed at [facility] who was admitted for altered mental status. On presentation found to be progressively lethargic and having poor PO intake. Found to be hypotensive with elevated creatinine, LDH, and uric acid.    Patient was first diagnosed in February 2021 with liver biopsy showing metastatic adenocarcinoma of the colon. He was then started on FOLFOX (held in July due to hospital stay). Last chemotherapy in August 11th with subsequent doses held due to patient's weakness. In September 2021 CT showed increasing hepatic metastases and last follow-up in September it was discussed switching to FOLFIRI with bevacizumab. Family has not made decision.    PAST MEDICAL AND SURGICAL HISTORY  Essential Hypertension  Prostate Cancer  Colon Cancer    FAMILY HISTORY  None    SOCIAL HISTORY  No alcohol use    REVIEW OF SYSTEMS  A complete review of systems was performed; negative except per HPI    PHYSICAL EXAM  T(C): 36.3 (10-04-21 @ 10:35), Max: 36.6 (10-04-21 @ 09:10)  HR: 85 (10-04-21 @ 10:35) (85 - 98)  BP: 101/84 (10-04-21 @ 10:35) (92/74 - 112/91)  RR: 16 (10-04-21 @ 10:35) (16 - 20)  SpO2: 96% (10-04-21 @ 10:35) (96% - 100%)  Constitutional: lethargic, in no acute distress, cachetic  Eyes: PERRL, EOMI  HEENT: normocephalic, atraumatic  Neck: supple, non-tender  Cardiovascular: normal perfusion, 2+ peripheral edema  Respiratory: normal respiratory efforts; no increased use of accessory muscles  Gastrointestinal: soft, non-tender  Neurological: alert, oriented * 4  Skin: warm, dry, no    LABORATORY DATA                        10.0   8.65  )-----------( 110      ( 04 Oct 2021 06:52 )             31.5     10-04    127<L>  |  91<L>  |  79<H>  ----------------------------<  115<H>  5.4<H>   |  19<L>  |  2.57<H>    Ca    8.4      04 Oct 2021 06:52  Phos  4.0     10-04  Mg     2.70     10-04    TPro  5.3<L>  /  Alb  2.4<L>  /  TBili  1.1  /  DBili  x   /  AST  76<H>  /  ALT  32  /  AlkPhos  1153<H>  10-04    RADIOLOGY REVIEW  CT Abdomen/Pelvis    IMPRESSION:  Limited noncontrast study.    Redemonstrated colonic mass/neoplasm, extensive bilobar hepatic metastatic disease and moderate volumeascites. New right abdominal approach catheter.    CT Head  IMPRESSION:  There is no acute hemorrhage or midline shift.   CHIEF COMPLAINT  AMS    HISTORY OF PRESENT ILLNESS  JAELYN CLARKE is a 66y Male who presents with a chief complaint of AMS    Patient is a patient with metastatic colon cancer followed at [facility] who was admitted for altered mental status. On presentation found to be progressively lethargic and having poor PO intake. Found to be hypotensive with elevated creatinine, LDH, and uric acid.    Patient was first diagnosed in February 2021 with liver biopsy showing metastatic adenocarcinoma of the colon. He was then started on FOLFOX (held in July due to hospital stay). Last chemotherapy in August 11th with subsequent doses held due to patient's weakness. In September 2021 CT showed increasing hepatic metastases and last follow-up in September it was discussed switching to FOLFIRI with bevacizumab. Family has not made decision.    patient unable to answer questions and did not respond verbally or physically to anything.    PAST MEDICAL AND SURGICAL HISTORY  Essential Hypertension  Prostate Cancer  Colon Cancer    FAMILY HISTORY  None    SOCIAL HISTORY  No alcohol use    REVIEW OF SYSTEMS  Unable ot obtain - mental status    PHYSICAL EXAM  T(C): 36.3 (10-04-21 @ 10:35), Max: 36.6 (10-04-21 @ 09:10)  HR: 85 (10-04-21 @ 10:35) (85 - 98)  BP: 101/84 (10-04-21 @ 10:35) (92/74 - 112/91)  RR: 16 (10-04-21 @ 10:35) (16 - 20)  SpO2: 96% (10-04-21 @ 10:35) (96% - 100%)  Constitutional: lethargic, in no acute distress, cachetic  Eyes: PERRL, EOMI  HEENT: normocephalic, atraumatic  Neck: supple, non-tender  Cardiovascular: normal perfusion, 2+ peripheral edema  Respiratory: normal respiratory efforts; no increased use of accessory muscles  Gastrointestinal: soft, non-tender  Neurological: did not answer questions but redirectable  Skin: warm, dry, no    LABORATORY DATA                        10.0   8.65  )-----------( 110      ( 04 Oct 2021 06:52 )             31.5     10-04    127<L>  |  91<L>  |  79<H>  ----------------------------<  115<H>  5.4<H>   |  19<L>  |  2.57<H>    Ca    8.4      04 Oct 2021 06:52  Phos  4.0     10-04  Mg     2.70     10-04    TPro  5.3<L>  /  Alb  2.4<L>  /  TBili  1.1  /  DBili  x   /  AST  76<H>  /  ALT  32  /  AlkPhos  1153<H>  10-04    RADIOLOGY REVIEW  CT Abdomen/Pelvis    IMPRESSION:  Limited noncontrast study.    Redemonstrated colonic mass/neoplasm, extensive bilobar hepatic metastatic disease and moderate volumeascites. New right abdominal approach catheter.    CT Head  IMPRESSION:  There is no acute hemorrhage or midline shift.

## 2021-10-04 NOTE — H&P ADULT - PROBLEM SELECTOR PLAN 3
hx of SBP on cipro ppx  -ascitics fluid PMN ~200, not c/w SBP from cell count criteria. will f/u cx  -s/p ceftriaxone in ER. will continue as pt unable to take home po cipro at this time  -will consider removing more ascitic fluid for comfort once BP is stable

## 2021-10-04 NOTE — H&P ADULT - HISTORY OF PRESENT ILLNESS
HPI:  66 YOM with  colorectal cancer (mets to liver first dx'd Dec 2020, started chemo in Feb 2021), malignant ascites c/b SBP with abd drain in place,  and DVTs on Xarelto presented to the ED for altered mental status.     Pt is lethargic unable to give full history. Collaterals are not available at this time. At per chart, pt became progressively lethargic, bedbound and had poor po intake in the past week, and has missed his chemo session. Currently he denies fever, chills, chest pain, sob, N/V/D,  but endorses mild abd pain. Unable to confirm if he is taking his meds    In ER, pt is afebrile, hypotensive 90/60, satting well on RA. labs notable for elevated Cr/LDH/uric acid, as well as hypoNa/HyperK. Admitted for metabolic encephalopathy, SIRISHA and concern for TLS.         PAST MEDICAL & SURGICAL HISTORY:  HTN (hypertension)    Prostate cancer    Colorectal cancer    S/P TURP (transurethral resection of prostate)        Review of Systems:   CONSTITUTIONAL: No fever+  weight loss  EYES: No eye pain, visual disturbances, or discharge  ENMT:  No difficulty hearing, tinnitus, vertigo; No sinus or throat pain  NECK: No pain or stiffness  BREASTS: No pain, masses, or nipple discharge  RESPIRATORY: No cough No shortness of breath  CARDIOVASCULAR: No chest pain+leg swelling  GASTROINTESTINAL: +abdomina pain.   GENITOURINARY: No dysuria  NEUROLOGICAL: No headaches,  SKIN: No itching   LYMPH NODES: No enlarged glands  ENDOCRINE: No heat or cold intolerance;  MUSCULOSKELETAL:  No musclepain  PSYCHIATRIC: No depression  HEME/LYMPH: No easy bruising  ALLERGY AND IMMUNOLOGIC: No hives     Allergies    No Known Allergies    Intolerances        Social History:     FAMILY HISTORY:  No pertinent family history in first degree relatives        MEDICATIONS  (STANDING):    MEDICATIONS  (PRN):      T(C): 36.6 (10-04-21 @ 09:10), Max: 36.6 (10-04-21 @ 09:10)  HR: 86 (10-04-21 @ 09:10) (86 - 98)  BP: 92/74 (10-04-21 @ 09:10) (92/74 - 112/91)  RR: 16 (10-04-21 @ 09:10) (16 - 20)  SpO2: 98% (10-04-21 @ 09:10) (98% - 100%)  Height (cm): 165.1 (10-03-21 @ 19:35)  CAPILLARY BLOOD GLUCOSE      POCT Blood Glucose.: 106 mg/dL (04 Oct 2021 06:57)  POCT Blood Glucose.: 112 mg/dL (03 Oct 2021 23:32)    I&O's Summary      PHYSICAL EXAM:  GENERAL: NAD, lethargic. cachectic with temporal wasting   HEAD:  Atraumatic, Normocephalic  EYES: EOMI, PERRLA, conjunctiva and sclera clear  NECK: Supple, No elevated JVD  CHEST/LUNG: Clear to auscultation bilaterally; No wheeze  HEART: Regular rate and rhythm; No murmurs, rubs, or gallops  ABDOMEN: Soft, mild TTP, drainage cath in place   EXTREMITIES:  2+ pitting edema b/l LE   PSYCH: AAOx 0  NEUROLOGY: opens eyes to voice, answers yes/no questions, moving all extremities  SKIN: No rashes or lesions    LABS:                        10.0   8.65  )-----------( 110      ( 04 Oct 2021 06:52 )             31.5     10-04    127<L>  |  91<L>  |  79<H>  ----------------------------<  115<H>  5.4<H>   |  19<L>  |  2.57<H>    Ca    8.4      04 Oct 2021 06:52  Phos  4.0     10-04  Mg     2.70     10-04    TPro  5.3<L>  /  Alb  2.4<L>  /  TBili  1.1  /  DBili  x   /  AST  76<H>  /  ALT  32  /  AlkPhos  1153<H>  10-04                RADIOLOGY & ADDITIONAL TESTS:    ECG Personally Reviewed - NSR, no peaked T waves

## 2021-10-04 NOTE — H&P ADULT - PROBLEM SELECTOR PLAN 1
likely 2/2 SIRISHA and electrolyte disturbance and progressive malignancy. r/o infection  -CT head no acute pathology  -check ammonia   -treat SIRISHA, TLS as possible SBP as below  -monitor MS closely

## 2021-10-04 NOTE — H&P ADULT - PROBLEM SELECTOR PLAN 6
-CT a/p again showed colon as liver mass and ascites   -oncology consult   -consider palliative care consult   -pain control with iv Dilaudid prn -CT a/p again showed colon as liver mass and ascites   -marked elevation alk phos but normal bili- CBD dilatation on CT unchanged from prior. will order RUQ US r/o biliary obstruction, low suspicion at this time   -trend LFT  -oncology consult   -consider palliative care consult   -pain control with iv Dilaudid prn

## 2021-10-04 NOTE — SWALLOW BEDSIDE ASSESSMENT ADULT - SWALLOW EVAL: RECOMMENDED DIET
1) Oral nutrition/hydration is contraindicated at this time given pt's clinical presentation. 2) Consider Short term alternative means of nutrition. 3) Re-consult this service when pt noted with medical improvement to reassess for candidacy of an oral diet.

## 2021-10-04 NOTE — SWALLOW BEDSIDE ASSESSMENT ADULT - SWALLOW EVAL: DIAGNOSIS
Severe oral dysphagia for puree via tspn marked by absent utensil stripping and reduced awareness of utensil. Pt with absent acceptance of bolus into the oral cavity despite max SLP cues and encouragement. Pharyngeal stage of swallow not assessed due to severity of oral stage dysphagia.  Pt with difficulty maintaning adequate level of alertness throughout the assessment. Further trials deferred by SLP given pt reduced alertness and lethargy.

## 2021-10-04 NOTE — ED ADULT NURSE REASSESSMENT NOTE - NS ED NURSE REASSESS COMMENT FT1
Report received from break coverage RN. Pt appears uncomfortable, moaning and screaming. Escalated to ER MD with new pain medication order. Respirations even and unlabored. NSR on CM. Will continue to monitor.

## 2021-10-05 DIAGNOSIS — G89.3 NEOPLASM RELATED PAIN (ACUTE) (CHRONIC): ICD-10-CM

## 2021-10-05 DIAGNOSIS — Z51.5 ENCOUNTER FOR PALLIATIVE CARE: ICD-10-CM

## 2021-10-05 LAB
ALBUMIN SERPL ELPH-MCNC: 2.1 G/DL — LOW (ref 3.3–5)
ALBUMIN SERPL ELPH-MCNC: 2.2 G/DL — LOW (ref 3.3–5)
ALP SERPL-CCNC: 1027 U/L — HIGH (ref 40–120)
ALP SERPL-CCNC: 1051 U/L — HIGH (ref 40–120)
ALT FLD-CCNC: 27 U/L — SIGNIFICANT CHANGE UP (ref 4–41)
ALT FLD-CCNC: 28 U/L — SIGNIFICANT CHANGE UP (ref 4–41)
ANION GAP SERPL CALC-SCNC: 15 MMOL/L — HIGH (ref 7–14)
ANION GAP SERPL CALC-SCNC: 16 MMOL/L — HIGH (ref 7–14)
ANION GAP SERPL CALC-SCNC: 9 MMOL/L — SIGNIFICANT CHANGE UP (ref 7–14)
APPEARANCE UR: CLEAR — SIGNIFICANT CHANGE UP
APTT BLD: 41.6 SEC — HIGH (ref 27–36.3)
APTT BLD: 54.7 SEC — HIGH (ref 27–36.3)
APTT BLD: 91.8 SEC — HIGH (ref 27–36.3)
AST SERPL-CCNC: 74 U/L — HIGH (ref 4–40)
AST SERPL-CCNC: 77 U/L — HIGH (ref 4–40)
BACTERIA # UR AUTO: NEGATIVE — SIGNIFICANT CHANGE UP
BILIRUB SERPL-MCNC: 1.1 MG/DL — SIGNIFICANT CHANGE UP (ref 0.2–1.2)
BILIRUB SERPL-MCNC: 1.1 MG/DL — SIGNIFICANT CHANGE UP (ref 0.2–1.2)
BILIRUB UR-MCNC: NEGATIVE — SIGNIFICANT CHANGE UP
BUN SERPL-MCNC: 82 MG/DL — HIGH (ref 7–23)
BUN SERPL-MCNC: 83 MG/DL — HIGH (ref 7–23)
BUN SERPL-MCNC: 84 MG/DL — HIGH (ref 7–23)
CALCIUM SERPL-MCNC: 8 MG/DL — LOW (ref 8.4–10.5)
CALCIUM SERPL-MCNC: 8.2 MG/DL — LOW (ref 8.4–10.5)
CALCIUM SERPL-MCNC: 8.5 MG/DL — SIGNIFICANT CHANGE UP (ref 8.4–10.5)
CHLORIDE SERPL-SCNC: 92 MMOL/L — LOW (ref 98–107)
CHLORIDE SERPL-SCNC: 93 MMOL/L — LOW (ref 98–107)
CHLORIDE SERPL-SCNC: 96 MMOL/L — LOW (ref 98–107)
CHLORIDE UR-SCNC: <20 MMOL/L — SIGNIFICANT CHANGE UP
CO2 SERPL-SCNC: 18 MMOL/L — LOW (ref 22–31)
CO2 SERPL-SCNC: 19 MMOL/L — LOW (ref 22–31)
CO2 SERPL-SCNC: 25 MMOL/L — SIGNIFICANT CHANGE UP (ref 22–31)
COLOR SPEC: YELLOW — SIGNIFICANT CHANGE UP
CREAT ?TM UR-MCNC: 63 MG/DL — SIGNIFICANT CHANGE UP
CREAT SERPL-MCNC: 2.17 MG/DL — HIGH (ref 0.5–1.3)
CREAT SERPL-MCNC: 2.25 MG/DL — HIGH (ref 0.5–1.3)
CREAT SERPL-MCNC: 2.36 MG/DL — HIGH (ref 0.5–1.3)
DIFF PNL FLD: NEGATIVE — SIGNIFICANT CHANGE UP
EPI CELLS # UR: 0 /HPF — SIGNIFICANT CHANGE UP (ref 0–5)
GLUCOSE SERPL-MCNC: 82 MG/DL — SIGNIFICANT CHANGE UP (ref 70–99)
GLUCOSE SERPL-MCNC: 97 MG/DL — SIGNIFICANT CHANGE UP (ref 70–99)
GLUCOSE SERPL-MCNC: 98 MG/DL — SIGNIFICANT CHANGE UP (ref 70–99)
GLUCOSE UR QL: NEGATIVE — SIGNIFICANT CHANGE UP
GRAN CASTS # UR COMP ASSIST: 2 /LPF — HIGH
HCT VFR BLD CALC: 30.6 % — LOW (ref 39–50)
HGB BLD-MCNC: 9.9 G/DL — LOW (ref 13–17)
HYALINE CASTS # UR AUTO: 4 /LPF — SIGNIFICANT CHANGE UP (ref 0–7)
KETONES UR-MCNC: NEGATIVE — SIGNIFICANT CHANGE UP
LEUKOCYTE ESTERASE UR-ACNC: NEGATIVE — SIGNIFICANT CHANGE UP
MAGNESIUM SERPL-MCNC: 2.5 MG/DL — SIGNIFICANT CHANGE UP (ref 1.6–2.6)
MAGNESIUM SERPL-MCNC: 2.6 MG/DL — SIGNIFICANT CHANGE UP (ref 1.6–2.6)
MAGNESIUM SERPL-MCNC: 2.7 MG/DL — HIGH (ref 1.6–2.6)
MCHC RBC-ENTMCNC: 22.4 PG — LOW (ref 27–34)
MCHC RBC-ENTMCNC: 32.4 GM/DL — SIGNIFICANT CHANGE UP (ref 32–36)
MCV RBC AUTO: 69.2 FL — LOW (ref 80–100)
NITRITE UR-MCNC: NEGATIVE — SIGNIFICANT CHANGE UP
NRBC # BLD: 0 /100 WBCS — SIGNIFICANT CHANGE UP
NRBC # FLD: 0.02 K/UL — HIGH
PH UR: 5.5 — SIGNIFICANT CHANGE UP (ref 5–8)
PHOSPHATE SERPL-MCNC: 3.9 MG/DL — SIGNIFICANT CHANGE UP (ref 2.5–4.5)
PHOSPHATE SERPL-MCNC: 4.3 MG/DL — SIGNIFICANT CHANGE UP (ref 2.5–4.5)
PHOSPHATE SERPL-MCNC: 4.4 MG/DL — SIGNIFICANT CHANGE UP (ref 2.5–4.5)
PLATELET # BLD AUTO: 112 K/UL — LOW (ref 150–400)
POTASSIUM SERPL-MCNC: 5.1 MMOL/L — SIGNIFICANT CHANGE UP (ref 3.5–5.3)
POTASSIUM SERPL-MCNC: 5.4 MMOL/L — HIGH (ref 3.5–5.3)
POTASSIUM SERPL-MCNC: 5.8 MMOL/L — HIGH (ref 3.5–5.3)
POTASSIUM SERPL-SCNC: 5.1 MMOL/L — SIGNIFICANT CHANGE UP (ref 3.5–5.3)
POTASSIUM SERPL-SCNC: 5.4 MMOL/L — HIGH (ref 3.5–5.3)
POTASSIUM SERPL-SCNC: 5.8 MMOL/L — HIGH (ref 3.5–5.3)
PROT ?TM UR-MCNC: 39 MG/DL — SIGNIFICANT CHANGE UP
PROT SERPL-MCNC: 5 G/DL — LOW (ref 6–8.3)
PROT SERPL-MCNC: 5.2 G/DL — LOW (ref 6–8.3)
PROT UR-MCNC: ABNORMAL
PROT/CREAT UR-RTO: 0.6 RATIO — HIGH (ref 0–0.2)
RBC # BLD: 4.42 M/UL — SIGNIFICANT CHANGE UP (ref 4.2–5.8)
RBC # FLD: 20.6 % — HIGH (ref 10.3–14.5)
RBC CASTS # UR COMP ASSIST: 3 /HPF — SIGNIFICANT CHANGE UP (ref 0–4)
SODIUM SERPL-SCNC: 126 MMOL/L — LOW (ref 135–145)
SODIUM SERPL-SCNC: 127 MMOL/L — LOW (ref 135–145)
SODIUM SERPL-SCNC: 130 MMOL/L — LOW (ref 135–145)
SODIUM UR-SCNC: <20 MMOL/L — SIGNIFICANT CHANGE UP
SP GR SPEC: 1.02 — SIGNIFICANT CHANGE UP (ref 1–1.05)
URATE SERPL-MCNC: 9.1 MG/DL — HIGH (ref 3.4–8.8)
URATE UR-MCNC: 9.3 MG/DL — SIGNIFICANT CHANGE UP
UROBILINOGEN FLD QL: SIGNIFICANT CHANGE UP
UUN UR-MCNC: 585 MG/DL — SIGNIFICANT CHANGE UP
WBC # BLD: 10.05 K/UL — SIGNIFICANT CHANGE UP (ref 3.8–10.5)
WBC # FLD AUTO: 10.05 K/UL — SIGNIFICANT CHANGE UP (ref 3.8–10.5)
WBC UR QL: 1 /HPF — SIGNIFICANT CHANGE UP (ref 0–5)

## 2021-10-05 PROCEDURE — 99223 1ST HOSP IP/OBS HIGH 75: CPT | Mod: GC

## 2021-10-05 PROCEDURE — 99497 ADVNCD CARE PLAN 30 MIN: CPT | Mod: GC,25

## 2021-10-05 PROCEDURE — 93970 EXTREMITY STUDY: CPT | Mod: 26

## 2021-10-05 PROCEDURE — 99233 SBSQ HOSP IP/OBS HIGH 50: CPT

## 2021-10-05 RX ORDER — ALBUMIN HUMAN 25 %
50 VIAL (ML) INTRAVENOUS EVERY 8 HOURS
Refills: 0 | Status: COMPLETED | OUTPATIENT
Start: 2021-10-05 | End: 2021-10-06

## 2021-10-05 RX ORDER — DEXTROSE 50 % IN WATER 50 %
50 SYRINGE (ML) INTRAVENOUS ONCE
Refills: 0 | Status: COMPLETED | OUTPATIENT
Start: 2021-10-05 | End: 2021-10-05

## 2021-10-05 RX ORDER — HYDROMORPHONE HYDROCHLORIDE 2 MG/ML
0.5 INJECTION INTRAMUSCULAR; INTRAVENOUS; SUBCUTANEOUS EVERY 4 HOURS
Refills: 0 | Status: DISCONTINUED | OUTPATIENT
Start: 2021-10-05 | End: 2021-10-06

## 2021-10-05 RX ORDER — DEXTROSE 50 % IN WATER 50 %
25 SYRINGE (ML) INTRAVENOUS ONCE
Refills: 0 | Status: COMPLETED | OUTPATIENT
Start: 2021-10-05 | End: 2021-10-05

## 2021-10-05 RX ORDER — DEXTROSE 50 % IN WATER 50 %
25 SYRINGE (ML) INTRAVENOUS ONCE
Refills: 0 | Status: DISCONTINUED | OUTPATIENT
Start: 2021-10-05 | End: 2021-10-05

## 2021-10-05 RX ORDER — INSULIN HUMAN 100 [IU]/ML
5 INJECTION, SOLUTION SUBCUTANEOUS ONCE
Refills: 0 | Status: COMPLETED | OUTPATIENT
Start: 2021-10-05 | End: 2021-10-05

## 2021-10-05 RX ORDER — DEXTROSE 50 % IN WATER 50 %
25 SYRINGE (ML) INTRAVENOUS ONCE
Refills: 0 | Status: DISCONTINUED | OUTPATIENT
Start: 2021-10-05 | End: 2021-10-12

## 2021-10-05 RX ORDER — SODIUM CHLORIDE 9 MG/ML
1000 INJECTION INTRAMUSCULAR; INTRAVENOUS; SUBCUTANEOUS
Refills: 0 | Status: DISCONTINUED | OUTPATIENT
Start: 2021-10-05 | End: 2021-10-05

## 2021-10-05 RX ORDER — SODIUM CHLORIDE 9 MG/ML
1000 INJECTION, SOLUTION INTRAVENOUS
Refills: 0 | Status: DISCONTINUED | OUTPATIENT
Start: 2021-10-05 | End: 2021-10-06

## 2021-10-05 RX ORDER — DEXTROSE 50 % IN WATER 50 %
15 SYRINGE (ML) INTRAVENOUS ONCE
Refills: 0 | Status: DISCONTINUED | OUTPATIENT
Start: 2021-10-05 | End: 2021-10-12

## 2021-10-05 RX ORDER — GLUCAGON INJECTION, SOLUTION 0.5 MG/.1ML
1 INJECTION, SOLUTION SUBCUTANEOUS ONCE
Refills: 0 | Status: DISCONTINUED | OUTPATIENT
Start: 2021-10-05 | End: 2021-10-12

## 2021-10-05 RX ORDER — DEXTROSE 50 % IN WATER 50 %
12.5 SYRINGE (ML) INTRAVENOUS ONCE
Refills: 0 | Status: DISCONTINUED | OUTPATIENT
Start: 2021-10-05 | End: 2021-10-12

## 2021-10-05 RX ADMIN — HYDROMORPHONE HYDROCHLORIDE 0.5 MILLIGRAM(S): 2 INJECTION INTRAMUSCULAR; INTRAVENOUS; SUBCUTANEOUS at 13:19

## 2021-10-05 RX ADMIN — Medication 50 MILLILITER(S): at 16:46

## 2021-10-05 RX ADMIN — SODIUM CHLORIDE 100 MILLILITER(S): 9 INJECTION, SOLUTION INTRAVENOUS at 17:28

## 2021-10-05 RX ADMIN — HYDROMORPHONE HYDROCHLORIDE 0.5 MILLIGRAM(S): 2 INJECTION INTRAMUSCULAR; INTRAVENOUS; SUBCUTANEOUS at 22:59

## 2021-10-05 RX ADMIN — SODIUM CHLORIDE 100 MILLILITER(S): 9 INJECTION INTRAMUSCULAR; INTRAVENOUS; SUBCUTANEOUS at 13:29

## 2021-10-05 RX ADMIN — HYDROMORPHONE HYDROCHLORIDE 0.5 MILLIGRAM(S): 2 INJECTION INTRAMUSCULAR; INTRAVENOUS; SUBCUTANEOUS at 18:53

## 2021-10-05 RX ADMIN — SODIUM CHLORIDE 100 MILLILITER(S): 9 INJECTION INTRAMUSCULAR; INTRAVENOUS; SUBCUTANEOUS at 06:23

## 2021-10-05 RX ADMIN — HEPARIN SODIUM 900 UNIT(S)/HR: 5000 INJECTION INTRAVENOUS; SUBCUTANEOUS at 05:00

## 2021-10-05 RX ADMIN — CEFTRIAXONE 100 MILLIGRAM(S): 500 INJECTION, POWDER, FOR SOLUTION INTRAMUSCULAR; INTRAVENOUS at 23:48

## 2021-10-05 RX ADMIN — HEPARIN SODIUM 1000 UNIT(S)/HR: 5000 INJECTION INTRAVENOUS; SUBCUTANEOUS at 20:49

## 2021-10-05 RX ADMIN — HYDROMORPHONE HYDROCHLORIDE 0.5 MILLIGRAM(S): 2 INJECTION INTRAMUSCULAR; INTRAVENOUS; SUBCUTANEOUS at 02:16

## 2021-10-05 RX ADMIN — Medication 25 MILLILITER(S): at 02:20

## 2021-10-05 RX ADMIN — HEPARIN SODIUM 1000 UNIT(S)/HR: 5000 INJECTION INTRAVENOUS; SUBCUTANEOUS at 12:39

## 2021-10-05 RX ADMIN — INSULIN HUMAN 5 UNIT(S): 100 INJECTION, SOLUTION SUBCUTANEOUS at 16:47

## 2021-10-05 RX ADMIN — INSULIN HUMAN 5 UNIT(S): 100 INJECTION, SOLUTION SUBCUTANEOUS at 02:47

## 2021-10-05 RX ADMIN — SODIUM CHLORIDE 100 MILLILITER(S): 9 INJECTION, SOLUTION INTRAVENOUS at 17:25

## 2021-10-05 RX ADMIN — Medication 50 MILLILITER(S): at 21:13

## 2021-10-05 RX ADMIN — Medication 25 GRAM(S): at 07:11

## 2021-10-05 RX ADMIN — HEPARIN SODIUM 1500 UNIT(S): 5000 INJECTION INTRAVENOUS; SUBCUTANEOUS at 05:07

## 2021-10-05 RX ADMIN — HYDROMORPHONE HYDROCHLORIDE 0.5 MILLIGRAM(S): 2 INJECTION INTRAMUSCULAR; INTRAVENOUS; SUBCUTANEOUS at 06:21

## 2021-10-05 RX ADMIN — HYDROMORPHONE HYDROCHLORIDE 0.5 MILLIGRAM(S): 2 INJECTION INTRAMUSCULAR; INTRAVENOUS; SUBCUTANEOUS at 23:15

## 2021-10-05 RX ADMIN — HYDROMORPHONE HYDROCHLORIDE 0.5 MILLIGRAM(S): 2 INJECTION INTRAMUSCULAR; INTRAVENOUS; SUBCUTANEOUS at 02:46

## 2021-10-05 RX ADMIN — HEPARIN SODIUM 1500 UNIT(S): 5000 INJECTION INTRAVENOUS; SUBCUTANEOUS at 12:41

## 2021-10-05 RX ADMIN — Medication 50 MILLILITER(S): at 11:53

## 2021-10-05 NOTE — CONSULT NOTE ADULT - ASSESSMENT
65 YO M with  colorectal cancer (mets to liver first dx'd Dec 2020, started chemo in Feb 2021), malignant ascites c/b SBP with abd drain in place,  and DVTs on Xarelto presented to the ED for altered mental status.   Na to 126  CTH neg   AMS likely multifactorial 2/2 toxic metabolic encephalopathy and multiple metabolic derrangements   - palliative called  - ceftraixone for SBP hx  - SIRISHA, IVF  - DVT, on heparin drip   - check LFTs and ammonia   - pain control with Dilaudid  - DNR/I plan for home hospice   - spoke with family at bedside   - check FS, glucose control <180  - GI/DVT ppx  - Counseling on diet, exercise, and medication adherence was done  - Counseling on smoking cessation and alcohol consumption offered when appropriate.  - Pain assessed and judicious use of narcotics when appropriate was discussed.    - Stroke education given when appropriate.  - Importance of fall prevention discussed.   - Differential diagnosis and plan of care discussed with patient and/or family and primary team  - Thank you for allowing me to participate in the care of this patient. Call with questions.   Rico Tracy MD  Vascular Neurology  Office: [phone]

## 2021-10-05 NOTE — PROVIDER CONTACT NOTE (HYPOGLYCEMIA EVENT) - NS PROVIDER CONTACT BACKGROUND-HYPO
Age: 66y    Gender: Male    POCT Blood Glucose:  170 mg/dL (10-05-21 @ 07:41)  205 mg/dL (10-05-21 @ 07:26)  70 mg/dL (10-05-21 @ 06:56)  73 mg/dL (10-05-21 @ 06:52)  111 mg/dL (10-05-21 @ 03:10)  94 mg/dL (10-04-21 @ 22:53)  92 mg/dL (10-04-21 @ 18:06)      eMAR:  dextrose 50% Injectable   25 Gram(s) IV Push (10-05-21 @ 07:11)    dextrose 50% Injectable   25 milliLiter(s) IV Push (10-05-21 @ 02:20)    dextrose 50% Injectable   50 milliLiter(s) IV Push (10-04-21 @ 18:22)    insulin regular  human recombinant   5 Unit(s) IV Push (10-04-21 @ 18:19)    insulin regular  human recombinant   5 Unit(s) IV Push (10-05-21 @ 02:47)

## 2021-10-05 NOTE — CONSULT NOTE ADULT - PROBLEM SELECTOR RECOMMENDATION 2
In setting of SIRISHA. Initial K was 5.8, improved to 5.4 with insulin/d50 and IV fluids. Monitor K. Low K diet.    If any questions, please feel free to contact me     Chris Go  Nephrology Fellow  [facility] Pager: [phone]  [facility] Pager: [phone]
nephrology appreciated  continue medical management  family aware of acute renal failure - son is in med school

## 2021-10-05 NOTE — CONSULT NOTE ADULT - CONVERSATION DETAILS
spoke with son at bedside and wife, daughter over phone.  they understand pt has poor prognosis and at this time wish to hold on further dmt.  they wish to take pt home on hospice. while in hospital continue iv abx and iv fluids.  agree to dnr/dni   continue pain control.

## 2021-10-05 NOTE — CONSULT NOTE ADULT - SUBJECTIVE AND OBJECTIVE BOX
HPI:  HPI:  66 YOM with  colorectal cancer (mets to liver first dx'd Dec 2020, started chemo in Feb 2021), malignant ascites c/b SBP with abd drain in place,  and DVTs on Xarelto presented to the ED for altered mental status.     Pt is lethargic unable to give full history. Collaterals are not available at this time. At per chart, pt became progressively lethargic, bedbound and had poor po intake in the past week, and has missed his chemo session. Currently he denies fever, chills, chest pain, sob, N/V/D,  but endorses mild abd pain. Unable to confirm if he is taking his meds    In ER, pt is afebrile, hypotensive 90/60, satting well on RA. labs notable for elevated Cr/LDH/uric acid, as well as hypoNa/HyperK. Admitted for metabolic encephalopathy, SIRISHA and concern for TLS.     Pt seen in ED.  Son at bedside.  Pt min verbal but moans when touched on lower extremities.  Very cachectic.          PAST MEDICAL & SURGICAL HISTORY:  HTN (hypertension)    Prostate cancer    Colorectal cancer    S/P TURP (transurethral resection of prostate)        Review of Systems:   CONSTITUTIONAL: No fever+  weight loss  EYES: No eye pain, visual disturbances, or discharge  ENMT:  No difficulty hearing, tinnitus, vertigo; No sinus or throat pain  NECK: No pain or stiffness  BREASTS: No pain, masses, or nipple discharge  RESPIRATORY: No cough No shortness of breath  CARDIOVASCULAR: No chest pain+leg swelling  GASTROINTESTINAL: +abdomina pain.   GENITOURINARY: No dysuria  NEUROLOGICAL: No headaches,  SKIN: No itching   LYMPH NODES: No enlarged glands  ENDOCRINE: No heat or cold intolerance;  MUSCULOSKELETAL:  No musclepain  PSYCHIATRIC: No depression  HEME/LYMPH: No easy bruising  ALLERGY AND IMMUNOLOGIC: No hives     Allergies    No Known Allergies    Intolerances        Social History:     FAMILY HISTORY:  No pertinent family history in first degree relatives        MEDICATIONS  (STANDING):    MEDICATIONS  (PRN):      T(C): 36.6 (10-04-21 @ 09:10), Max: 36.6 (10-04-21 @ 09:10)  HR: 86 (10-04-21 @ 09:10) (86 - 98)  BP: 92/74 (10-04-21 @ 09:10) (92/74 - 112/91)  RR: 16 (10-04-21 @ 09:10) (16 - 20)  SpO2: 98% (10-04-21 @ 09:10) (98% - 100%)  Height (cm): 165.1 (10-03-21 @ 19:35)  CAPILLARY BLOOD GLUCOSE      POCT Blood Glucose.: 106 mg/dL (04 Oct 2021 06:57)  POCT Blood Glucose.: 112 mg/dL (03 Oct 2021 23:32)    I&O's Summary      PHYSICAL EXAM:  GENERAL: NAD, lethargic. cachectic with temporal wasting   HEAD:  Atraumatic, Normocephalic  EYES: EOMI, PERRLA, conjunctiva and sclera clear  NECK: Supple, No elevated JVD  CHEST/LUNG: Clear to auscultation bilaterally; No wheeze  HEART: Regular rate and rhythm; No murmurs, rubs, or gallops  ABDOMEN: Soft, mild TTP, drainage cath in place   EXTREMITIES:  2+ pitting edema b/l LE   PSYCH: AAOx 0  NEUROLOGY: opens eyes to voice, answers yes/no questions, moving all extremities  SKIN: No rashes or lesions    LABS:                        10.0   8.65  )-----------( 110      ( 04 Oct 2021 06:52 )             31.5     10-04    127<L>  |  91<L>  |  79<H>  ----------------------------<  115<H>  5.4<H>   |  19<L>  |  2.57<H>    Ca    8.4      04 Oct 2021 06:52  Phos  4.0     10-04  Mg     2.70     10-04    TPro  5.3<L>  /  Alb  2.4<L>  /  TBili  1.1  /  DBili  x   /  AST  76<H>  /  ALT  32  /  AlkPhos  1153<H>  10-04                RADIOLOGY & ADDITIONAL TESTS:    ECG Personally Reviewed - NSR, no peaked T waves        (04 Oct 2021 09:49)    PERTINENT PM/SXH:   HTN (hypertension)    Prostate cancer    Colorectal cancer      No significant past surgical history    S/P TURP (transurethral resection of prostate)      FAMILY HISTORY:  No pertinent family history in first degree relatives      ITEMS NOT CHECKED ARE NOT PRESENT    SOCIAL HISTORY:   Significant other/partner:  [x ]  Children:  [x ]  Mormon/Spirituality:  Substance hx:  [ ]   Tobacco hx:  [ ]   Alcohol hx: [ ]   Home Opioid hx:  [ ] I-Stop Reference No:  Living Situation: [ x]Home  [ ]Long term care  [ ]Rehab [ ]Other    ADVANCE DIRECTIVES:    DNR  MOLST  [ ]  Living Will  [ ]   DECISION MAKER(s):  [ x] Health Care Proxy(s)  [ ] Surrogate(s)  [ ] Guardian           Name(s): Phone Number(s): Darren [phone]    BASELINE (I)ADL(s) (prior to admission):  [place]: [ ]Total  [x ] Moderate [ ]Dependent    Allergies    No Known Allergies    Intolerances    MEDICATIONS  (STANDING):  albumin human 25% IVPB 50 milliLiter(s) IV Intermittent every 8 hours  cefTRIAXone   IVPB 1000 milliGRAM(s) IV Intermittent every 24 hours  dextrose 40% Gel 15 Gram(s) Oral once  dextrose 50% Injectable 25 Gram(s) IV Push once  dextrose 50% Injectable 12.5 Gram(s) IV Push once  dextrose 50% Injectable 25 Gram(s) IV Push once  dextrose 50% Injectable 25 milliLiter(s) IV Push once  glucagon  Injectable 1 milliGRAM(s) IntraMuscular once  heparin  Infusion.  Unit(s)/Hr (8 mL/Hr) IV Continuous <Continuous>  insulin regular  human recombinant 5 Unit(s) IV Push once  sodium chloride 0.9%. 1000 milliLiter(s) (100 mL/Hr) IV Continuous <Continuous>    MEDICATIONS  (PRN):  heparin   Injectable 3500 Unit(s) IV Push every 6 hours PRN For aPTT less than 40  heparin   Injectable 1500 Unit(s) IV Push every 6 hours PRN For aPTT between 40 - 57  HYDROmorphone  Injectable 0.5 milliGRAM(s) IV Push every 4 hours PRN Severe Pain (7 - 10)    PRESENT SYMPTOMS: [ x]Unable to obtain due to poor mentation   Source if other than patient:  [ ]Family   [ ]Team     Pain: [ x] yes [ ] no  QOL impact - min able to move  Location -       all over             Aggravating factors - movement  Quality - dull  Radiation - none  Timing- comes and goes  Severity (0-10 scale): 8/10  Minimal acceptable level (0-10 scale): 3/10    PAIN AD Score:     http://geriatrictoolkit.[place].[facility]/cog/painad.pdf (press ctrl +  left click to view)    Dyspnea:                           [ x]Mild [ ]Moderate [ ]Severe  Anxiety:                             [ ]Mild [ ]Moderate [ ]Severe  Fatigue:                             [ ]Mild [ ]Moderate [x ]Severe  Nausea:                             [ ]Mild [ ]Moderate [ ]Severe  Loss of appetite:              [ ]Mild [ ]Moderate [x ]Severe  Constipation:                    [ ]Mild [x ]Moderate [ ]Severe    Other Symptoms:  [x ]All other review of systems negative     Karnofsky Performance Score/Palliative Performance Status Version 2:   40      %    http://npcrc.org/files/news/palliative_performance_scale_ppsv2.pdf  PHYSICAL EXAM:  Vital Signs Last 24 Hrs  T(C): 36.3 (05 Oct 2021 08:21), Max: 36.4 (04 Oct 2021 16:13)  T(F): 97.3 (05 Oct 2021 08:21), Max: 97.6 (04 Oct 2021 16:13)  HR: 85 (05 Oct 2021 12:55) (78 - 97)  BP: 100/71 (05 Oct 2021 12:55) (93/68 - 101/81)  BP(mean): --  RR: 13 (05 Oct 2021 12:55) (11 - 17)  SpO2: 98% (05 Oct 2021 12:55) (96% - 100%) I&O's Summary  GENERAL:  [x ]Alert  [ ]Oriented x   [x ]Lethargic  [ ]Cachexia  [ ]Unarousable  [ ]Verbal  [ ]Non-Verbal  Behavioral:   [ ] Anxiety  [x ] Delirium [ ] Agitation [ ] Other  HEENT:  [ ]Normal   [x ]Dry mouth   [ ]ET Tube/Trach  [ ]Oral lesions  PULMONARY:   [x ]Clear [ ]Tachypnea  [ ]Audible excessive secretions   [ ]Rhonchi        [ ]Right [ ]Left [ ]Bilateral  [ ]Crackles        [ ]Right [ ]Left [ ]Bilateral  [ ]Wheezing     [ ]Right [ ]Left [ ]Bilateral  CARDIOVASCULAR:    [x ]Regular [ ]Irregular [ ]Tachy  [ ]Freddie [ ]Murmur [ ]Other  GASTROINTESTINAL:  [x ]Soft  [x ]Distended   [x ]+BS  [ x]Non tender [ ]Tender  [ ]PEG [ ]OGT/ NGT  Last BM: GENITOURINARY: +pigtail catheter  [ ]Normal [ x] Incontinent   [ ]Oliguria/Anuria   [ ]Alexandre  MUSCULOSKELETAL:   [ ]Normal   [ x]Weakness  [ ]Bed/Wheelchair bound [x ]Edema  NEUROLOGIC:   [ ]No focal deficits  [x ] Cognitive impairment  [ ] Dysphagia [ ]Dysarthria [ ] Paresis [ ]Other   SKIN:   [x ]Normal   [ ]Pressure ulcer(s)  [ ]Rash    CRITICAL CARE:  [ ] Shock Present  [ ]Septic [ ]Cardiogenic [ ]Neurologic [ ]Hypovolemic  [ ]  Vasopressors [ ]  Inotropes   [ ] Respiratory failure present [ ] mechanical ventilation [ ] non-invasive ventilatory support [ ] High flow  [ ] Acute  [ ] Chronic [ ] Hypoxic  [ ] Hypercarbic [ ] Other  [ ] Other organ failure     LABS:                        9.9    10.05 )-----------( 112      ( 05 Oct 2021 04:32 )             30.6   10-05    127<L>  |  93<L>  |  84<H>  ----------------------------<  82  5.4<H>   |  18<L>  |  2.25<H>    Ca    8.2<L>      05 Oct 2021 11:58  Phos  4.4     10-05  Mg     2.70     10-05    TPro  5.2<L>  /  Alb  2.2<L>  /  TBili  1.1  /  DBili  x   /  AST  77<H>  /  ALT  28  /  AlkPhos  1051<H>  10-05  PTT - ( 05 Oct 2021 11:58 )  PTT:54.7 sec      RADIOLOGY & ADDITIONAL STUDIES:    PROTEIN CALORIE MALNUTRITION PRESENT: [ ] Yes [ ] No  [ ] PPSV2 < or = to 30% [ ] significant weight loss  [ ] poor nutritional intake [ ] catabolic state [ ] anasarca     Artificial Nutrition [ ]     REFERRALS:   [ ]Chaplaincy  [ ] Hospice  [ ]Child Life  [ ]Social Work  [ ]Case management [ ]Holistic Therapy     Goals of Care Document:

## 2021-10-05 NOTE — CONSULT NOTE ADULT - ASSESSMENT
66 YOM with  colorectal cancer (mets to liver first dx'd Dec 2020, started chemo in Feb 2021), malignant ascites c/b SBP with abd drain in place,  and DVTs on Xarelto presented to the ED for altered mental status.

## 2021-10-05 NOTE — PROVIDER CONTACT NOTE (OTHER) - ACTION/TREATMENT ORDERED:
provider made aware. no new orders at this time provider made aware. provider states to recheck BP in 1 hour with manual BP cuff

## 2021-10-05 NOTE — CONSULT NOTE ADULT - PROBLEM SELECTOR RECOMMENDATION 9
Pt with hemodynamically mediated SIRISHA in setting of volume depletion, NSAIDs (got toradol IV here) and possible TLS. Exact duration of SIRISHA however unknown. Patient noted to have normal Scr on 1.02 on 7/23/21. Then Scr increased to 2.23 with K of 5.8 on 10/3/21, and then Scr worsened to 2.57 and K of 5.4 today. CT abd done on this admission with no hydro. Noted to have elevated uric acid of 19.9, . Got 1 dose of rasburicase. Last phos was 4, and calcium 9.7 (corrected). Got 2L LR bolus in ED.    Recommend to send UA, urine electrolytes (urine Na, Cl and urea) and spot urine TP/CR. Start on NS at 100 cc/hr. Monitor TLS labs. Monitor labs and urine output. Avoid NSAIDs, ACEI/ARBS, RCA and nephrotoxins. Dose medications as per eGFR.
iv abx as tolerated  pt also receiving albumin   would need to be discontinued upon discharge  drain ascites as tolerated

## 2021-10-05 NOTE — PROGRESS NOTE ADULT - ASSESSMENT
JAELYN CLARKE is a 66y Male who presents with a chief complaint of AMS    Colon Cancer  - Follows with [facility]  - Diagnosed in February 2021; last treated with FOLFOX in August 2021. Metastatic disease worsening in September 2021 with discussions pending to switch to FOLFIRI + Bevacizumab.  - We will communicate with [facility] and answer any oncology questions as needed    Altered Mental Status/Abnormal Electrolytes  - Patient presents with progressive decline with acute kidney injury. Electrolyte disturbances with hyponatremia, hyperkalemia, hypochloremia.  - Alkaline phosphatase elevated at 1153.  - Uric acid very elevated at 14.3. In the ED had received rasburicase and IVF.  - Colon cancer is not likely associated with tumor lysis syndrome. Would like to see nephrology input.  -  Uric acid improved 9.1  - cr 2.36   - LDH slight elevated hapto normal   - r/o infectious causes   - neuro consult     History of DVT  - Switched from rivaroxaban to heparin drip for now due to inability to take in PO.    John Vogel MD  Hematology Oncology   O:   C: [phone]

## 2021-10-05 NOTE — CONSULT NOTE ADULT - PROBLEM SELECTOR RECOMMENDATION 4
continue iv dilaudid 0.5mg q 2 hours prn   bowel regimen  can transition to liquid dilaudid po 2mg prn upon discharge to hospice

## 2021-10-05 NOTE — CONSULT NOTE ADULT - SUBJECTIVE AND OBJECTIVE BOX
Neurology Consult    Reason for Consult: Patient is a 66y old  Male who presents with a chief complaint of AMS (05 Oct 2021 13:21)      HPI:  HPI:  67 YO M with  colorectal cancer (mets to liver first dx'd Dec 2020, started chemo in 2021), malignant ascites c/b SBP with abd drain in place,  and DVTs on Xarelto presented to the ED for altered mental status.     Pt is lethargic unable to give full history. Collaterals are not available at this time. At per chart, pt became progressively lethargic, bedbound and had poor po intake in the past week, and has missed his chemo session. Currently he denies fever, chills, chest pain, sob, N/V/D,  but endorses mild abd pain. Unable to confirm if he is taking his meds    In ER, pt is afebrile, hypotensive 90/60, satting well on RA. labs notable for elevated Cr/LDH/uric acid, as well as hypoNa/HyperK. Admitted for metabolic encephalopathy, SIRISHA and concern for TLS.          PAST MEDICAL & SURGICAL HISTORY:  HTN (hypertension)    Prostate cancer    Colorectal cancer    S/P TURP (transurethral resection of prostate)        Allergies: Allergies    No Known Allergies    Intolerances        Social History: Denies toxic habits including tobacco, ETOH or illicit drugs.    Family History: FAMILY HISTORY:  No pertinent family history in first degree relatives    . No family history of strokes    Medications: MEDICATIONS  (STANDING):  albumin human 25% IVPB 50 milliLiter(s) IV Intermittent every 8 hours  cefTRIAXone   IVPB 1000 milliGRAM(s) IV Intermittent every 24 hours  dextrose 40% Gel 15 Gram(s) Oral once  dextrose 5% + sodium chloride 0.9%. 1000 milliLiter(s) (100 mL/Hr) IV Continuous <Continuous>  dextrose 50% Injectable 25 Gram(s) IV Push once  dextrose 50% Injectable 12.5 Gram(s) IV Push once  dextrose 50% Injectable 25 Gram(s) IV Push once  glucagon  Injectable 1 milliGRAM(s) IntraMuscular once  heparin  Infusion.  Unit(s)/Hr (8 mL/Hr) IV Continuous <Continuous>    MEDICATIONS  (PRN):  heparin   Injectable 3500 Unit(s) IV Push every 6 hours PRN For aPTT less than 40  heparin   Injectable 1500 Unit(s) IV Push every 6 hours PRN For aPTT between 40 - 57  HYDROmorphone  Injectable 0.5 milliGRAM(s) IV Push every 4 hours PRN Severe Pain (7 - 10)      Review of Systems:  unable given mental status    Vitals:  Vital Signs Last 24 Hrs  T(C): 36.3 (05 Oct 2021 08:21), Max: 36.3 (04 Oct 2021 20:34)  T(F): 97.3 (05 Oct 2021 08:21), Max: 97.3 (04 Oct 2021 20:34)  HR: 85 (05 Oct 2021 12:55) (78 - 93)  BP: 100/71 (05 Oct 2021 12:55) (93/68 - 101/81)  BP(mean): --  RR: 13 (05 Oct 2021 12:55) (11 - 17)  SpO2: 98% (05 Oct 2021 12:55) (98% - 100%)    General Exam:   General Appearance: Appropriately dressed and in no acute distress       Head: Normocephalic, atraumatic and no dysmorphic features  Ear, Nose, and Throat: Moist mucous membranes  CVS: S1S2+  Resp: No SOB, no wheeze or rhonchi  GI: soft NT/ND  Extremities: No edema or cyanosis  Skin: No bruises or rashes     Neurological Exam:  Mental Status: lethargic, minimal verbal, not following   Cranial Nerves: PERRL, EOMI, VFFC, sensation V1-V3 intact,  no obvious facial asymmetry, equal elevation of palate, scm/trap 5/5, tongue is midline on protrusion. no obvious papilledema on fundoscopic exam. hearing is grossly intact.   Motor: LIZARRAGA but generalized weakness   Sensation: withdraw to mild noxious   Reflexes: 1+ throughout at biceps, brachioradialis, triceps, patellars and ankles bilaterally and equal. No clonus. R toe and L toe were both downgoing.  Coordination: unable   Gait: unable     Data/Labs/Imaging which I personally reviewed.     Labs:     CBC Full  -  ( 05 Oct 2021 04:32 )  WBC Count : 10.05 K/uL  RBC Count : 4.42 M/uL  Hemoglobin : 9.9 g/dL  Hematocrit : 30.6 %  Platelet Count - Automated : 112 K/uL  Mean Cell Volume : 69.2 fL  Mean Cell Hemoglobin : 22.4 pg  Mean Cell Hemoglobin Concentration : 32.4 gm/dL  Auto Neutrophil # : x  Auto Lymphocyte # : x  Auto Monocyte # : x  Auto Eosinophil # : x  Auto Basophil # : x  Auto Neutrophil % : x  Auto Lymphocyte % : x  Auto Monocyte % : x  Auto Eosinophil % : x  Auto Basophil % : x    10    127<L>  |  93<L>  |  84<H>  ----------------------------<  82  5.4<H>   |  18<L>  |  2.25<H>    Ca    8.2<L>      05 Oct 2021 11:58  Phos  4.4     10  Mg     2.70     10-05    TPro  5.2<L>  /  Alb  2.2<L>  /  TBili  1.1  /  DBili  x   /  AST  77<H>  /  ALT  28  /  AlkPhos  1051<H>  10-05    LIVER FUNCTIONS - ( 05 Oct 2021 11:58 )  Alb: 2.2 g/dL / Pro: 5.2 g/dL / ALK PHOS: 1051 U/L / ALT: 28 U/L / AST: 77 U/L / GGT: x           PTT - ( 05 Oct 2021 11:58 )  PTT:54.7 sec  Urinalysis Basic - ( 05 Oct 2021 11:58 )    Color: Yellow / Appearance: Clear / S.020 / pH: x  Gluc: x / Ketone: Negative  / Bili: Negative / Urobili: <2 mg/dL   Blood: x / Protein: 30 mg/dL / Nitrite: Negative   Leuk Esterase: Negative / RBC: 3 /HPF / WBC 1 /HPF   Sq Epi: x / Non Sq Epi: 0 /HPF / Bacteria: Negative      < from: CT Head No Cont (10.04.21 @ 06:01) >    EXAM:  CT BRAIN        PROCEDURE DATE:  Oct  4 2021         INTERPRETATION:  CLINICAL INDICATIONS: Altered mental status    Technique: CT of the head was performed.    Multiple contiguous axial images were acquired from the skullbase to the vertex without the administration of intravenous contrast.  Coronal and sagittal reformations were made.    COMPARISON: None available.    FINDINGS:  Prominence of the bifrontal axial space is seen which could be due to increased atrophy versus chronic subdural hematoma/hygroma. This finding measures approximately 0.6 cm in largest diameter bilaterally. Parenchymal volume loss and chronic microvascular ischemic changes are identified. There are no areas of abnormal attenuation within the brain parenchyma. There is no intraparenchymal hematoma, mass effect or midline shift. The basal cisterns are patent. No abnormal extra-axial fluid collections are present    The calvarium is intact. The visualized intraorbital compartments, paranasal sinuses and mastoid complexes appear free of acute disease.    IMPRESSION:  There is no acute hemorrhage or midline shift.    --- End of Report ---            MADI GILLILAND MD; Resident Radiologist  This document has been electronically signed.  NATLAY BAUTISTA MD; Attending Radiologist  This document has been electronically signed. Oct  4 2021  9:14AM    < end of copied text >

## 2021-10-05 NOTE — PROVIDER CONTACT NOTE (HYPOGLYCEMIA EVENT) - NS PROVIDER CONTACT SITUATION-HYPO
Blood sugar was 73, recheck was 70. ACP made aware. D50 25 g given IVP. Then blood sugar was 205 then 15 mins later it was 170

## 2021-10-05 NOTE — PROGRESS NOTE ADULT - ASSESSMENT
65 yo M with prostate CA s/p TURP,  colorectal cancer (mets to liver first dx'd Dec 2020, started chemo in Feb 2021), malignant ascites c/b SBP with abd drain in place,  and DVTs on Xarelto admitted for metabolic encephalopathy, SIRISHA and concern for TLS

## 2021-10-05 NOTE — CONSULT NOTE ADULT - PROBLEM SELECTOR RECOMMENDATION 3
recently diagnosed 2/21  re[place] [facility] [place] and was planning on changing regimens  now with significant decline in function, mental status  goal is to hold on dmt for now

## 2021-10-06 LAB
-  AMIKACIN: SIGNIFICANT CHANGE UP
-  AZTREONAM: SIGNIFICANT CHANGE UP
-  CEFEPIME: SIGNIFICANT CHANGE UP
-  CEFTRIAXONE: SIGNIFICANT CHANGE UP
-  CIPROFLOXACIN: SIGNIFICANT CHANGE UP
-  GENTAMICIN: SIGNIFICANT CHANGE UP
-  LEVOFLOXACIN: SIGNIFICANT CHANGE UP
-  MEROPENEM: SIGNIFICANT CHANGE UP
-  PIPERACILLIN/TAZOBACTAM: SIGNIFICANT CHANGE UP
-  TOBRAMYCIN: SIGNIFICANT CHANGE UP
-  TRIMETHOPRIM/SULFAMETHOXAZOLE: SIGNIFICANT CHANGE UP
AMMONIA BLD-MCNC: 86 UMOL/L — HIGH (ref 11–55)
ANION GAP SERPL CALC-SCNC: 18 MMOL/L — HIGH (ref 7–14)
APTT BLD: 84.1 SEC — HIGH (ref 27–36.3)
APTT BLD: 84.6 SEC — HIGH (ref 27–36.3)
APTT BLD: >200 SEC — CRITICAL HIGH (ref 27–36.3)
BASE EXCESS BLDV CALC-SCNC: -5.8 MMOL/L — LOW (ref -2–3)
BLOOD GAS VENOUS COMPREHENSIVE RESULT: SIGNIFICANT CHANGE UP
BUN SERPL-MCNC: 76 MG/DL — HIGH (ref 7–23)
CALCIUM SERPL-MCNC: 8.3 MG/DL — LOW (ref 8.4–10.5)
CHLORIDE BLDV-SCNC: 98 MMOL/L — SIGNIFICANT CHANGE UP (ref 96–108)
CHLORIDE SERPL-SCNC: 95 MMOL/L — LOW (ref 98–107)
CO2 BLDV-SCNC: 21.5 MMOL/L — LOW (ref 22–26)
CO2 SERPL-SCNC: 18 MMOL/L — LOW (ref 22–31)
CREAT SERPL-MCNC: 2.11 MG/DL — HIGH (ref 0.5–1.3)
GAS PNL BLDV: 125 MMOL/L — LOW (ref 136–145)
GLUCOSE BLDV-MCNC: 87 MG/DL — SIGNIFICANT CHANGE UP (ref 70–99)
GLUCOSE SERPL-MCNC: 100 MG/DL — HIGH (ref 70–99)
HCO3 BLDV-SCNC: 20 MMOL/L — LOW (ref 22–29)
HCT VFR BLD CALC: 28.1 % — LOW (ref 39–50)
HCT VFR BLDA CALC: 28 % — LOW (ref 39–51)
HGB BLD CALC-MCNC: 9.2 G/DL — LOW (ref 13–17)
HGB BLD-MCNC: 8.9 G/DL — LOW (ref 13–17)
LACTATE BLDV-MCNC: 3.4 MMOL/L — HIGH (ref 0.5–2)
MAGNESIUM SERPL-MCNC: 2.5 MG/DL — SIGNIFICANT CHANGE UP (ref 1.6–2.6)
MCHC RBC-ENTMCNC: 22.4 PG — LOW (ref 27–34)
MCHC RBC-ENTMCNC: 31.7 GM/DL — LOW (ref 32–36)
MCV RBC AUTO: 70.6 FL — LOW (ref 80–100)
METHOD TYPE: SIGNIFICANT CHANGE UP
NRBC # BLD: 0 /100 WBCS — SIGNIFICANT CHANGE UP
NRBC # FLD: 0 K/UL — SIGNIFICANT CHANGE UP
PCO2 BLDV: 41 MMHG — LOW (ref 42–55)
PH BLDV: 7.3 — LOW (ref 7.32–7.43)
PHOSPHATE SERPL-MCNC: 3.9 MG/DL — SIGNIFICANT CHANGE UP (ref 2.5–4.5)
PLATELET # BLD AUTO: 96 K/UL — LOW (ref 150–400)
PO2 BLDV: 47 MMHG — SIGNIFICANT CHANGE UP
POTASSIUM BLDV-SCNC: 4.7 MMOL/L — SIGNIFICANT CHANGE UP (ref 3.5–5.1)
POTASSIUM SERPL-MCNC: 4.9 MMOL/L — SIGNIFICANT CHANGE UP (ref 3.5–5.3)
POTASSIUM SERPL-SCNC: 4.9 MMOL/L — SIGNIFICANT CHANGE UP (ref 3.5–5.3)
RBC # BLD: 3.98 M/UL — LOW (ref 4.2–5.8)
RBC # FLD: 20.5 % — HIGH (ref 10.3–14.5)
SAO2 % BLDV: 79.2 % — SIGNIFICANT CHANGE UP
SODIUM SERPL-SCNC: 131 MMOL/L — LOW (ref 135–145)
URATE SERPL-MCNC: 5.6 MG/DL — SIGNIFICANT CHANGE UP (ref 3.4–8.8)
WBC # BLD: 7.7 K/UL — SIGNIFICANT CHANGE UP (ref 3.8–10.5)
WBC # FLD AUTO: 7.7 K/UL — SIGNIFICANT CHANGE UP (ref 3.8–10.5)

## 2021-10-06 PROCEDURE — 99222 1ST HOSP IP/OBS MODERATE 55: CPT | Mod: GC

## 2021-10-06 PROCEDURE — 99233 SBSQ HOSP IP/OBS HIGH 50: CPT | Mod: GC

## 2021-10-06 RX ORDER — ACETAMINOPHEN 500 MG
1000 TABLET ORAL ONCE
Refills: 0 | Status: DISCONTINUED | OUTPATIENT
Start: 2021-10-06 | End: 2021-10-06

## 2021-10-06 RX ORDER — HYDROMORPHONE HYDROCHLORIDE 2 MG/ML
0.2 INJECTION INTRAMUSCULAR; INTRAVENOUS; SUBCUTANEOUS
Refills: 0 | Status: DISCONTINUED | OUTPATIENT
Start: 2021-10-06 | End: 2021-10-07

## 2021-10-06 RX ORDER — SENNA PLUS 8.6 MG/1
2 TABLET ORAL AT BEDTIME
Refills: 0 | Status: DISCONTINUED | OUTPATIENT
Start: 2021-10-06 | End: 2021-10-08

## 2021-10-06 RX ORDER — ACETAMINOPHEN 500 MG
650 TABLET ORAL ONCE
Refills: 0 | Status: COMPLETED | OUTPATIENT
Start: 2021-10-06 | End: 2021-10-06

## 2021-10-06 RX ORDER — HYDROMORPHONE HYDROCHLORIDE 2 MG/ML
0.5 INJECTION INTRAMUSCULAR; INTRAVENOUS; SUBCUTANEOUS EVERY 4 HOURS
Refills: 0 | Status: DISCONTINUED | OUTPATIENT
Start: 2021-10-06 | End: 2021-10-06

## 2021-10-06 RX ORDER — LIDOCAINE HCL 20 MG/ML
10 VIAL (ML) INJECTION ONCE
Refills: 0 | Status: DISCONTINUED | OUTPATIENT
Start: 2021-10-06 | End: 2021-10-12

## 2021-10-06 RX ORDER — CIPROFLOXACIN LACTATE 400MG/40ML
400 VIAL (ML) INTRAVENOUS EVERY 12 HOURS
Refills: 0 | Status: DISCONTINUED | OUTPATIENT
Start: 2021-10-06 | End: 2021-10-07

## 2021-10-06 RX ORDER — PANTOPRAZOLE SODIUM 20 MG/1
40 TABLET, DELAYED RELEASE ORAL DAILY
Refills: 0 | Status: DISCONTINUED | OUTPATIENT
Start: 2021-10-06 | End: 2021-10-12

## 2021-10-06 RX ORDER — SUCRALFATE 1 G
1 TABLET ORAL
Refills: 0 | Status: DISCONTINUED | OUTPATIENT
Start: 2021-10-06 | End: 2021-10-12

## 2021-10-06 RX ORDER — SODIUM BICARBONATE 1 MEQ/ML
0.08 SYRINGE (ML) INTRAVENOUS
Qty: 75 | Refills: 0 | Status: DISCONTINUED | OUTPATIENT
Start: 2021-10-06 | End: 2021-10-09

## 2021-10-06 RX ORDER — SODIUM CHLORIDE 9 MG/ML
1000 INJECTION, SOLUTION INTRAVENOUS
Refills: 0 | Status: DISCONTINUED | OUTPATIENT
Start: 2021-10-06 | End: 2021-10-09

## 2021-10-06 RX ADMIN — HYDROMORPHONE HYDROCHLORIDE 0.5 MILLIGRAM(S): 2 INJECTION INTRAMUSCULAR; INTRAVENOUS; SUBCUTANEOUS at 09:11

## 2021-10-06 RX ADMIN — HEPARIN SODIUM 900 UNIT(S)/HR: 5000 INJECTION INTRAVENOUS; SUBCUTANEOUS at 12:24

## 2021-10-06 RX ADMIN — HYDROMORPHONE HYDROCHLORIDE 0.5 MILLIGRAM(S): 2 INJECTION INTRAMUSCULAR; INTRAVENOUS; SUBCUTANEOUS at 09:30

## 2021-10-06 RX ADMIN — HEPARIN SODIUM 900 UNIT(S)/HR: 5000 INJECTION INTRAVENOUS; SUBCUTANEOUS at 04:35

## 2021-10-06 RX ADMIN — PANTOPRAZOLE SODIUM 40 MILLIGRAM(S): 20 TABLET, DELAYED RELEASE ORAL at 12:28

## 2021-10-06 RX ADMIN — Medication 50 MEQ/KG/HR: at 19:24

## 2021-10-06 RX ADMIN — Medication 260 MILLIGRAM(S): at 04:36

## 2021-10-06 RX ADMIN — SODIUM CHLORIDE 100 MILLILITER(S): 9 INJECTION, SOLUTION INTRAVENOUS at 18:17

## 2021-10-06 RX ADMIN — Medication 650 MILLIGRAM(S): at 04:55

## 2021-10-06 RX ADMIN — SODIUM CHLORIDE 100 MILLILITER(S): 9 INJECTION, SOLUTION INTRAVENOUS at 03:45

## 2021-10-06 RX ADMIN — HYDROMORPHONE HYDROCHLORIDE 0.2 MILLIGRAM(S): 2 INJECTION INTRAMUSCULAR; INTRAVENOUS; SUBCUTANEOUS at 18:54

## 2021-10-06 RX ADMIN — Medication 50 MILLILITER(S): at 06:55

## 2021-10-06 RX ADMIN — Medication 200 MILLIGRAM(S): at 18:32

## 2021-10-06 RX ADMIN — HEPARIN SODIUM 900 UNIT(S)/HR: 5000 INJECTION INTRAVENOUS; SUBCUTANEOUS at 18:54

## 2021-10-06 RX ADMIN — HYDROMORPHONE HYDROCHLORIDE 0.2 MILLIGRAM(S): 2 INJECTION INTRAMUSCULAR; INTRAVENOUS; SUBCUTANEOUS at 18:31

## 2021-10-06 RX ADMIN — HEPARIN SODIUM 0 UNIT(S)/HR: 5000 INJECTION INTRAVENOUS; SUBCUTANEOUS at 03:29

## 2021-10-06 NOTE — CHART NOTE - NSCHARTNOTEFT_GEN_A_CORE
Pt w/ Bladder Scan >500cc, Straight Cath yielding 200cc clear yellow urine, no sediment. Likely falsely high reading 2/2 Ascites and Pt not retaining urine. Will continue to monitor Urine output, Creatinine.

## 2021-10-06 NOTE — PROGRESS NOTE ADULT - ASSESSMENT
67 YO M with  colorectal cancer (mets to liver first dx'd Dec 2020, started chemo in Feb 2021), malignant ascites c/b SBP with abd drain in place,  and DVTs on Xarelto presented to the ED for altered mental status.   Na to 126  CTH neg   AMS likely multifactorial 2/2 toxic metabolic encephalopathy and multiple metabolic derrangements   - palliative called  - abx for infection per ID now on cipro   - heme/onc recs apprecaited  - ID recs appreciated   - SIRISHA, IVF  - DVT, on heparin drip   - check LFTs and ammonia   - pain control with Dilaudid  - DNR/I plan for home hospice   - spoke with family at bedside   - check FS, glucose control <180  - GI/DVT ppx  - Counseling on diet, exercise, and medication adherence was done  - Counseling on smoking cessation and alcohol consumption offered when appropriate.  - Pain assessed and judicious use of narcotics when appropriate was discussed.    - Stroke education given when appropriate.  - Importance of fall prevention discussed.   - Differential diagnosis and plan of care discussed with patient and/or family and primary team  - Thank you for allowing me to participate in the care of this patient. Call with questions.   Rico Tracy MD  Vascular Neurology  [phone]

## 2021-10-06 NOTE — CONSULT NOTE ADULT - ASSESSMENT
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    #Chryseobacterium Indolgenes  -this is a gram negative bacteria, and nosocomial infections have been related to indwelling devices (ie. abdominal drain).   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    #Chryseobacterium Indolgenes  -this is a gram negative bacteria, and nosocomial infections have been related to indwelling devices (ie. abdominal drain).   -Few cases were reported where patients without bloodstream infections did not require removal of any drains, and their conditions improved with antibiotics  -would not remove abdominal drain  -bacteria resistant to levofloxacin, ciprofloxacin, bactrim, would discontinue ceftriaxone and start levofloxacin 750 IV qD for 5-7 days. This would also serve as SBP ppx as well. Please get QTC prior to starting.     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    #Chryseobacterium Indolgenes  -this is a gram negative bacteria, and nosocomial infections have been related to indwelling devices (ie. abdominal drain).   -Few cases were reported where patients without bloodstream infections did not require removal of any drains, and their conditions improved with antibiotics  -Patient's chryseobacterium is likely colonizing the catheter, but not causing an infectious state, and so we do not recommend removal of abdominal drain  -bacteria resistant to levofloxacin, ciprofloxacin, bactrim, would discontinue ceftriaxone and start ciprofloxacin 400 IV q12h for 5-7 days. This would also serve as SBP ppx as well. Please get QTC prior to starting.     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 abdominal drain).   -Few cases were reported where patients without bloodstream infections did not require removal of any drains, and their conditions improved with antibiotics  -Patient's chryseobacterium is likely colonizing the catheter, but not causing an infectious state, and so we do not recommend removal of abdominal drain  -bacteria resistant to levofloxacin, ciprofloxacin, bactrim, would discontinue ceftriaxone and start ciprofloxacin 400 IV q12h for 5-7 days. This would also serve as SBP ppx as well. Please get QTC prior to starting.     *Plan not final until attending attestation     Josué Levine, PGY3  Infectious Disease Consult Resident

## 2021-10-06 NOTE — DIETITIAN INITIAL EVALUATION ADULT. - MALNUTRITION
severe protein calorie malnutrition related to chronic illness. Pt strongly meets criteria for severe protein calorie malnutrition

## 2021-10-06 NOTE — DIETITIAN INITIAL EVALUATION ADULT. - ORAL INTAKE PTA/DIET HISTORY
Pt seen at bedside with his family around who provided the collateral.  As per son the goal is Hospice care at home.  Pt has not been able to eat anything due to change in his mental status pta.

## 2021-10-06 NOTE — DIETITIAN INITIAL EVALUATION ADULT. - PROBLEM SELECTOR PLAN 2
elevated uric acid, LDH, K, phos concerning for TLS  -received 2L LR, Rasburicase, calcium gluconate/insulin/D50 in ER  -monitor TLS labs (BMP, LDH, Uric acid) q8  -will consider bicarb gtt if remains acidotic  -nephrology and oncology consult

## 2021-10-06 NOTE — DIETITIAN INITIAL EVALUATION ADULT. - OTHER INFO
66 YOM admitted with AMS, malignant ascites c/b SBP with abd drain in place PMH of colorectal cancer (mets to liver first dx'd Dec 2020, started chemo in Feb 2021).  SLP alejandrina on (10/4/21) recommends to keep pt NPO with alternate means of nutrition till mental status improves for reevaluation. Pt with +ascites,  distended abdomen per nursing flow sheet.    As per Palliative care note- risk of peg is high with ascites and encourage small amounts of food all day long when mental status improves. Till PO feasible, while in hospital, recommend NGT feeds of 2 Patrick HN (fluid restricted formula). Recommend 2 Patrick HN at 40 ml/hr x 24 goal rate to provide 1920 kcal/84 gm protein, 960 ml total volume. Water flushes as per MD discretion.   As per son the goal is Hospice care at home.

## 2021-10-06 NOTE — CONSULT NOTE ADULT - CONSULT REQUESTED DATE/TIME
04-Oct-2021 08:00
04-Oct-2021 13:49
04-Oct-2021 02:26
05-Oct-2021 18:17
06-Oct-2021 10:19
05-Oct-2021 13:22

## 2021-10-06 NOTE — CONSULT NOTE ADULT - CONSULT REASON
Chryseobacterium indolgenes in abdominal fluid positive culture for Chryseobacterium indolgenes in abdominal fluid

## 2021-10-06 NOTE — DIETITIAN INITIAL EVALUATION ADULT. - PERTINENT LABORATORY DATA
10-06 Na 131 mmol/L<L> Glu 100 mg/dL<H> K+ 4.9 mmol/L Cr 2.11 mg/dL<H> BUN 76 mg/dL<H> Phos 3.9 mg/dL  10-06 @ 11:49 POCT 107 mg/dL  10-06 @ 07:18 POCT 122 mg/dL  10-06 @ 00:13 POCT 94 mg/dL  10-05 @ 20:41 POCT 108 mg/dL  10-05 @ 16:47 POCT 79 mg/dL

## 2021-10-06 NOTE — DIETITIAN INITIAL EVALUATION ADULT. - PERTINENT MEDS FT
MEDICATIONS  (STANDING):  cefTRIAXone   IVPB 1000 milliGRAM(s) IV Intermittent every 24 hours  dextrose 40% Gel 15 Gram(s) Oral once  dextrose 5% + sodium chloride 0.9%. 1000 milliLiter(s) (100 mL/Hr) IV Continuous <Continuous>  dextrose 50% Injectable 25 Gram(s) IV Push once  dextrose 50% Injectable 12.5 Gram(s) IV Push once  dextrose 50% Injectable 25 Gram(s) IV Push once  glucagon  Injectable 1 milliGRAM(s) IntraMuscular once  heparin  Infusion.  Unit(s)/Hr (8 mL/Hr) IV Continuous <Continuous>  lidocaine 2% Jelly 10 milliLiter(s) IntraUrethral once  pantoprazole  Injectable 40 milliGRAM(s) IV Push daily  senna 2 Tablet(s) Oral at bedtime  sucralfate suspension 1 Gram(s) Oral two times a day

## 2021-10-06 NOTE — CONSULT NOTE ADULT - ATTENDING COMMENTS
67 yo M with a pmhx of colon CA with mets currently on chemo, DVT on xarelto, chronic abdominal port 2/2 ascitics with prior SBP presents to the ED with increased abdominal pain and AMS. MICU consulted for concern for TLS.  Ct a/p  aggressive IVF, consider HCO3 gtt  shetty strict I and O  renal and heme/onc eval in am   serial BMP, uric acid LDH and mg and phos  overall guarded prognosis  Palliative care eval
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
SIRISHA likely hemodynamic in setting of cachexia.  Recommend intravsascular expansion can use albumin and NS.  TLS rare in solid tumor but given uric acid, potassium, SIRISHA risk benefit favors treatment with fluids to promote urine output

## 2021-10-06 NOTE — CONSULT NOTE ADULT - SUBJECTIVE AND OBJECTIVE BOX
HPI:  66 YOM with  colorectal cancer (mets to liver first dx'd Dec 2020, started chemo in 2021), malignant ascites c/b SBP with abd drain in place,  and DVTs on Xarelto presented to the ED for altered mental status.     Pt is lethargic unable to give full history. Collaterals are not available at this time. At per chart, pt became progressively lethargic, bedbound and had poor po intake in the past week, and has missed his chemo session. Currently he denies fever, chills, chest pain, sob, N/V/D,  but endorses mild abd pain. Unable to confirm if he is taking his meds    In ER, pt is afebrile, hypotensive 90/60, satting well on RA. labs notable for elevated Cr/LDH/uric acid, as well as hypoNa/HyperK. Admitted for metabolic encephalopathy, SIRISHA and concern for TLS.     Since admission, patient has remained hypotensive to 90s-100s despite fluids and albumin. Patient's abdominal fluid was drained, with studies showing total nucleated cells of 320 with only 6% PMNs (19 total), and as such not treated for SBP. Cultures grew chryseobacterium indolgenes, only sensitive to ciprofloxacin, levofloxacin, and bactrim. Patient today is awake and conversative, A&Ox3, and has abdominal pain. Following all commands. Says he feels "better." He denies any chest pain, but endorses weakness and fatigue.     PAST MEDICAL & SURGICAL HISTORY:  HTN (hypertension)    Prostate cancer    Colorectal cancer    S/P TURP (transurethral resection of prostate)    Review of Systems:   CONSTITUTIONAL: No fever, +weight loss  EYES: No eye pain, visual disturbances, or discharge  ENMT:  No difficulty hearing, tinnitus, vertigo; No sinus or throat pain  NECK: No pain or stiffness  RESPIRATORY: No cough No shortness of breath  CARDIOVASCULAR: No chest pain, +leg swelling  GASTROINTESTINAL: +abdomina pain.   MUSCULOSKELETAL:  No muscle pain  HEME/LYMPH: No easy bruising    Allergies    No Known Allergies    Intolerances        Social History:     FAMILY HISTORY:  No pertinent family history in first degree relatives        MEDICATIONS  (STANDING):    MEDICATIONS  (PRN):      T(C): 36.6 (10-04-21 @ 09:10), Max: 36.6 (10-04-21 @ 09:10)  HR: 86 (10-04-21 @ 09:10) (86 - 98)  BP: 92/74 (10-04-21 @ 09:10) (92/74 - 112/91)  RR: 16 (10-04-21 @ 09:10) (16 - 20)  SpO2: 98% (10-04-21 @ 09:10) (98% - 100%)  Height (cm): 165.1 (10-03-21 @ 19:35)  CAPILLARY BLOOD GLUCOSE      POCT Blood Glucose.: 106 mg/dL (04 Oct 2021 06:57)  POCT Blood Glucose.: 112 mg/dL (03 Oct 2021 23:32)    I&O's Summary    PHYSICAL EXAM:  GENERAL: NAD, lethargic. cachectic with temporal wasting   HEAD:  Atraumatic, Normocephalic  EYES: EOMI, PERRLA, conjunctiva and sclera clear  NECK: Supple, No elevated JVD  CHEST/LUNG: Clear to auscultation bilaterally without any wheezing, rales, crackles. Mediport in place without any tenderness around it.  HEART: Regular rate and rhythm; No murmurs, rubs, or gallops  ABDOMEN: Soft, mild TTP, drainage cath in place without any erythema around it   EXTREMITIES:  2+ pitting edema b/l LE   PSYCH: AAOx 2-3  NEUROLOGY: opens eyes to voice, answers yes/no questions, moving all extremities  SKIN: No rashes or lesions    LABS:                        10.0   8.65  )-----------( 110      ( 04 Oct 2021 06:52 )             31.5     10-04    127<L>  |  91<L>  |  79<H>  ----------------------------<  115<H>  5.4<H>   |  19<L>  |  2.57<H>    Ca    8.4      04 Oct 2021 06:52  Phos  4.0     10-04  Mg     2.70     10-04    TPro  5.3<L>  /  Alb  2.4<L>  /  TBili  1.1  /  DBili  x   /  AST  76<H>  /  ALT  32  /  AlkPhos  1153<H>  10-04                RADIOLOGY & ADDITIONAL TESTS:    ECG Personally Reviewed - NSR, no peaked T waves        (04 Oct 2021 09:49)    Recent Ill Contacts:	[x] No	[] Yes:  Recent Travel History:	[x] No	[] Yes:  Recent Animal/Insect Exposure/Tick Bites:	[x] No	[] Yes:    Allergies    No Known Allergies    Intolerances      Antimicrobials:  cefTRIAXone   IVPB 1000 milliGRAM(s) IV Intermittent every 24 hours      Other Medications:  dextrose 40% Gel 15 Gram(s) Oral once  dextrose 5% + sodium chloride 0.9%. 1000 milliLiter(s) IV Continuous <Continuous>  dextrose 50% Injectable 25 Gram(s) IV Push once  dextrose 50% Injectable 12.5 Gram(s) IV Push once  dextrose 50% Injectable 25 Gram(s) IV Push once  glucagon  Injectable 1 milliGRAM(s) IntraMuscular once  heparin   Injectable 3500 Unit(s) IV Push every 6 hours PRN  heparin   Injectable 1500 Unit(s) IV Push every 6 hours PRN  heparin  Infusion.  Unit(s)/Hr IV Continuous <Continuous>  HYDROmorphone  Injectable 0.5 milliGRAM(s) IV Push every 4 hours PRN  lidocaine 2% Jelly 10 milliLiter(s) IntraUrethral once      FAMILY HISTORY:  No pertinent family history in first degree relatives      PAST MEDICAL & SURGICAL HISTORY:  HTN (hypertension)    Prostate cancer    Colorectal cancer    S/P TURP (transurethral resection of prostate)      SOCIAL HISTORY:    Daily     Daily   Head Circumference:  Vital Signs Last 24 Hrs  T(C): 36.3 (06 Oct 2021 06:54), Max: 36.5 (05 Oct 2021 21:20)  T(F): 97.3 (06 Oct 2021 06:54), Max: 97.7 (05 Oct 2021 21:20)  HR: 70 (06 Oct 2021 06:54) (70 - 85)  BP: 100/72 (06 Oct 2021 06:54) (89/63 - 100/72)  BP(mean): --  RR: 14 (06 Oct 2021 06:54) (8 - 14)  SpO2: 100% (06 Oct 2021 06:54) (98% - 100%)    Lab Results:                        8.9    7.70  )-----------( 96       ( 06 Oct 2021 02:57 )             28.1     10-06    131<L>  |  95<L>  |  76<H>  ----------------------------<  100<H>  4.9   |  18<L>  |  2.11<H>    Ca    8.3<L>      06 Oct 2021 02:57  Phos  3.9     10-  Mg     2.50     10-    TPro  x   /  Alb  2.5<L>  /  TBili  x   /  DBili  x   /  AST  x   /  ALT  x   /  AlkPhos  x   10    LIVER FUNCTIONS - ( 06 Oct 2021 03:01 )  Alb: 2.5 g/dL / Pro: x     / ALK PHOS: x     / ALT: x     / AST: x     / GGT: x           PTT - ( 06 Oct 2021 02:57 )  PTT:>200.0 sec  Urinalysis Basic - ( 05 Oct 2021 11:58 )    Color: Yellow / Appearance: Clear / S.020 / pH: x  Gluc: x / Ketone: Negative  / Bili: Negative / Urobili: <2 mg/dL   Blood: x / Protein: 30 mg/dL / Nitrite: Negative   Leuk Esterase: Negative / RBC: 3 /HPF / WBC 1 /HPF   Sq Epi: x / Non Sq Epi: 0 /HPF / Bacteria: Negative    RADIOLOGY:  < from: CT Abdomen and Pelvis No Cont (10.04.21 @ 06:01) >  IMPRESSION:  Limited noncontrast study.  Redemonstrated colonic mass/neoplasm, extensive bilobar hepatic metastatic disease and moderate volumeascites. New right abdominal approach catheter.  < end of copied text >       HPI:  66 YOM with  colorectal cancer (mets to liver first dx'd Dec 2020, started chemo in 2021), malignant ascites c/b SBP with abd drain in place,  and DVTs on Xarelto presented to the ED for altered mental status.     In ER, pt is afebrile, hypotensive 90/60, satting well on RA. labs notable for elevated Cr/LDH/uric acid, as well as hypoNa/HyperK. Admitted for metabolic encephalopathy, SIRISHA and concern for TLS.     Since admission, patient has remained hypotensive to 90s-100s despite fluids and albumin. Patient's abdominal fluid was drained, with studies showing total nucleated cells of 320 with only 6% PMNs (19 total), and as such not treated for SBP. Cultures grew chryseobacterium indolgenes, only sensitive to ciprofloxacin, levofloxacin, and bactrim. Patient today is awake and conversative, A&Ox3, and has abdominal pain. Following all commands. Says he feels "better." He denies any chest pain, but endorses weakness and fatigue.     PAST MEDICAL & SURGICAL HISTORY:  HTN (hypertension)    Prostate cancer    Colorectal cancer    S/P TURP (transurethral resection of prostate)    Review of Systems:   CONSTITUTIONAL: No fever, +weight loss  EYES: No eye pain, visual disturbances, or discharge  ENMT:  No difficulty hearing, tinnitus, vertigo; No sinus or throat pain  NECK: No pain or stiffness  RESPIRATORY: No cough No shortness of breath  CARDIOVASCULAR: No chest pain, +leg swelling  GASTROINTESTINAL: +abdomina pain.   MUSCULOSKELETAL:  No muscle pain  HEME/LYMPH: No easy bruising    Allergies    No Known Allergies    Intolerances        Social History:     FAMILY HISTORY:  No pertinent family history in first degree relatives        MEDICATIONS  (STANDING):    MEDICATIONS  (PRN):      T(C): 36.6 (10-04-21 @ 09:10), Max: 36.6 (10-04-21 @ 09:10)  HR: 86 (10-04-21 @ 09:10) (86 - 98)  BP: 92/74 (10-04-21 @ 09:10) (92/74 - 112/91)  RR: 16 (10-04-21 @ 09:10) (16 - 20)  SpO2: 98% (10-04-21 @ 09:10) (98% - 100%)  Height (cm): 165.1 (10-03-21 @ 19:35)  CAPILLARY BLOOD GLUCOSE      POCT Blood Glucose.: 106 mg/dL (04 Oct 2021 06:57)  POCT Blood Glucose.: 112 mg/dL (03 Oct 2021 23:32)    I&O's Summary    PHYSICAL EXAM:  GENERAL: NAD, lethargic. cachectic with temporal wasting   HEAD:  Atraumatic, Normocephalic  EYES: EOMI, PERRLA, conjunctiva and sclera clear  NECK: Supple, No elevated JVD  CHEST/LUNG: Clear to auscultation bilaterally without any wheezing, rales, crackles. Mediport in place without any tenderness around it.  HEART: Regular rate and rhythm; No murmurs, rubs, or gallops  ABDOMEN: Soft, mild TTP, drainage cath in place without any erythema around it   EXTREMITIES:  2+ pitting edema b/l LE   PSYCH: AAOx 2-3  NEUROLOGY: opens eyes to voice, answers yes/no questions, moving all extremities  SKIN: No rashes or lesions    LABS:                        10.0   8.65  )-----------( 110      ( 04 Oct 2021 06:52 )             31.5     10-04    127<L>  |  91<L>  |  79<H>  ----------------------------<  115<H>  5.4<H>   |  19<L>  |  2.57<H>    Ca    8.4      04 Oct 2021 06:52  Phos  4.0     10-04  Mg     2.70     10-04    TPro  5.3<L>  /  Alb  2.4<L>  /  TBili  1.1  /  DBili  x   /  AST  76<H>  /  ALT  32  /  AlkPhos  1153<H>  10-04                RADIOLOGY & ADDITIONAL TESTS:    ECG Personally Reviewed - NSR, no peaked T waves        (04 Oct 2021 09:49)    Recent Ill Contacts:	[x] No	[] Yes:  Recent Travel History:	[x] No	[] Yes:  Recent Animal/Insect Exposure/Tick Bites:	[x] No	[] Yes:    Allergies    No Known Allergies    Intolerances      Antimicrobials:  cefTRIAXone   IVPB 1000 milliGRAM(s) IV Intermittent every 24 hours      Other Medications:  dextrose 40% Gel 15 Gram(s) Oral once  dextrose 5% + sodium chloride 0.9%. 1000 milliLiter(s) IV Continuous <Continuous>  dextrose 50% Injectable 25 Gram(s) IV Push once  dextrose 50% Injectable 12.5 Gram(s) IV Push once  dextrose 50% Injectable 25 Gram(s) IV Push once  glucagon  Injectable 1 milliGRAM(s) IntraMuscular once  heparin   Injectable 3500 Unit(s) IV Push every 6 hours PRN  heparin   Injectable 1500 Unit(s) IV Push every 6 hours PRN  heparin  Infusion.  Unit(s)/Hr IV Continuous <Continuous>  HYDROmorphone  Injectable 0.5 milliGRAM(s) IV Push every 4 hours PRN  lidocaine 2% Jelly 10 milliLiter(s) IntraUrethral once      FAMILY HISTORY:  No pertinent family history in first degree relatives      PAST MEDICAL & SURGICAL HISTORY:  HTN (hypertension)    Prostate cancer    Colorectal cancer    S/P TURP (transurethral resection of prostate)      SOCIAL HISTORY:    Daily     Daily   Head Circumference:  Vital Signs Last 24 Hrs  T(C): 36.3 (06 Oct 2021 06:54), Max: 36.5 (05 Oct 2021 21:20)  T(F): 97.3 (06 Oct 2021 06:54), Max: 97.7 (05 Oct 2021 21:20)  HR: 70 (06 Oct 2021 06:54) (70 - 85)  BP: 100/72 (06 Oct 2021 06:54) (89/63 - 100/72)  BP(mean): --  RR: 14 (06 Oct 2021 06:54) (8 - 14)  SpO2: 100% (06 Oct 2021 06:54) (98% - 100%)    Lab Results:                        8.9    7.70  )-----------( 96       ( 06 Oct 2021 02:57 )             28.1     10-06    131<L>  |  95<L>  |  76<H>  ----------------------------<  100<H>  4.9   |  18<L>  |  2.11<H>    Ca    8.3<L>      06 Oct 2021 02:57  Phos  3.9     10-  Mg     2.50     10-    TPro  x   /  Alb  2.5<L>  /  TBili  x   /  DBili  x   /  AST  x   /  ALT  x   /  AlkPhos  x   10-    LIVER FUNCTIONS - ( 06 Oct 2021 03:01 )  Alb: 2.5 g/dL / Pro: x     / ALK PHOS: x     / ALT: x     / AST: x     / GGT: x           PTT - ( 06 Oct 2021 02:57 )  PTT:>200.0 sec  Urinalysis Basic - ( 05 Oct 2021 11:58 )    Color: Yellow / Appearance: Clear / S.020 / pH: x  Gluc: x / Ketone: Negative  / Bili: Negative / Urobili: <2 mg/dL   Blood: x / Protein: 30 mg/dL / Nitrite: Negative   Leuk Esterase: Negative / RBC: 3 /HPF / WBC 1 /HPF   Sq Epi: x / Non Sq Epi: 0 /HPF / Bacteria: Negative    RADIOLOGY:  < from: CT Abdomen and Pelvis No Cont (10.04.21 @ 06:01) >  IMPRESSION:  Limited noncontrast study.  Redemonstrated colonic mass/neoplasm, extensive bilobar hepatic metastatic disease and moderate volumeascites. New right abdominal approach catheter.  < end of copied text >       HPI:  66 YOM with  colorectal cancer (mets to liver first dx'd Dec 2020, started chemo in Feb 2021), malignant ascites c/b SBP with abd drain in place,  and DVTs on Xarelto presented to the ED for altered mental status.     In ER, pt is afebrile, hypotensive 90/60, satting well on RA. labs notable for elevated Cr/LDH/uric acid, as well as hypoNa/HyperK. Admitted for metabolic encephalopathy, SIRISHA and concern for TLS.     Since admission, patient has remained hypotensive to 90s-100s despite fluids and albumin. Patient's abdominal fluid was drained, with studies showing total nucleated cells of 320 with only 6% PMNs (19 total), and as such not treated for SBP. Cultures grew chryseobacterium indolgenes, only sensitive to ciprofloxacin, levofloxacin, and bactrim. Patient today is awake and conversative, A&Ox3, and has abdominal pain. Following all commands. Says he feels "better." He denies any chest pain, but endorses weakness and fatigue.     PAST MEDICAL & SURGICAL HISTORY:  HTN (hypertension)    Prostate cancer    Colorectal cancer    S/P TURP (transurethral resection of prostate)    Review of Systems:   CONSTITUTIONAL: No fever, +weight loss  EYES: No eye pain, visual disturbances, or discharge  ENMT:  No difficulty hearing, tinnitus, vertigo; No sinus or throat pain  NECK: No pain or stiffness  RESPIRATORY: No cough No shortness of breath  CARDIOVASCULAR: No chest pain, +leg swelling  GASTROINTESTINAL: +abdomina pain.   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 cachectic with temporal wasting   HEAD:  Atraumatic, Normocephalic  EYES: EOMI, PERRLA, conjunctiva and sclera clear  NECK: Supple, No elevated JVD  CHEST/LUNG: Clear to auscultation bilaterally without any wheezing, rales, crackles. Mediport in place without any tenderness around it.  HEART: Regular rate and rhythm; No murmurs, rubs, or gallops  ABDOMEN: Soft, mild TTP, drainage cath in place without any erythema around it   ascites, nodular liver battotable   EXTREMITIES:  2+ pitting edema b/l LE   PSYCH: AAOx 2-3  NEUROLOGY: opens eyes to voice, answers yes/no questions, moving all extremities  SKIN: No rashes or lesions    LABS:                        10.0   8.65  )-----------( 110      ( 04 Oct 2021 06:52 )             31.5   WBC Count: 7.70 (10-06 @ 02:57)  WBC Count: 10.05 (10-05 @ 04:32)  WBC Count: 10.01 (10-04 @ 23:22)  WBC Count: 6.50 (10-04 @ 21:45)  WBC Count: 8.65 (10-04 @ 06:52)  WBC Count: 8.54 (10-03 @ 21:03)    10-04    127<L>  |  91<L>  |  79<H>  ----------------------------<  115<H>  5.4<H>   |  19<L>  |  2.57<H>    Ca    8.4      04 Oct 2021 06:52  Phos  4.0     10-04  Mg     2.70     10-04    TPro  5.3<L>  /  Alb  2.4<L>  /  TBili  1.1  /  DBili  x   /  AST  76<H>  /  ALT  32  /  AlkPhos  1153<H>  10-04      RADIOLOGY & ADDITIONAL TESTS:    ECG Personally Reviewed - NSR, no peaked T waves     Antimicrobials:  cefTRIAXone   IVPB 1000 milliGRAM(s) IV Intermittent every 24 hours      Other Medications:  dextrose 40% Gel 15 Gram(s) Oral once  dextrose 5% + sodium chloride 0.9%. 1000 milliLiter(s) IV Continuous <Continuous>  dextrose 50% Injectable 25 Gram(s) IV Push once  dextrose 50% Injectable 12.5 Gram(s) IV Push once  dextrose 50% Injectable 25 Gram(s) IV Push once  glucagon  Injectable 1 milliGRAM(s) IntraMuscular once  heparin   Injectable 3500 Unit(s) IV Push every 6 hours PRN  heparin   Injectable 1500 Unit(s) IV Push every 6 hours PRN  heparin  Infusion.  Unit(s)/Hr IV Continuous <Continuous>  HYDROmorphone  Injectable 0.5 milliGRAM(s) IV Push every 4 hours PRN  lidocaine 2% Jelly 10 milliLiter(s) IntraUrethral once      RADIOLOGY:  < from: CT Abdomen and Pelvis No Cont (10.04.21 @ 06:01) >  IMPRESSION:  Limited noncontrast study.  Redemonstrated colonic mass/neoplasm, extensive bilobar hepatic metastatic disease and moderate volumeascites. New right abdominal approach catheter.  < end of copied text >

## 2021-10-06 NOTE — DIETITIAN INITIAL EVALUATION ADULT. - PROBLEM SELECTOR PLAN 6
-CT a/p again showed colon as liver mass and ascites   -marked elevation alk phos but normal bili- CBD dilatation on CT unchanged from prior. will order RUQ US r/o biliary obstruction, low suspicion at this time   -trend LFT  -oncology consult   -consider palliative care consult   -pain control with iv Dilaudid prn

## 2021-10-06 NOTE — PROGRESS NOTE ADULT - ASSESSMENT
JAELYN CLARKE is a 66y Male who presents with a chief complaint of AMS    Colon Cancer  - Follows with [facility]  - Diagnosed in February 2021; last treated with FOLFOX in August 2021. Metastatic disease worsening in September 2021 with discussions pending to switch to FOLFIRI + Bevacizumab.  - We will communicate with [facility] and answer any oncology questions as needed  - spoke to pt oncologist. pt not candidate for DMT   - pall eval   - son concerend about nutrition, GI consult for possible peg     Altered Mental Status/Abnormal Electrolytes  - Patient presents with progressive decline with acute kidney injury. Electrolyte disturbances with hyponatremia, hyperkalemia, hypochloremia.  - Alkaline phosphatase elevated at 1153.  - Uric acid very elevated at 14.3. In the ED had received rasburicase and IVF.  - Colon cancer is not likely associated with tumor lysis syndrome. Would like to see nephrology input.  -  Uric acid improved 9.1  - cr 2.36   - LDH slight elevated hapto normal   - more alert today   - ammonia is 86, consider lactulose/rifaxamine     History of DVT  - lower ext Acute thrombus extending from the left external iliac vein to the level of the left profunda vein. Please note the proximal extent of the thrombus is not visualized.  - cont heparin drip     anemia/ thrombocytopenia   - plt 98   - presenting with plt of 124   - in july was normal   - obtain HIT/ tra in view of acute clot   - if hit positive start argatroban   - cont to monitor cbc   - anemia likely from cancer     John Vogel MD  Hematology Oncology   O: [phone]  C: [phone]      JAELYN CLARKE is a 66y Male who presents with a chief complaint of AMS    Colon Cancer  - Follows with [facility]  - Diagnosed in February 2021; last treated with FOLFOX in August 2021. Metastatic disease worsening in September 2021 with discussions pending to switch to FOLFIRI + Bevacizumab.  - We will communicate with [facility] and answer any oncology questions as needed  - spoke to pt oncologist. pt not candidate for DMT   - pall eval for hospice   - son concerend about nutrition, GI consult for possible peg     Altered Mental Status/Abnormal Electrolytes  - Patient presents with progressive decline with acute kidney injury. Electrolyte disturbances with hyponatremia, hyperkalemia, hypochloremia.  - Alkaline phosphatase elevated at 1153.  - Uric acid very elevated at 14.3. In the ED had received rasburicase and IVF.  - Colon cancer is not likely associated with tumor lysis syndrome. Would like to see nephrology input.  -  Uric acid improved 9.1  - cr 2.36   - LDH slight elevated hapto normal   - more alert today   - ammonia is 86, consider lactulose/rifaxamine     History of DVT  - lower ext Acute thrombus extending from the left external iliac vein to the level of the left profunda vein. Please note the proximal extent of the thrombus is not visualized.  - cont heparin drip     anemia/ thrombocytopenia   - plt 98   - presenting with plt of 124   - in july was normal   - cont to monitor     John Vogel MD  Hematology Oncology   O: [phone]  C: [phone]

## 2021-10-07 LAB
ANION GAP SERPL CALC-SCNC: 16 MMOL/L — HIGH (ref 7–14)
APTT BLD: 89.7 SEC — HIGH (ref 27–36.3)
BUN SERPL-MCNC: 65 MG/DL — HIGH (ref 7–23)
CALCIUM SERPL-MCNC: 7.8 MG/DL — LOW (ref 8.4–10.5)
CHLORIDE SERPL-SCNC: 99 MMOL/L — SIGNIFICANT CHANGE UP (ref 98–107)
CO2 SERPL-SCNC: 19 MMOL/L — LOW (ref 22–31)
CREAT SERPL-MCNC: 1.62 MG/DL — HIGH (ref 0.5–1.3)
GLUCOSE SERPL-MCNC: 157 MG/DL — HIGH (ref 70–99)
HCT VFR BLD CALC: 28.9 % — LOW (ref 39–50)
HGB BLD-MCNC: 9.1 G/DL — LOW (ref 13–17)
LACTATE SERPL-SCNC: 5.4 MMOL/L — CRITICAL HIGH (ref 0.5–2)
MAGNESIUM SERPL-MCNC: 2.4 MG/DL — SIGNIFICANT CHANGE UP (ref 1.6–2.6)
MCHC RBC-ENTMCNC: 22.3 PG — LOW (ref 27–34)
MCHC RBC-ENTMCNC: 31.5 GM/DL — LOW (ref 32–36)
MCV RBC AUTO: 70.8 FL — LOW (ref 80–100)
NRBC # BLD: 0 /100 WBCS — SIGNIFICANT CHANGE UP
NRBC # FLD: 0 K/UL — SIGNIFICANT CHANGE UP
PHOSPHATE SERPL-MCNC: 3.1 MG/DL — SIGNIFICANT CHANGE UP (ref 2.5–4.5)
PLATELET # BLD AUTO: 73 K/UL — LOW (ref 150–400)
POTASSIUM SERPL-MCNC: 4.2 MMOL/L — SIGNIFICANT CHANGE UP (ref 3.5–5.3)
POTASSIUM SERPL-SCNC: 4.2 MMOL/L — SIGNIFICANT CHANGE UP (ref 3.5–5.3)
RBC # BLD: 4.08 M/UL — LOW (ref 4.2–5.8)
RBC # FLD: 21 % — HIGH (ref 10.3–14.5)
SODIUM SERPL-SCNC: 134 MMOL/L — LOW (ref 135–145)
WBC # BLD: 7.18 K/UL — SIGNIFICANT CHANGE UP (ref 3.8–10.5)
WBC # FLD AUTO: 7.18 K/UL — SIGNIFICANT CHANGE UP (ref 3.8–10.5)

## 2021-10-07 PROCEDURE — 99232 SBSQ HOSP IP/OBS MODERATE 35: CPT

## 2021-10-07 PROCEDURE — 99233 SBSQ HOSP IP/OBS HIGH 50: CPT | Mod: GC

## 2021-10-07 RX ORDER — HYDROMORPHONE HYDROCHLORIDE 2 MG/ML
0.2 INJECTION INTRAMUSCULAR; INTRAVENOUS; SUBCUTANEOUS
Refills: 0 | Status: DISCONTINUED | OUTPATIENT
Start: 2021-10-07 | End: 2021-10-12

## 2021-10-07 RX ORDER — HYDROMORPHONE HYDROCHLORIDE 2 MG/ML
0.5 INJECTION INTRAMUSCULAR; INTRAVENOUS; SUBCUTANEOUS
Refills: 0 | Status: DISCONTINUED | OUTPATIENT
Start: 2021-10-07 | End: 2021-10-12

## 2021-10-07 RX ORDER — CIPROFLOXACIN LACTATE 400MG/40ML
200 VIAL (ML) INTRAVENOUS EVERY 12 HOURS
Refills: 0 | Status: DISCONTINUED | OUTPATIENT
Start: 2021-10-08 | End: 2021-10-12

## 2021-10-07 RX ORDER — HYDROMORPHONE HYDROCHLORIDE 2 MG/ML
0.2 INJECTION INTRAMUSCULAR; INTRAVENOUS; SUBCUTANEOUS
Refills: 0 | Status: DISCONTINUED | OUTPATIENT
Start: 2021-10-07 | End: 2021-10-07

## 2021-10-07 RX ADMIN — Medication 1 GRAM(S): at 17:44

## 2021-10-07 RX ADMIN — HEPARIN SODIUM 900 UNIT(S)/HR: 5000 INJECTION INTRAVENOUS; SUBCUTANEOUS at 07:15

## 2021-10-07 RX ADMIN — PANTOPRAZOLE SODIUM 40 MILLIGRAM(S): 20 TABLET, DELAYED RELEASE ORAL at 12:52

## 2021-10-07 RX ADMIN — HYDROMORPHONE HYDROCHLORIDE 0.2 MILLIGRAM(S): 2 INJECTION INTRAMUSCULAR; INTRAVENOUS; SUBCUTANEOUS at 00:16

## 2021-10-07 RX ADMIN — HYDROMORPHONE HYDROCHLORIDE 0.2 MILLIGRAM(S): 2 INJECTION INTRAMUSCULAR; INTRAVENOUS; SUBCUTANEOUS at 00:02

## 2021-10-07 RX ADMIN — HYDROMORPHONE HYDROCHLORIDE 0.5 MILLIGRAM(S): 2 INJECTION INTRAMUSCULAR; INTRAVENOUS; SUBCUTANEOUS at 18:43

## 2021-10-07 RX ADMIN — HYDROMORPHONE HYDROCHLORIDE 0.5 MILLIGRAM(S): 2 INJECTION INTRAMUSCULAR; INTRAVENOUS; SUBCUTANEOUS at 18:13

## 2021-10-07 RX ADMIN — SENNA PLUS 2 TABLET(S): 8.6 TABLET ORAL at 21:16

## 2021-10-07 RX ADMIN — HYDROMORPHONE HYDROCHLORIDE 0.2 MILLIGRAM(S): 2 INJECTION INTRAMUSCULAR; INTRAVENOUS; SUBCUTANEOUS at 10:24

## 2021-10-07 RX ADMIN — HYDROMORPHONE HYDROCHLORIDE 0.2 MILLIGRAM(S): 2 INJECTION INTRAMUSCULAR; INTRAVENOUS; SUBCUTANEOUS at 10:54

## 2021-10-07 RX ADMIN — Medication 200 MILLIGRAM(S): at 05:20

## 2021-10-07 RX ADMIN — Medication 200 MILLIGRAM(S): at 17:45

## 2021-10-07 NOTE — CHART NOTE - NSCHARTNOTEFT_GEN_A_CORE
Chart reviewed, GOC noted. Kidney function improving. Nephrolgy will sign off. Please don't hesitate to re-consult if anything changes    If any questions, please feel free to contact me     Chris Go  Nephrology Fellow  [facility] Pager: [phone]  [facility] Pager: [phone]

## 2021-10-07 NOTE — PROVIDER CONTACT NOTE (CRITICAL VALUE NOTIFICATION) - ACTION/TREATMENT ORDERED:
ACP ad made aware. Awaiting further orders
provider made aware. follow nomogram and hold for 1 hour then decrease by 1mL/hr

## 2021-10-07 NOTE — CHART NOTE - NSCHARTNOTEFT_GEN_A_CORE
Patient's family with concerns for need for NGT, while NPO  Bedside swallow eval by palliative team whom recommended comfort oral feeding Dysphagia 1 diet.

## 2021-10-07 NOTE — PROGRESS NOTE ADULT - ASSESSMENT
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

## 2021-10-07 NOTE — PROVIDER CONTACT NOTE (CRITICAL VALUE NOTIFICATION) - BACKGROUND
pt admitted for hyponatremia, pt is on a heparin gtt
Patient admitted for hyponatremia, hypoosmolarity. Patient on heparin for DVT.

## 2021-10-07 NOTE — PROGRESS NOTE ADULT - ASSESSMENT
JAELYN CLARKE is a 66y Male who presents with a chief complaint of AMS    Colon Cancer  - Follows with [facility]  - Diagnosed in February 2021; last treated with FOLFOX in August 2021. Metastatic disease worsening in September 2021 with discussions pending to switch to FOLFIRI + Bevacizumab.  - spoke to pt oncologist. pt not candidate for DMT   - pall eval for hospice appreciated  - on pleasure feeds    Altered Mental Status/Abnormal Electrolytes  - Patient presents with progressive decline with acute kidney injury. Electrolyte disturbances with hyponatremia, hyperkalemia, hypochloremia.  - Alkaline phosphatase elevated at 1153.  - Uric acid very elevated at 14.3. In the ED had received rasburicase and IVF.  -  Uric acid improved 9.1  - LDH slight elevated hapto normal     History of DVT  - lower ext Acute thrombus extending from the left external iliac vein to the level of the left profunda vein. Please note the proximal extent of the thrombus is not visualized.  - cont heparin drip   - consider changing to noac if c/w goc    anemia/ thrombocytopenia   - plts low but stable   - cont to monitor   - had been on heparin for 3 days    will follow. total time spent 35min, >50% of time spent in discussion and coordination of care.

## 2021-10-08 LAB
ALBUMIN SERPL ELPH-MCNC: 2.3 G/DL — LOW (ref 3.3–5)
ALP SERPL-CCNC: 906 U/L — HIGH (ref 40–120)
ALT FLD-CCNC: 24 U/L — SIGNIFICANT CHANGE UP (ref 4–41)
AMMONIA BLD-MCNC: 83 UMOL/L — HIGH (ref 11–55)
ANION GAP SERPL CALC-SCNC: 17 MMOL/L — HIGH (ref 7–14)
APTT BLD: 75.2 SEC — HIGH (ref 27–36.3)
AST SERPL-CCNC: 70 U/L — HIGH (ref 4–40)
BILIRUB SERPL-MCNC: 1.1 MG/DL — SIGNIFICANT CHANGE UP (ref 0.2–1.2)
BUN SERPL-MCNC: 53 MG/DL — HIGH (ref 7–23)
CALCIUM SERPL-MCNC: 7.9 MG/DL — LOW (ref 8.4–10.5)
CHLORIDE SERPL-SCNC: 104 MMOL/L — SIGNIFICANT CHANGE UP (ref 98–107)
CO2 SERPL-SCNC: 18 MMOL/L — LOW (ref 22–31)
CREAT SERPL-MCNC: 1.51 MG/DL — HIGH (ref 0.5–1.3)
GLUCOSE SERPL-MCNC: 112 MG/DL — HIGH (ref 70–99)
HCT VFR BLD CALC: 29.1 % — LOW (ref 39–50)
HGB BLD-MCNC: 9 G/DL — LOW (ref 13–17)
LACTATE SERPL-SCNC: 6 MMOL/L — CRITICAL HIGH (ref 0.5–2)
MAGNESIUM SERPL-MCNC: 2.3 MG/DL — SIGNIFICANT CHANGE UP (ref 1.6–2.6)
MCHC RBC-ENTMCNC: 22.4 PG — LOW (ref 27–34)
MCHC RBC-ENTMCNC: 30.9 GM/DL — LOW (ref 32–36)
MCV RBC AUTO: 72.6 FL — LOW (ref 80–100)
NRBC # BLD: 0 /100 WBCS — SIGNIFICANT CHANGE UP
NRBC # FLD: 0 K/UL — SIGNIFICANT CHANGE UP
PHOSPHATE SERPL-MCNC: 2.9 MG/DL — SIGNIFICANT CHANGE UP (ref 2.5–4.5)
PLATELET # BLD AUTO: 71 K/UL — LOW (ref 150–400)
POTASSIUM SERPL-MCNC: 4.4 MMOL/L — SIGNIFICANT CHANGE UP (ref 3.5–5.3)
POTASSIUM SERPL-SCNC: 4.4 MMOL/L — SIGNIFICANT CHANGE UP (ref 3.5–5.3)
PROT SERPL-MCNC: 4.6 G/DL — LOW (ref 6–8.3)
RBC # BLD: 4.01 M/UL — LOW (ref 4.2–5.8)
RBC # FLD: 20.9 % — HIGH (ref 10.3–14.5)
SODIUM SERPL-SCNC: 139 MMOL/L — SIGNIFICANT CHANGE UP (ref 135–145)
WBC # BLD: 8.8 K/UL — SIGNIFICANT CHANGE UP (ref 3.8–10.5)
WBC # FLD AUTO: 8.8 K/UL — SIGNIFICANT CHANGE UP (ref 3.8–10.5)

## 2021-10-08 PROCEDURE — 99233 SBSQ HOSP IP/OBS HIGH 50: CPT | Mod: GC

## 2021-10-08 PROCEDURE — 99232 SBSQ HOSP IP/OBS MODERATE 35: CPT

## 2021-10-08 RX ORDER — HYDROMORPHONE HYDROCHLORIDE 2 MG/ML
0.1 INJECTION INTRAMUSCULAR; INTRAVENOUS; SUBCUTANEOUS
Qty: 100 | Refills: 0 | Status: DISCONTINUED | OUTPATIENT
Start: 2021-10-08 | End: 2021-10-10

## 2021-10-08 RX ORDER — SIMETHICONE 80 MG/1
80 TABLET, CHEWABLE ORAL ONCE
Refills: 0 | Status: COMPLETED | OUTPATIENT
Start: 2021-10-08 | End: 2021-10-08

## 2021-10-08 RX ORDER — HYDROMORPHONE HYDROCHLORIDE 2 MG/ML
0.2 INJECTION INTRAMUSCULAR; INTRAVENOUS; SUBCUTANEOUS
Qty: 100 | Refills: 0 | Status: DISCONTINUED | OUTPATIENT
Start: 2021-10-08 | End: 2021-10-08

## 2021-10-08 RX ORDER — HYDROMORPHONE HYDROCHLORIDE 2 MG/ML
0.1 INJECTION INTRAMUSCULAR; INTRAVENOUS; SUBCUTANEOUS
Qty: 10 | Refills: 0 | Status: DISCONTINUED | OUTPATIENT
Start: 2021-10-08 | End: 2021-10-08

## 2021-10-08 RX ADMIN — HYDROMORPHONE HYDROCHLORIDE 0.5 MILLIGRAM(S): 2 INJECTION INTRAMUSCULAR; INTRAVENOUS; SUBCUTANEOUS at 09:45

## 2021-10-08 RX ADMIN — HEPARIN SODIUM 900 UNIT(S)/HR: 5000 INJECTION INTRAVENOUS; SUBCUTANEOUS at 09:15

## 2021-10-08 RX ADMIN — HYDROMORPHONE HYDROCHLORIDE 0.5 MILLIGRAM(S): 2 INJECTION INTRAMUSCULAR; INTRAVENOUS; SUBCUTANEOUS at 09:15

## 2021-10-08 RX ADMIN — HYDROMORPHONE HYDROCHLORIDE 0.5 MILLIGRAM(S): 2 INJECTION INTRAMUSCULAR; INTRAVENOUS; SUBCUTANEOUS at 15:27

## 2021-10-08 RX ADMIN — SODIUM CHLORIDE 100 MILLILITER(S): 9 INJECTION, SOLUTION INTRAVENOUS at 00:07

## 2021-10-08 RX ADMIN — HYDROMORPHONE HYDROCHLORIDE 0.5 MILLIGRAM(S): 2 INJECTION INTRAMUSCULAR; INTRAVENOUS; SUBCUTANEOUS at 05:13

## 2021-10-08 RX ADMIN — Medication 200 MILLIGRAM(S): at 17:23

## 2021-10-08 RX ADMIN — HYDROMORPHONE HYDROCHLORIDE 0.5 MILLIGRAM(S): 2 INJECTION INTRAMUSCULAR; INTRAVENOUS; SUBCUTANEOUS at 01:54

## 2021-10-08 RX ADMIN — Medication 1 GRAM(S): at 17:26

## 2021-10-08 RX ADMIN — SIMETHICONE 80 MILLIGRAM(S): 80 TABLET, CHEWABLE ORAL at 04:54

## 2021-10-08 RX ADMIN — HYDROMORPHONE HYDROCHLORIDE 0.1 MG/HR: 2 INJECTION INTRAMUSCULAR; INTRAVENOUS; SUBCUTANEOUS at 18:42

## 2021-10-08 RX ADMIN — Medication 1 GRAM(S): at 04:55

## 2021-10-08 RX ADMIN — PANTOPRAZOLE SODIUM 40 MILLIGRAM(S): 20 TABLET, DELAYED RELEASE ORAL at 12:34

## 2021-10-08 RX ADMIN — HYDROMORPHONE HYDROCHLORIDE 0.5 MILLIGRAM(S): 2 INJECTION INTRAMUSCULAR; INTRAVENOUS; SUBCUTANEOUS at 21:30

## 2021-10-08 RX ADMIN — HYDROMORPHONE HYDROCHLORIDE 0.5 MILLIGRAM(S): 2 INJECTION INTRAMUSCULAR; INTRAVENOUS; SUBCUTANEOUS at 15:57

## 2021-10-08 RX ADMIN — HYDROMORPHONE HYDROCHLORIDE 0.5 MILLIGRAM(S): 2 INJECTION INTRAMUSCULAR; INTRAVENOUS; SUBCUTANEOUS at 05:28

## 2021-10-08 RX ADMIN — Medication 200 MILLIGRAM(S): at 06:48

## 2021-10-08 RX ADMIN — HYDROMORPHONE HYDROCHLORIDE 0.5 MILLIGRAM(S): 2 INJECTION INTRAMUSCULAR; INTRAVENOUS; SUBCUTANEOUS at 02:09

## 2021-10-08 NOTE — PROGRESS NOTE ADULT - ASSESSMENT
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    He was taking CIPRO up to admission for SBP prophylaxis; Cipro resumed 10/6  s/p Ceftriaxone 10/2-->10/6    Chryseobacterium indolgenes  Susc to cipro  appears to be colonizing drainage catheter without infection    HIs cancer appears advanced, his cachexia marked and his functional status impaired - guarded outlook  with this degree of cachexia, I suspect more renal impairment than suggested by Creat  elevated lactate may represent tumor burden   son concerned about nutrition - he appears to be overlooking his fathers dismal prognosis      Suggest  continue Cipro 200 mg IV every 12 hours- I ordered (250 mg po every 12 hours if able to take po)  consider therapeutic paracentesis

## 2021-10-08 NOTE — PROGRESS NOTE ADULT - PROBLEM SELECTOR PLAN 5
hx of DVT on xarelto   -start heparin gtt for now as unable to take po   -repeat LE duplex, positive for DVT
spoke with son and family re: weight loss and poor po intake  would not repeat s/s   would allow pt to eat soft, thickened liquids (this is what pt was doing at home)  risk of peg high with ascites and sbp  alt meds no significant help and concern to start steroids for appetite stimulant due to infections   pt has tried marinal- no benefit in past  encourage small amounts of food all day long  hospice referral to [facility] as [facility] [place] take [place] [facility] [place]/dni  please call [phone] over weekend with questions of need to adjust pain regimen
hx of DVT on xarelto   -Contheparin gtt for now as unable to take po   -repeat LE duplex, positive for DVT
spoke with son and family re: weight loss and poor po intake  would not repeat s/s   would allow pt to eat soft, thickened liquids (this is what pt was doing at home)  risk of peg high with ascites and sbp  alt meds no significant help and concern to start steroids for appetite stimulant due to infections   pt has tried marinal- no benefit in past  pt has not yet signed up for medical marijuana  encourage small amounts of food all day long  hospice referral to [facility] as hcn unable to take pt on due to staffing in [facility]  pt is dnr/dni
spoke with son and family re: weight loss and poor po intake  would not repeat s/s   would allow pt to eat soft, thickened liquids (this is what pt was doing at home)  risk of peg high with ascites and sbp  alt meds no significant help and concern to start steroids for appetite stimulant due to infections   pt has tried marinal- no benefit in past  pt has not yet signed up for medical marijuana  encourage small amounts of food all day long  hospice referral to [facility] as hcn unable to take pt on due [place] [facility] [place]  pt is dnr/dni
hx of DVT on xarelto   -Contheparin gtt for now as unable to take po   -repeat LE duplex, positive for DVT

## 2021-10-08 NOTE — PROGRESS NOTE ADULT - ASSESSMENT
65 YO M with  colorectal cancer (mets to liver first dx'd Dec 2020, started chemo in Feb 2021), malignant ascites c/b SBP with abd drain in place,  and DVTs on Xarelto presented to the ED for altered mental status.   Na to 126  CTH neg   AMS likely multifactorial 2/2 toxic metabolic encephalopathy and multiple metabolic derrangements   - palliative called, hospice planning   - abx for infection per ID now on cipro   - heme/onc recs apprecaited  - ID recs appreciated   - SIRISHA, IVF  - DVT, on heparin drip   - check LFTs and ammonia   - pain control with Dilaudid  - DNR/I plan for home hospice   - spoke with family at bedside   - check FS, glucose control <180  - GI/DVT ppx  - Counseling on diet, exercise, and medication adherence was done  - Counseling on smoking cessation and alcohol consumption offered when appropriate.  - Pain assessed and judicious use of narcotics when appropriate was discussed.    - Stroke education given when appropriate.  - Importance of fall prevention discussed.   - Differential diagnosis and plan of care discussed with patient and/or family and primary team  - Thank you for allowing me to participate in the care of this patient. Call with questions.   Rico Tracy MD  Vascular Neurology  [phone]

## 2021-10-08 NOTE — HOSPICE CARE NOTE - CONVESATION DETAILS
[facility] MARIBEL spoke with pt's wife who only wants pt to come home. Pt could be discharged with a PCA pump. However at this time due to critical shortage in staffing in the area, we are unable to provide the best services needed for the pt. We suggest that the referral be sent to other hospices if hospice is the pt/families desired d/c plan

## 2021-10-08 NOTE — PROGRESS NOTE ADULT - ASSESSMENT
JAELYN CLARKE is a 66y Male who presents with a chief complaint of AMS    Colon Cancer  - Follows with [facility]  - Diagnosed in February 2021; last treated with FOLFOX in August 2021. Metastatic disease worsening in September 2021 with discussions pending to switch to FOLFIRI + Bevacizumab.  - No longer a candidate for further therapy; palliative care consulted for hospice referral.   - pall eval for hospice appreciated  - on pleasure feeds    Altered Mental Status/Abnormal Electrolytes  - Patient presents with progressive decline with acute kidney injury. Electrolyte disturbances with hyponatremia, hyperkalemia, hypochloremia.  - Alkaline phosphatase elevated at 1153.  - Uric acid very elevated at 14.3. In the ED had received rasburicase and IVF.  - Continue to monitor.     History of DVT  - lower ext Acute thrombus extending from the left external iliac vein to the level of the left profunda vein. Please note the proximal extent of the thrombus is not visualized.  - cont heparin drip. Would discuss with family on goals of care as to whether to continue heparin drip or switch to DOAC.     Anemia, Thrombocytopenia  - plts low but stable   - cont to monitor   - had been on heparin for 3 days    Salazar Ash MD  Hematology/Oncology  C: [phone]  O: [phone]

## 2021-10-09 LAB
ANION GAP SERPL CALC-SCNC: 16 MMOL/L — HIGH (ref 7–14)
APTT BLD: 75.7 SEC — HIGH (ref 27–36.3)
BUN SERPL-MCNC: 47 MG/DL — HIGH (ref 7–23)
BUN SERPL-MCNC: 53 MG/DL — HIGH (ref 7–23)
CALCIUM SERPL-MCNC: 8 MG/DL — LOW (ref 8.4–10.5)
CALCIUM SERPL-MCNC: SIGNIFICANT CHANGE UP MG/DL (ref 8.4–10.5)
CHLORIDE SERPL-SCNC: 105 MMOL/L — SIGNIFICANT CHANGE UP (ref 98–107)
CHLORIDE SERPL-SCNC: SIGNIFICANT CHANGE UP MMOL/L (ref 98–107)
CO2 SERPL-SCNC: 19 MMOL/L — LOW (ref 22–31)
CO2 SERPL-SCNC: SIGNIFICANT CHANGE UP MMOL/L (ref 22–31)
CREAT SERPL-MCNC: 1.62 MG/DL — HIGH (ref 0.5–1.3)
CREAT SERPL-MCNC: SIGNIFICANT CHANGE UP MG/DL (ref 0.5–1.3)
CULTURE RESULTS: SIGNIFICANT CHANGE UP
GLUCOSE SERPL-MCNC: 90 MG/DL — SIGNIFICANT CHANGE UP (ref 70–99)
GLUCOSE SERPL-MCNC: SIGNIFICANT CHANGE UP MG/DL (ref 70–99)
HCT VFR BLD CALC: 33.7 % — LOW (ref 39–50)
HGB BLD-MCNC: 9.2 G/DL — LOW (ref 13–17)
MAGNESIUM SERPL-MCNC: 2.3 MG/DL — SIGNIFICANT CHANGE UP (ref 1.6–2.6)
MAGNESIUM SERPL-MCNC: 2.6 MG/DL — SIGNIFICANT CHANGE UP (ref 1.6–2.6)
MCHC RBC-ENTMCNC: 22.5 PG — LOW (ref 27–34)
MCHC RBC-ENTMCNC: 27.3 GM/DL — LOW (ref 32–36)
MCV RBC AUTO: 82.6 FL — SIGNIFICANT CHANGE UP (ref 80–100)
NRBC # BLD: 0 /100 WBCS — SIGNIFICANT CHANGE UP
NRBC # FLD: 0 K/UL — SIGNIFICANT CHANGE UP
ORGANISM # SPEC MICROSCOPIC CNT: SIGNIFICANT CHANGE UP
ORGANISM # SPEC MICROSCOPIC CNT: SIGNIFICANT CHANGE UP
PHOSPHATE SERPL-MCNC: 3.4 MG/DL — SIGNIFICANT CHANGE UP (ref 2.5–4.5)
PLATELET # BLD AUTO: 81 K/UL — LOW (ref 150–400)
POTASSIUM SERPL-MCNC: 4.9 MMOL/L — SIGNIFICANT CHANGE UP (ref 3.5–5.3)
POTASSIUM SERPL-MCNC: SIGNIFICANT CHANGE UP MMOL/L (ref 3.5–5.3)
POTASSIUM SERPL-SCNC: 4.9 MMOL/L — SIGNIFICANT CHANGE UP (ref 3.5–5.3)
POTASSIUM SERPL-SCNC: SIGNIFICANT CHANGE UP MMOL/L (ref 3.5–5.3)
RBC # BLD: 4.08 M/UL — LOW (ref 4.2–5.8)
RBC # FLD: 32 % — HIGH (ref 10.3–14.5)
SODIUM SERPL-SCNC: 140 MMOL/L — SIGNIFICANT CHANGE UP (ref 135–145)
SODIUM SERPL-SCNC: SIGNIFICANT CHANGE UP MMOL/L (ref 135–145)
SPECIMEN SOURCE: SIGNIFICANT CHANGE UP
WBC # BLD: 9.95 K/UL — SIGNIFICANT CHANGE UP (ref 3.8–10.5)
WBC # FLD AUTO: 9.95 K/UL — SIGNIFICANT CHANGE UP (ref 3.8–10.5)

## 2021-10-09 RX ORDER — SODIUM CHLORIDE 9 MG/ML
1000 INJECTION, SOLUTION INTRAVENOUS
Refills: 0 | Status: DISCONTINUED | OUTPATIENT
Start: 2021-10-09 | End: 2021-10-12

## 2021-10-09 RX ORDER — ALTEPLASE 100 MG
2 KIT INTRAVENOUS ONCE
Refills: 0 | Status: DISCONTINUED | OUTPATIENT
Start: 2021-10-09 | End: 2021-10-12

## 2021-10-09 RX ADMIN — SODIUM CHLORIDE 100 MILLILITER(S): 9 INJECTION, SOLUTION INTRAVENOUS at 02:17

## 2021-10-09 RX ADMIN — SODIUM CHLORIDE 100 MILLILITER(S): 9 INJECTION, SOLUTION INTRAVENOUS at 18:18

## 2021-10-09 RX ADMIN — HYDROMORPHONE HYDROCHLORIDE 0.5 MILLIGRAM(S): 2 INJECTION INTRAMUSCULAR; INTRAVENOUS; SUBCUTANEOUS at 23:45

## 2021-10-09 RX ADMIN — PANTOPRAZOLE SODIUM 40 MILLIGRAM(S): 20 TABLET, DELAYED RELEASE ORAL at 11:15

## 2021-10-09 RX ADMIN — Medication 1 GRAM(S): at 18:12

## 2021-10-09 RX ADMIN — Medication 200 MILLIGRAM(S): at 18:26

## 2021-10-09 RX ADMIN — HYDROMORPHONE HYDROCHLORIDE 0.5 MILLIGRAM(S): 2 INJECTION INTRAMUSCULAR; INTRAVENOUS; SUBCUTANEOUS at 20:50

## 2021-10-09 RX ADMIN — Medication 1 GRAM(S): at 05:49

## 2021-10-09 RX ADMIN — Medication 50 MEQ/KG/HR: at 18:26

## 2021-10-09 RX ADMIN — Medication 200 MILLIGRAM(S): at 05:49

## 2021-10-09 RX ADMIN — HYDROMORPHONE HYDROCHLORIDE 0.5 MILLIGRAM(S): 2 INJECTION INTRAMUSCULAR; INTRAVENOUS; SUBCUTANEOUS at 07:26

## 2021-10-09 RX ADMIN — HYDROMORPHONE HYDROCHLORIDE 0.2 MILLIGRAM(S): 2 INJECTION INTRAMUSCULAR; INTRAVENOUS; SUBCUTANEOUS at 18:56

## 2021-10-09 NOTE — PROVIDER CONTACT NOTE (OTHER) - NAME OF MD/NP/PA/DO NOTIFIED:
Everett Gonzales, [id number]
Jojo Russell (- [facility]), [id number]
Everett Gonzales (- [facility]), [id number]
Everett Gonzales, [id number]
Tre CARLTON
[facility], #[id number]
Everett Gonzales (- [facility]), [id number]
Jojo Russell (- [facility]), [id number]

## 2021-10-09 NOTE — PROVIDER CONTACT NOTE (OTHER) - DATE AND TIME:
04-Oct-2021 21:50
09-Oct-2021 08:00
06-Oct-2021 05:50
06-Oct-2021 04:46
08-Oct-2021 03:10
05-Oct-2021 21:21
08-Oct-2021 01:50
06-Oct-2021 01:44

## 2021-10-09 NOTE — PROVIDER CONTACT NOTE (OTHER) - ACTION/TREATMENT ORDERED:
TRISH Mejia at bedside, removed access and reaccessed port under sterile conditions. port patent, good blood flow and flushes without difficulty.

## 2021-10-09 NOTE — PROVIDER CONTACT NOTE (OTHER) - SITUATION
pt RR is 8
pt new admit to floor, BP 89/63
Pt was moaning and grimacing.
pt has stat order for simethicone, pt is lethargic after receiving IV pain meds & don't want to give PO meds while pt is lethargic
pt is yelling out in pain, BP is 92/70. want to know if it's ok to give IV dilaudid
per night RN current chemoport line clotted; at bedside attempt to flush resistance met.
pt bladder scan showed 532mL in bladder
unable to straight cath pt, meeting resistance

## 2021-10-09 NOTE — PROGRESS NOTE ADULT - ASSESSMENT
JAELYN CLARKE is a 66y Male who presents with a chief complaint of AMS    Colon Cancer  - Follows with [facility]  - Diagnosed in February 2021; last treated with FOLFOX in August 2021. Metastatic disease worsening in September 2021 with discussions pending to switch to FOLFIRI + Bevacizumab.  - No longer a candidate for further therapy; palliative care consulted for hospice referral.   - Patient is on pleasure feeds. Patient ate yesterday.   - On PCA for pain control. Son expressed concern about too much opiates causing patient to be sleepy. Will need to continue to work with palliative care to optimize pain control.  - Decrease IVF to 50 cc/hour. Family expressed concern about third spacing of fluids. Continue to monitor.     Altered Mental Status/Abnormal Electrolytes  - Patient presents with progressive decline with acute kidney injury. Electrolyte disturbances with hyponatremia, hyperkalemia, hypochloremia.  - Alkaline phosphatase elevated at 1153.  - Uric acid very elevated at 14.3. In the ED had received rasburicase and IVF.  - Continue to monitor.     History of DVT  - lower ext Acute thrombus extending from the left external iliac vein to the level of the left profunda vein. Please note the proximal extent of the thrombus is not visualized.  - Patient remains on heparin drip. Will need to discuss with family on goals of care as to whether to continue heparin drip or switch to DOAC or discontinue anticoagulation all together.     Anemia, Thrombocytopenia  - PLT 81   - cont to monitor   - had been on heparin for 3 days    Salazar Ash MD  Hematology/Oncology  C: [phone]  O: [phone]

## 2021-10-09 NOTE — PROVIDER CONTACT NOTE (OTHER) - ASSESSMENT
pt bladder scan showed >500mL, tried to straight cath, however resistance was met
pt is resting in bed, nonverbal & unable to assess orientation status
SW received call from Palliative SW stating that pt needs a referral for in home hospice.  SW will put in referral to [facility] for home hospice.  SW to put in referral in [employer].
pt is yelling out in pain, BP is 92/70. want to know if it's ok to give IV dilaudid
pt has stat order for simethicone, pt is lethargic after receiving IV pain meds & don't want to give PO meds while pt is lethargic
pt is resting in bed, appears asleep. RR is 8, O2 saturation is 100% on room air
Pt was moaning and grimacing.
pt is resting in bed, decreased urine output. bladder scan preformed and showed 532mL in bladder
line with resistance, no leaking, no drainage, dressing intact, no pain

## 2021-10-09 NOTE — PROVIDER CONTACT NOTE (OTHER) - RECOMMENDATIONS
IV Tylenol
inform provider & order straight cath
inform provider that stat dose can not be given at this time
inform provider
BIANKA Toledo aware with order to reaccess site
inform provider
clarify with provider
inform provider

## 2021-10-09 NOTE — PROVIDER CONTACT NOTE (OTHER) - REASON
SW note
chemo port
pt bladder scan showed 532mL in blader
pt RR is 8
unable to straight cath pt
pt BP 89/63
pt BP 92/70, pt yelling out in pain. want to know if can give IV Dilaudid with BP <100
Darvin gutierrez
pt has stat order for simethicone, pt is lethargic after receiving IV pain meds & don't want to give PO meds while pt is lethargic

## 2021-10-10 LAB
ANION GAP SERPL CALC-SCNC: 18 MMOL/L — HIGH (ref 7–14)
APTT BLD: 97.1 SEC — HIGH (ref 27–36.3)
BUN SERPL-MCNC: 56 MG/DL — HIGH (ref 7–23)
CALCIUM SERPL-MCNC: 7.9 MG/DL — LOW (ref 8.4–10.5)
CHLORIDE SERPL-SCNC: 103 MMOL/L — SIGNIFICANT CHANGE UP (ref 98–107)
CO2 SERPL-SCNC: 16 MMOL/L — LOW (ref 22–31)
CREAT SERPL-MCNC: 1.86 MG/DL — HIGH (ref 0.5–1.3)
GLUCOSE SERPL-MCNC: 101 MG/DL — HIGH (ref 70–99)
HCT VFR BLD CALC: 29 % — LOW (ref 39–50)
HGB BLD-MCNC: 8.9 G/DL — LOW (ref 13–17)
MAGNESIUM SERPL-MCNC: 2.3 MG/DL — SIGNIFICANT CHANGE UP (ref 1.6–2.6)
MCHC RBC-ENTMCNC: 22 PG — LOW (ref 27–34)
MCHC RBC-ENTMCNC: 30.7 GM/DL — LOW (ref 32–36)
MCV RBC AUTO: 71.6 FL — LOW (ref 80–100)
NRBC # BLD: 0 /100 WBCS — SIGNIFICANT CHANGE UP
NRBC # FLD: 0.02 K/UL — HIGH
PHOSPHATE SERPL-MCNC: 4.5 MG/DL — SIGNIFICANT CHANGE UP (ref 2.5–4.5)
PLATELET # BLD AUTO: 65 K/UL — LOW (ref 150–400)
POTASSIUM SERPL-MCNC: 5.9 MMOL/L — HIGH (ref 3.5–5.3)
POTASSIUM SERPL-SCNC: 5.9 MMOL/L — HIGH (ref 3.5–5.3)
RBC # BLD: 4.05 M/UL — LOW (ref 4.2–5.8)
RBC # FLD: 21 % — HIGH (ref 10.3–14.5)
SODIUM SERPL-SCNC: 137 MMOL/L — SIGNIFICANT CHANGE UP (ref 135–145)
WBC # BLD: 12.5 K/UL — HIGH (ref 3.8–10.5)
WBC # FLD AUTO: 12.5 K/UL — HIGH (ref 3.8–10.5)

## 2021-10-10 RX ORDER — HYDROMORPHONE HYDROCHLORIDE 2 MG/ML
0.1 INJECTION INTRAMUSCULAR; INTRAVENOUS; SUBCUTANEOUS
Qty: 100 | Refills: 0 | Status: DISCONTINUED | OUTPATIENT
Start: 2021-10-10 | End: 2021-10-12

## 2021-10-10 RX ADMIN — HYDROMORPHONE HYDROCHLORIDE 0.5 MILLIGRAM(S): 2 INJECTION INTRAMUSCULAR; INTRAVENOUS; SUBCUTANEOUS at 03:10

## 2021-10-10 RX ADMIN — Medication 200 MILLIGRAM(S): at 05:50

## 2021-10-10 RX ADMIN — HYDROMORPHONE HYDROCHLORIDE 0.5 MILLIGRAM(S): 2 INJECTION INTRAMUSCULAR; INTRAVENOUS; SUBCUTANEOUS at 06:29

## 2021-10-10 RX ADMIN — Medication 200 MILLIGRAM(S): at 18:00

## 2021-10-10 RX ADMIN — HEPARIN SODIUM 900 UNIT(S)/HR: 5000 INJECTION INTRAVENOUS; SUBCUTANEOUS at 08:12

## 2021-10-10 RX ADMIN — HYDROMORPHONE HYDROCHLORIDE 0.1 MG/HR: 2 INJECTION INTRAMUSCULAR; INTRAVENOUS; SUBCUTANEOUS at 20:44

## 2021-10-10 RX ADMIN — HYDROMORPHONE HYDROCHLORIDE 0.1 MG/HR: 2 INJECTION INTRAMUSCULAR; INTRAVENOUS; SUBCUTANEOUS at 14:23

## 2021-10-10 RX ADMIN — HYDROMORPHONE HYDROCHLORIDE 0.2 MILLIGRAM(S): 2 INJECTION INTRAMUSCULAR; INTRAVENOUS; SUBCUTANEOUS at 23:24

## 2021-10-10 RX ADMIN — Medication 1 GRAM(S): at 05:50

## 2021-10-10 RX ADMIN — HYDROMORPHONE HYDROCHLORIDE 0.2 MILLIGRAM(S): 2 INJECTION INTRAMUSCULAR; INTRAVENOUS; SUBCUTANEOUS at 23:54

## 2021-10-10 RX ADMIN — HYDROMORPHONE HYDROCHLORIDE 0.1 MG/HR: 2 INJECTION INTRAMUSCULAR; INTRAVENOUS; SUBCUTANEOUS at 10:05

## 2021-10-10 RX ADMIN — HYDROMORPHONE HYDROCHLORIDE 0.5 MILLIGRAM(S): 2 INJECTION INTRAMUSCULAR; INTRAVENOUS; SUBCUTANEOUS at 10:45

## 2021-10-10 RX ADMIN — Medication 1 GRAM(S): at 23:03

## 2021-10-10 RX ADMIN — PANTOPRAZOLE SODIUM 40 MILLIGRAM(S): 20 TABLET, DELAYED RELEASE ORAL at 14:23

## 2021-10-10 NOTE — PROGRESS NOTE ADULT - ASSESSMENT
JAELYN CLARKE is a 66y Male who presents with a chief complaint of AMS    Colon Cancer  - [facility]  - Diagnosed in February 2021; last treated with FOLFOX in August 2021. Metastatic disease worsening in September 2021 with discussions pending to switch to FOLFIRI + Bevacizumab.  - No longer a candidate for further therapy; palliative care consulted for hospice referral.   - Patient is on pleasure feeds. Patient ate yesterday.   - Continue pain control with hydromorphone PCA.     Altered Mental Status/Abnormal Electrolytes  - Patient presents with progressive decline with acute kidney injury. Electrolyte disturbances with hyponatremia, hyperkalemia, hypochloremia.  - Alkaline phosphatase elevated at 1153.  - Uric acid very elevated at 14.3. In the ED had received rasburicase and IVF.  - Continue to monitor.     History of DVT  - lower ext Acute thrombus extending from the left external iliac vein to the level of the left profunda vein. Please note the proximal extent of the thrombus is not visualized.  - Patient remains on heparin drip. Will need to discuss with family on goals of care as to whether to continue heparin drip or switch to DOAC or discontinue anticoagulation all together.     Anemia, Thrombocytopenia  - PLT 65  - cont to monitor   - had been on heparin for 3 days    Salazar Ash MD  Hematology/Oncology  C: [phone]  O: [phone]

## 2021-10-10 NOTE — PROGRESS NOTE ADULT - ASSESSMENT
67 YO M with  colorectal cancer (mets to liver first dx'd Dec 2020, started chemo in Feb 2021), malignant ascites c/b SBP with abd drain in place,  and DVTs on Xarelto presented to the ED for altered mental status.   Na to 126  CTH neg   AMS likely multifactorial 2/2 toxic metabolic encephalopathy and multiple metabolic derrangements   - palliative called, hospice planning   - abx for infection per ID now on cipro   - heme/onc recs apprecaited  - ID recs appreciated   - SIRISHA, IVF  - DVT, on heparin drip   - check LFTs and ammonia   - pain control with Dilaudid  - DNR/I plan for home hospice   - spoke with family at bedside   - check FS, glucose control <180  - GI/DVT ppx  - Counseling on diet, exercise, and medication adherence was done  - Counseling on smoking cessation and alcohol consumption offered when appropriate.  - Pain assessed and judicious use of narcotics when appropriate was discussed.    - Stroke education given when appropriate.  - Importance of fall prevention discussed.   - Differential diagnosis and plan of care discussed with patient and/or family and primary team  - Thank you for allowing me to participate in the care of this patient. Call with questions.   Rico Tracy MD  Vascular Neurology  [phone]

## 2021-10-11 LAB
APTT BLD: 86.9 SEC — HIGH (ref 27–36.3)
SARS-COV-2 RNA SPEC QL NAA+PROBE: SIGNIFICANT CHANGE UP

## 2021-10-11 RX ORDER — HYDROMORPHONE HYDROCHLORIDE 2 MG/ML
250 INJECTION INTRAMUSCULAR; INTRAVENOUS; SUBCUTANEOUS
Qty: 250 | Refills: 0
Start: 2021-10-11 | End: 2021-11-09

## 2021-10-11 RX ADMIN — HYDROMORPHONE HYDROCHLORIDE 0.1 MG/HR: 2 INJECTION INTRAMUSCULAR; INTRAVENOUS; SUBCUTANEOUS at 09:00

## 2021-10-11 RX ADMIN — HYDROMORPHONE HYDROCHLORIDE 0.1 MG/HR: 2 INJECTION INTRAMUSCULAR; INTRAVENOUS; SUBCUTANEOUS at 08:44

## 2021-10-11 RX ADMIN — HYDROMORPHONE HYDROCHLORIDE 0.2 MILLIGRAM(S): 2 INJECTION INTRAMUSCULAR; INTRAVENOUS; SUBCUTANEOUS at 04:44

## 2021-10-11 RX ADMIN — HEPARIN SODIUM 900 UNIT(S)/HR: 5000 INJECTION INTRAVENOUS; SUBCUTANEOUS at 14:40

## 2021-10-11 RX ADMIN — HYDROMORPHONE HYDROCHLORIDE 0.5 MILLIGRAM(S): 2 INJECTION INTRAMUSCULAR; INTRAVENOUS; SUBCUTANEOUS at 20:16

## 2021-10-11 RX ADMIN — HYDROMORPHONE HYDROCHLORIDE 0.1 MG/HR: 2 INJECTION INTRAMUSCULAR; INTRAVENOUS; SUBCUTANEOUS at 20:48

## 2021-10-11 RX ADMIN — HYDROMORPHONE HYDROCHLORIDE 0.5 MILLIGRAM(S): 2 INJECTION INTRAMUSCULAR; INTRAVENOUS; SUBCUTANEOUS at 11:22

## 2021-10-11 RX ADMIN — HYDROMORPHONE HYDROCHLORIDE 0.2 MILLIGRAM(S): 2 INJECTION INTRAMUSCULAR; INTRAVENOUS; SUBCUTANEOUS at 15:19

## 2021-10-11 RX ADMIN — HYDROMORPHONE HYDROCHLORIDE 0.5 MILLIGRAM(S): 2 INJECTION INTRAMUSCULAR; INTRAVENOUS; SUBCUTANEOUS at 23:30

## 2021-10-11 RX ADMIN — Medication 1 GRAM(S): at 10:58

## 2021-10-11 RX ADMIN — HYDROMORPHONE HYDROCHLORIDE 0.2 MILLIGRAM(S): 2 INJECTION INTRAMUSCULAR; INTRAVENOUS; SUBCUTANEOUS at 01:46

## 2021-10-11 RX ADMIN — Medication 1 GRAM(S): at 18:42

## 2021-10-11 RX ADMIN — Medication 200 MILLIGRAM(S): at 05:32

## 2021-10-11 RX ADMIN — HYDROMORPHONE HYDROCHLORIDE 0.2 MILLIGRAM(S): 2 INJECTION INTRAMUSCULAR; INTRAVENOUS; SUBCUTANEOUS at 05:14

## 2021-10-11 RX ADMIN — Medication 200 MILLIGRAM(S): at 18:43

## 2021-10-11 RX ADMIN — PANTOPRAZOLE SODIUM 40 MILLIGRAM(S): 20 TABLET, DELAYED RELEASE ORAL at 12:26

## 2021-10-11 RX ADMIN — HYDROMORPHONE HYDROCHLORIDE 0.2 MILLIGRAM(S): 2 INJECTION INTRAMUSCULAR; INTRAVENOUS; SUBCUTANEOUS at 15:42

## 2021-10-11 RX ADMIN — HYDROMORPHONE HYDROCHLORIDE 0.5 MILLIGRAM(S): 2 INJECTION INTRAMUSCULAR; INTRAVENOUS; SUBCUTANEOUS at 23:00

## 2021-10-11 RX ADMIN — HYDROMORPHONE HYDROCHLORIDE 0.2 MILLIGRAM(S): 2 INJECTION INTRAMUSCULAR; INTRAVENOUS; SUBCUTANEOUS at 02:16

## 2021-10-11 RX ADMIN — HYDROMORPHONE HYDROCHLORIDE 0.5 MILLIGRAM(S): 2 INJECTION INTRAMUSCULAR; INTRAVENOUS; SUBCUTANEOUS at 10:59

## 2021-10-11 RX ADMIN — SODIUM CHLORIDE 50 MILLILITER(S): 9 INJECTION, SOLUTION INTRAVENOUS at 15:56

## 2021-10-11 NOTE — PROGRESS NOTE ADULT - PROBLEM SELECTOR PLAN 4
continue dilaudid to 0.2mg iv q 2 hours prn and 0.5mg iv q 2 hours prn severe pain  added iv protonix and carafate as component of gastritis  bowel regimen senna, dulcolax supp prn
likely multi-factorial- pre-renal from poor po intake and TLS  -CT a/p reviewed no hydro
likely multi-factorial- pre-renal from poor po intake and TLS  -CT a/p reviewed no hydro   - IV Hydration    -hold diuretics   -avoid nephrotoxin   -monitor I&O  -nephrology consulted, f/u recs
started dilaudid gtt at 0.1mg/hr with 0.5mg q 2 hours prn  goal is for home with hospice on pca  due to pt's inability to press button and neither family or nurse will press either- will need to maintain this regimen while in hospital  upon discharge would change to pca setting of dilaudid 0.1mg/hr with 0.5mg q 30 min prn  primary team will need to prescribe this via [employer]scripts to the pharmacy of choice by [facility] hosp[facility]
likely multi-factorial- pre-renal from poor po intake and TLS  -CT a/p reviewed no hydro   - IV Hydration    -hold diuretics   -avoid nephrotoxin   -monitor I&O  -nephrology consulted, f/u recs
decreased dilaudid to 0.2mg iv q 2 hours prn at request of family as pt was too "lethargic"  added iv protonix and carafate as component of gastritis  bowel regimen senna, dulcolax supp prn
likely multi-factorial- pre-renal from poor po intake and TLS  -CT a/p reviewed no hydro   -s/p 2L LR in ER, f/u repeat Cr. will order maintenance fluid based on repeat BMP   -hold diuretics   -avoid nephrotoxin   -monitor I&O  -nephrology consulted, f/u recs
likely multi-factorial- pre-renal from poor po intake and TLS  -CT a/p reviewed no hydro   - IV Hydration    -hold diuretics   -avoid nephrotoxin   -monitor I&O  -nephrology consulted, f/u recs
likely multi-factorial- pre-renal from poor po intake and TLS  -CT a/p reviewed no hydro
likely multi-factorial- pre-renal from poor po intake and TLS  -CT a/p reviewed no hydro   - IV Hydration    -hold diuretics   -avoid nephrotoxin   -monitor I&O  -nephrology consulted, f/u recs

## 2021-10-11 NOTE — PROGRESS NOTE ADULT - PROBLEM SELECTOR PROBLEM 1
Metabolic encephalopathy
SBP (spontaneous bacterial peritonitis)
Metabolic encephalopathy
Metabolic encephalopathy
SBP (spontaneous bacterial peritonitis)
SBP (spontaneous bacterial peritonitis)
SIRISHA (acute kidney injury)
Metabolic encephalopathy

## 2021-10-11 NOTE — PROGRESS NOTE ADULT - PROBLEM SELECTOR PROBLEM 5
DVT, lower extremity
Palliative care encounter
Palliative care encounter
DVT, lower extremity
Palliative care encounter
DVT, lower extremity

## 2021-10-11 NOTE — PROGRESS NOTE ADULT - PROBLEM SELECTOR PROBLEM 3
SBP (spontaneous bacterial peritonitis)
SBP (spontaneous bacterial peritonitis)
Metastatic colon cancer to liver
SBP (spontaneous bacterial peritonitis)
Metastatic colon cancer to liver
SBP (spontaneous bacterial peritonitis)
Metastatic colon cancer to liver
SBP (spontaneous bacterial peritonitis)

## 2021-10-11 NOTE — DISCHARGE NOTE PROVIDER - DETAILS OF MALNUTRITION DIAGNOSIS/DIAGNOSES
This patient has been assessed with a concern for Malnutrition and was treated during this hospitalization for the following Nutrition diagnosis/diagnoses:     -  10/06/2021: Severe protein-calorie malnutrition   -  10/06/2021: Underweight (BMI < 19)

## 2021-10-11 NOTE — PROGRESS NOTE ADULT - NUTRITIONAL ASSESSMENT
This patient has been assessed with a concern for Malnutrition and has been determined to have a diagnosis/diagnoses of Severe protein-calorie malnutrition and Underweight (BMI < 19).    This patient is being managed with:   Diet NPO-  Entered: Oct  6 2021  5:43PM    
This patient has been assessed with a concern for Malnutrition and has been determined to have a diagnosis/diagnoses of Severe protein-calorie malnutrition and Underweight (BMI < 19).    This patient is being managed with:   Diet Dysphagia 1 Pureed-Honey Consistency Fluid-  Supplement Feeding Modality:  Oral  Ensure Enlive Servings Per Day:  2       Frequency:  Two Times a day  Entered: Oct  9 2021  2:54PM    
This patient has been assessed with a concern for Malnutrition and has been determined to have a diagnosis/diagnoses of Severe protein-calorie malnutrition and Underweight (BMI < 19).    This patient is being managed with:   Diet Dysphagia 1 Pureed-Honey Consistency Fluid-  Entered: Oct  7 2021 11:48AM    
This patient has been assessed with a concern for Malnutrition and has been determined to have a diagnosis/diagnoses of Severe protein-calorie malnutrition and Underweight (BMI < 19).    This patient is being managed with:   Diet Dysphagia 1 Pureed-Honey Consistency Fluid-  Supplement Feeding Modality:  Oral  Ensure Enlive Servings Per Day:  2       Frequency:  Two Times a day  Entered: Oct  9 2021  2:54PM    
This patient has been assessed with a concern for Malnutrition and has been determined to have a diagnosis/diagnoses of Severe protein-calorie malnutrition and Underweight (BMI < 19).    This patient is being managed with:   Diet Dysphagia 1 Pureed-Honey Consistency Fluid-  Entered: Oct  7 2021 11:48AM    
This patient has been assessed with a concern for Malnutrition and has been determined to have a diagnosis/diagnoses of Severe protein-calorie malnutrition and Underweight (BMI < 19).    This patient is being managed with:   Diet Dysphagia 1 Pureed-Honey Consistency Fluid-  Supplement Feeding Modality:  Oral  Ensure Enlive Servings Per Day:  2       Frequency:  Two Times a day  Entered: Oct  9 2021  2:54PM

## 2021-10-11 NOTE — DISCHARGE NOTE PROVIDER - HOSPITAL COURSE
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 Ciprofloxacin 400 IV q12h for 5-7 days thru 10/14. This would also serve as SBP ppx as well.     - As per attg, can discontinue antibiotics and anticoagulants upon discharge    Plan - 1:  ·  Problem: Metabolic encephalopathy.   ·  Plan: likely 2/2 SIRISHA and electrolyte disturbance and progressive malignancy. and infection   trending down BUnCr     plan for home with home hospice    [facility] Care  pending home hospice placement.     Problem/Plan - 2:  ·  Problem: Tumor lysis syndrome.   ·  Plan: elevated uric acid, LDH, K, phos concerning for TLS  -received 2L LR, Rasburicase, calcium gluconate/insulin/D50 in ER  - COnt iV Hydration    Stop blood work.     Problem/Plan - 3:  ·  Problem: SBP (spontaneous bacterial peritonitis).   ·  Plan: hx of SBP on cipro ppx.     Problem/Plan - 4:  ·  Problem: SIRISHA (acute kidney injury).   ·  Plan: likely multi-factorial- pre-renal from poor po intake and tumor lysis syndrome  -CT a/p reviewed no hydro.    5. Hx of DVT, lower extremity  -  On xarelto on admission, switched to heparin gtt as patient could not tolerate PO.   - Per attending, patient will be discharged on no AC       Problem/Plan - 6:  ·  Problem: Metastatic colon cancer to liver.   ·  Plan: -CT a/p again showed colon as liver mass and ascites   -marked elevation alk phos but normal bili- CBD dilatation on CT unchanged from prior. will order RUQ US r/o biliary obstruction, low suspicion at this time   -trend LFT  -oncology consult.     Problem/Plan - 7:  ·  Problem: Severe protein-calorie malnutrition.   ·  Plan: currently with poor MS unable to take po  -nutrition consult.      Dispo- plan is for home hospice with PCA, family does not want anymore dmt. Referral to be made by CM (home hospice is having staffing issues). Pending PCA Pump Rx filled then plan for home hospice      [facility]  - DME to be delivered 10/8   RN SOC Nando 10/10 at 12 noon  *Please send 3 days of pain medications to pt's home pharmacy* - d/w ACP NP Bhavik ([id number])  [facility]    65 yo M with prostate CA s/p TURP, colorectal cancer (mets to liver first dx'd Dec 2020, started chemo in Feb 2021), malignant ascites c/b SBP with abd drain in place, and DVTs p/w abdominal pain, admitted for metabolic encephalopathy, SIRISHA and concern for tumor lysis syndrome     1. Metabolic encephalopathy  - Likely d/t SIRISHA and electrolyte disturbance, progressive malignancy, and infection     2. 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 ID recommended no removal of the abdominal drain  - Patient completed a course of Ciprofloxacin 400 IV q12h for 5 days    3. SBP (spontaneous bacterial peritonitis)  - Hx of SBP, completed ciprofloxacin ppx    4. Tumor lysis syndrome  - Elevated uric acid, LDH, K, phos concerning for TLS  - Received 2L LR, Rasburicase, calcium gluconate/insulin/D50 in ER    5. SIRISHA (acute kidney injury)  - Likely multi-factorial- pre-renal from poor po intake and tumor lysis syndrome  - CT a/p reviewed no hydro.    6. Hx of DVT, lower extremity  - On Xarelto on admission, switched to heparin gtt as patient could not tolerate PO  - Per attending, patient will be discharged on no AC as AC does not align with GOC    7. Metastatic colon cancer to liver  - CT a/p again showed colon CA with liver mass and ascites   - marked elevation alk phos but normal bili - CBD dilatation on CT unchanged from prior    8. Severe protein-calorie malnutrition.   - Due to poor mental status, unable to take po  - nutrition consulted and provided dietary recommendations    Dispo- plan is for home hospice with [facility], with PCA on Dilaudid    Patient seen and evaluated, no acute somatic complaints. Reviewed discharge medications with patient; All new medications requiring new prescriptions were sent to the pharmacy of patient's choice. Reviewed need for prescription for previous home medications and new prescriptions sent if requested. Medically cleared/stable for discharge as per Dr. Villa on 10/12/2021 with close outpatient follow up within 1-2 weeks of discharge. Patient understands and agrees with plan of care.         *Please send 3 days of pain medications to pt's home pharmacy* - d/w ACP NP Bhavik ([id number])*******************  [facility]    65 yo M with prostate CA s/p TURP, colorectal cancer (mets to liver first dx'd Dec 2020, started chemo in Feb 2021), malignant ascites c/b SBP with abd drain in place, and DVTs p/w abdominal pain, admitted for metabolic encephalopathy, SIRISHA and concern for tumor lysis syndrome     1. Metabolic encephalopathy  - Likely d/t SIRISHA and electrolyte disturbance, progressive malignancy, and infection     2. 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 ID recommended no removal of the abdominal drain  - Patient completed a course of Ciprofloxacin 400 IV q12h for 5 days    3. SBP (spontaneous bacterial peritonitis)  - Hx of SBP, completed ciprofloxacin ppx    4. Tumor lysis syndrome  - Elevated uric acid, LDH, K, phos concerning for TLS  - Received 2L LR, Rasburicase, calcium gluconate/insulin/D50 in ER    5. SIRISHA (acute kidney injury)  - Likely multi-factorial- pre-renal from poor po intake and tumor lysis syndrome  - CT a/p reviewed no hydro.    6. Hx of DVT, lower extremity  - On Xarelto on admission, switched to heparin gtt as patient could not tolerate PO  - Per attending, patient will be discharged on no AC as AC does not align with GOC    7. Metastatic colon cancer to liver  - CT a/p again showed colon CA with liver mass and ascites   - marked elevation alk phos but normal bili - CBD dilatation on CT unchanged from prior    8. Severe protein-calorie malnutrition.   - Due to poor mental status, unable to take po  - nutrition consulted and provided dietary recommendations    Dispo- plan is for home hospice with [facility], with PCA on Dilaudid    Patient seen and evaluated, no acute somatic complaints. Reviewed discharge medications with patient; All new medications requiring new prescriptions were sent to the pharmacy of patient's choice. Reviewed need for prescription for previous home medications and new prescriptions sent if requested. Medically cleared/stable for discharge as per Dr. Villa on 10/12/2021 with close outpatient follow up within 1-2 weeks of discharge. Patient understands and agrees with plan of care.

## 2021-10-11 NOTE — PROGRESS NOTE ADULT - PROBLEM SELECTOR PROBLEM 4
SIRISHA (acute kidney injury)
SIRISHA (acute kidney injury)
Pain due to neoplasm
SIRISHA (acute kidney injury)
Pain due to neoplasm
SIRISHA (acute kidney injury)
SIRISHA (acute kidney injury)
Pain due to neoplasm
SIRISHA (acute kidney injury)
SIRISHA (acute kidney injury)

## 2021-10-11 NOTE — PROGRESS NOTE ADULT - PROBLEM SELECTOR PLAN 1
likely 2/2 SIRISHA and electrolyte disturbance and progressive malignancy. and infection   -CT head no acute pathology  -check ammonia  elevated   -treat SIRISHA, TLS as possible SBP as below  -monitor MS closely    palliative eval appreciated  plan for home with home hospice  MSK no further treatment offer
ID appreciated  abx as recommended  continue to remove ascites as tolerated and for comfort
Pt with hemodynamically mediated SIRISHA in setting of volume depletion, NSAIDs (got toradol IV here) and possible TLS. Exact duration of SIRISHA however unknown. Patient noted to have normal Scr on 1.02 on 7/23/21. Then Scr increased to 2.23 with K of 5.8 on 10/3/21, and then Scr worsened to 2.57 on 10/4/21. Last Scr improved to 2.36 today. CT abd done on this admission with no hydro. Noted to have elevated uric acid of 19.9, . Got 1 dose of rasburicase. Currently on NS at 100 cc/hr.    Recommend to send UA, urine electrolytes (urine Na, Cl and urea) and spot urine TP/CR. Continue NS at 100 cc/hr. Start on Albumin 25% q8h. Monitor TLS labs. Monitor labs and urine output. Avoid NSAIDs, ACEI/ARBS, RCA and nephrotoxins. Dose medications as per eGFR.
likely 2/2 SIRISHA and electrolyte disturbance and progressive malignancy. r/o infection  -CT head no acute pathology  -check ammonia   -treat SIRISHA, TLS as possible SBP as below  -monitor MS closely    palliative eval called for GOC and possible hospice placement
likely 2/2 SIRISHA and electrolyte disturbance and progressive malignancy. and infection   trending down BUnCr     palliative eval appreciated  plan for home with home hospice  MSK no further treatment offer    comfort are
likely 2/2 SIRISHA and electrolyte disturbance and progressive malignancy. and infection   trending down BUnCr     palliative eval appreciated  plan for home with home hospice  MSK no further treatment offer    [facility]  pending home hospice placement
likely 2/2 SIRISHA and electrolyte disturbance and progressive malignancy. and infection   trending down BUnCr     palliative eval appreciated  plan for home with home hospice  MSK no further treatment offer    comfort are
likely 2/2 SIRISHA and electrolyte disturbance and progressive malignancy. and infection   trending down BUnCr     palliative eval appreciated  plan for home with home hospice  MSK no further treatment offer    comfort are
likely 2/2 SIRISHA and electrolyte disturbance and progressive malignancy. and infection   -CT head no acute pathology  -check ammonia  elevated   -treat SIRISHA, TLS as possible SBP as below  -monitor MS closely    palliative eval appreciated  plan for home with home hospice  MSK no further treatment offer

## 2021-10-11 NOTE — PROGRESS NOTE ADULT - PROBLEM SELECTOR PLAN 2
elevated uric acid, LDH, K, phos concerning for TLS  -received 2L LR, Rasburicase, calcium gluconate/insulin/D50 in ER  - COnt iV Hydration  - monitor bicarb   - trend lactate level
iv fluid as tolerated  concern pt with hypotension now with more chronic kidney failure  son made aware  will be discontinued upon discharge
In setting of SIRISHA and NSAIDs. Also on heparin drip. Last K is 5.8. Continue medical management. Currently cannot give Lokelma b/c of AMS. Monitor K.     If any questions, please feel free to contact me     Chris Go  Nephrology Fellow  [facility] Pager: [phone]  [facility] Pager: [phone]
elevated uric acid, LDH, K, phos concerning for TLS  -received 2L LR, Rasburicase, calcium gluconate/insulin/D50 in ER  - COnt iV Hydration    Stop blood work
iv fluid as tolerated  concern pt with hypotension now with more chronic kidney failure  son made aware
elevated uric acid, LDH, K, phos concerning for TLS  -received 2L LR, Rasburicase, calcium gluconate/insulin/D50 in ER  -monitor TLS labs (BMP, LDH, Uric acid) q8  - COnt iV Hydration  - montor bicarb , start bicarb drip  - trend lactate level
iv fluid as tolerated  concern pt with hypotension now with more chronic kidney failure  son made aware
elevated uric acid, LDH, K, phos concerning for TLS  -received 2L LR, Rasburicase, calcium gluconate/insulin/D50 in ER  -monitor TLS labs (BMP, LDH, Uric acid) q8  -will consider bicarb gtt if remains acidotic  -nephrology and oncology consult appreciated
elevated uric acid, LDH, K, phos concerning for TLS  -received 2L LR, Rasburicase, calcium gluconate/insulin/D50 in ER  - COnt iV Hydration  - monitor bicarb , start bicarb drip  - trend lactate level
elevated uric acid, LDH, K, phos concerning for TLS  -received 2L LR, Rasburicase, calcium gluconate/insulin/D50 in ER  - COnt iV Hydration    Stop blood work
elevated uric acid, LDH, K, phos concerning for TLS  -received 2L LR, Rasburicase, calcium gluconate/insulin/D50 in ER  -monitor TLS labs (BMP, LDH, Uric acid) q8  - COnt iV Hydration  - montor bicarb , start bicarb drip  - trend lactate level

## 2021-10-11 NOTE — PROGRESS NOTE ADULT - PROBLEM SELECTOR PROBLEM 6
Metastatic colon cancer to liver

## 2021-10-11 NOTE — DISCHARGE NOTE PROVIDER - NSDCCPCAREPLAN_GEN_ALL_CORE_FT
PRINCIPAL DISCHARGE DIAGNOSIS  Diagnosis: Metabolic encephalopathy  Assessment and Plan of Treatment: You came in to the hospital with abdominal or belly pain with associated metabolic encephalopathy (chemical imbalance in the brain and blood leading to poor organ or brain functioning) likely as a result of a combination of a temporary decline in kidney functioning, electrolyte imbalances, malignancy from your cancer, and infection. You will follow up with your PCP to further monitor you.      SECONDARY DISCHARGE DIAGNOSES  Diagnosis: SIRISHA (acute kidney injury)  Assessment and Plan of Treatment: You had a SIRISHA (acute kidney injury), which is a temporary decline in kidney functioning. This likely d/t poor oral intake and possible tumor lysis syndrome. You had a picture of your abdomen and pelvis via a CT scan which showed no evidence of hydronephrosis (excess of fluid in the kidney due to a backup of urine). Please follow up with a nephrologist or pcp.    Diagnosis: Tumor lysis syndrome  Assessment and Plan of Treatment: We were concerned that you may possible have had tumor lysis syndrome which is the result of a large number of cancel cells dying within a short time period, releasing their contents in the blood based on your labwork. You received 2L lactated ringers (IV fluids), Rasburicase, calcium gluconate/insulin/D50 in the ER on admission. Please follow up with your pcp or oncologist for further management.    Diagnosis: SBP (spontaneous bacterial peritonitis)  Assessment and Plan of Treatment: You have a history of SBP (spontaneous bacterial peritonitis) an infection of the abdominal cavity fluid, also known as ascites. Your catheter was also found to have chryseobacterium colonizing your catheter, however infectious disease reported that yocompleted ciprofloxacin ppx  2. 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 ID recommended no removal of the abdominal drain  - Patient completed a course of Ciprofloxacin 400 IV q12h for 5 days      Diagnosis: Metastatic colon cancer to liver  Assessment and Plan of Treatment: 7. Metastatic colon cancer to liver  - CT a/p again showed colon CA with liver mass and ascites   - marked elevation alk phos but normal bili - CBD dilatation on CT unchanged from prior  colorectal cancer (mets to liver first dx'd Dec 2020, started chemo in Feb 2021), malignant ascites c/b SBP with abd drain in place p/w abdominal pain,   Dispo- plan is for home hospice with [facility], with PCA on Dilaudid    Diagnosis: Severe protein-calorie malnutrition  Assessment and Plan of Treatment: 8. Severe protein-calorie malnutrition.   - Due to poor mental status, unable to take po  - nutrition consulted and provided dietary recommendations    Diagnosis: DVT, lower extremity  Assessment and Plan of Treatment: 6. Hx of DVT, lower extremity  - On Xarelto on admission, switched to heparin gtt as patient could not tolerate PO  - Per attending, patient will be discharged on no AC as AC does not align with GOC     PRINCIPAL DISCHARGE DIAGNOSIS  Diagnosis: Metabolic encephalopathy  Assessment and Plan of Treatment: You came in to the hospital with abdominal or belly pain with associated metabolic encephalopathy (chemical imbalance in the brain and blood leading to poor organ or brain functioning) likely as a result of a combination of a temporary decline in kidney functioning, electrolyte imbalances, malignancy from your cancer, and infection. You will follow up with your PCP to further monitor you.      SECONDARY DISCHARGE DIAGNOSES  Diagnosis: SIRISHA (acute kidney injury)  Assessment and Plan of Treatment: You had a SIRISHA (acute kidney injury), which is a temporary decline in kidney functioning. This likely d/t poor oral intake and possible tumor lysis syndrome. You had a picture of your abdomen and pelvis via a CT scan which showed no evidence of hydronephrosis (excess of fluid in the kidney due to a backup of urine). Please follow up with a nephrologist or pcp.    Diagnosis: Tumor lysis syndrome  Assessment and Plan of Treatment: We were concerned that you may possible have had tumor lysis syndrome which is the result of a large number of cancel cells dying within a short time period, releasing their contents in the blood based on your labwork. You received 2L lactated ringers (IV fluids), Rasburicase, calcium gluconate/insulin/D50 in the ER on admission. Please follow up with your pcp or oncologist for further management.    Diagnosis: SBP (spontaneous bacterial peritonitis)  Assessment and Plan of Treatment: You have a history of SBP (spontaneous bacterial peritonitis) an infection of the abdominal cavity fluid, also known as ascites. Your catheter was also found to have chryseobacterium colonizing your catheter, however infectious disease reported that this in itself was not contributing to your infectious state. For both, you completed a course of the antibiotic, ciprofloxacin ppx. You will follow up with your PCP for further monitoring.       Diagnosis: Metastatic colon cancer to liver  Assessment and Plan of Treatment: You have a history of colon cancer with metastasis or spread to the liver. A picture taken on CT of your abdomen and pelvis confirmed the cancer with ascites or fluid build up in the abdominal cavity. You will receive further care at home hospice with [facility] and you will be receiving Dilaudid via the PCA pump for pain control.    Diagnosis: Severe protein-calorie malnutrition  Assessment and Plan of Treatment: You were found to have malnutrition due to poor oral intake. Nutrition was consulted during your stay and monitored your diet.    Diagnosis: DVT, lower extremity  Assessment and Plan of Treatment: You have a history of blood clots in your legs. You came in on the medication xarelto, but since you could tolerate it orally, we switched you to a heparin drip. You will not be on anticoagulation after discharge as per your goals of care

## 2021-10-11 NOTE — PROGRESS NOTE ADULT - PROBLEM SELECTOR PLAN 7
currently with poor MS unable to take po  -nutrition consult
currently with poor MS unable to take po  -start iv maintenance fluid  -nutrition consult
currently with poor MS unable to take po  -start iv maintenance fluid  -nutrition consult
currently with poor MS unable to take po  -nutrition consult
currently with poor MS unable to take po  -start iv maintenance fluid  -nutrition consult

## 2021-10-11 NOTE — DISCHARGE NOTE PROVIDER - NSDCMRMEDTOKEN_GEN_ALL_CORE_FT
acetaminophen 325 mg oral tablet: 2 tab(s) orally every 6 hours, As needed, Temp greater or equal to 38.5C (101.3F), Mild Pain (1 - 3)  ciprofloxacin 500 mg oral tablet: 1 tab(s) orally once a day   docusate sodium 100 mg oral capsule: 2 cap(s) orally once a day (at bedtime)  furosemide 40 mg oral tablet: 1 tab(s) orally once a day  melatonin 3 mg oral tablet: 1 tab(s) orally once a day (at bedtime), As needed, Insomnia  PCA pump:   Protonix 40 mg oral delayed release tablet: 1 tab(s) orally once a day   spironolactone 50 mg oral tablet: 2 tab(s) orally once a day   Xarelto 20 mg oral tablet: 1 tab(s) orally once a day (in the evening), RESUME ON 7/24/2021   acetaminophen 325 mg oral tablet: 2 tab(s) orally every 6 hours, As needed, Temp greater or equal to 38.5C (101.3F), Mild Pain (1 - 3)  ciprofloxacin 500 mg oral tablet: 1 tab(s) orally once a day   docusate sodium 100 mg oral capsule: 2 cap(s) orally once a day (at bedtime)  furosemide 40 mg oral tablet: 1 tab(s) orally once a day  HYDROmorphone compounding powder: 250 milliliter(s) compounding once a day   PCA: Basal rate: 0.1mg/hr, Bolus rate: 0.5mg every 30 minutes. MDD:15mg  melatonin 3 mg oral tablet: 1 tab(s) orally once a day (at bedtime), As needed, Insomnia  PCA pump:   Protonix 40 mg oral delayed release tablet: 1 tab(s) orally once a day   spironolactone 50 mg oral tablet: 2 tab(s) orally once a day   Xarelto 20 mg oral tablet: 1 tab(s) orally once a day (in the evening), RESUME ON 7/24/2021   acetaminophen 325 mg oral tablet: 2 tab(s) orally every 6 hours, As needed, Temp greater or equal to 38.5C (101.3F), Mild Pain (1 - 3)  docusate sodium 100 mg oral capsule: 2 cap(s) orally once a day (at bedtime)  furosemide 40 mg oral tablet: 1 tab(s) orally once a day  HYDROmorphone compounding powder: 250 milliliter(s) compounding once a day   PCA: Basal rate: 0.1mg/hr, Bolus rate: 0.5mg every 30 minutes. MDD:15mg  melatonin 3 mg oral tablet: 1 tab(s) orally once a day (at bedtime), As needed, Insomnia  Protonix 40 mg oral delayed release tablet: 1 tab(s) orally once a day   spironolactone 50 mg oral tablet: 2 tab(s) orally once a day   sucralfate 1 g/10 mL oral suspension: 10 milliliter(s) orally 2 times a day

## 2021-10-11 NOTE — DISCHARGE NOTE PROVIDER - CARE PROVIDER_API CALL
HARRISON CAMPBELL  Internal Medicine  [address] [place] [address]  Phone: [phone]  Fax: [phone])-  Follow Up Time:

## 2021-10-11 NOTE — CHART NOTE - NSCHARTNOTEFT_GEN_A_CORE
Pt to be d/c with home hospice and PCA  Discussed with primary team  Page for uncontrolled symptoms [phone]

## 2021-10-11 NOTE — PROGRESS NOTE ADULT - ASSESSMENT
Call placed to patient to relay MD's note. Message left to return office call.     JAELYN CLARKE is a 66y Male who presents with a chief complaint of AMS    Colon Cancer  - Follows with [facility]  - Diagnosed in February 2021; last treated with FOLFOX in August 2021. Metastatic disease worsening in September 2021 with discussions pending to switch to FOLFIRI + Bevacizumab.  - No longer a candidate for further therapy; palliative care consulted for hospice referral.   - Patient is on pleasure feeds. Patient ate yesterday.   - Continue pain control with hydromorphone PCA. Mental status improved with adjustments to PCA.     Altered Mental Status/Abnormal Electrolytes  - Patient presents with progressive decline with acute kidney injury. Electrolyte disturbances with hyponatremia, hyperkalemia, hypochloremia.  - Alkaline phosphatase elevated at 1153.  - Uric acid very elevated at 14.3. In the ED had received rasburicase and IVF.  - Continue to monitor.     History of DVT  - lower ext Acute thrombus extending from the left external iliac vein to the level of the left profunda vein. Please note the proximal extent of the thrombus is not visualized.  - Patient remains on heparin drip. Will need to discuss with family on goals of care as to whether to continue heparin drip or switch to DOAC or discontinue anticoagulation all together. Discussed with son briefly on this today.    Anemia, Thrombocytopenia  - PLT 65  - cont to monitor     Salazar Ash MD  Hematology/Oncology  C: [phone]  O: [phone]

## 2021-10-11 NOTE — PROGRESS NOTE ADULT - PROBLEM SELECTOR PLAN 3
no further dmt
hx of SBP on cipro ppx  -ascitics fluid PMN ~200, not c/w SBP from cell count criteria. will f/u cx  -s/p ceftriaxone in ER. will continue as pt unable to take home po cipro at this time  -will consider removing more ascitic fluid for comfort once BP is stable
hx of SBP on cipro ppx
hx of SBP on cipro ppx
no further dmt
no further dmt
hx of SBP on cipro ppx
hx of SBP on cipro ppx  -ascitics fluid PMN ~200, not c/w SBP from cell count criteria. will f/u cx  -s/p ceftriaxone in ER. will continue as pt unable to take home po cipro at this time  -will consider removing more ascitic fluid for comfort once BP is stable  - albumin per renal
hx of SBP on cipro ppx
hx of SBP on cipro ppx  -ascitics fluid PMN ~200, not c/w SBP from cell count criteria. will f/u cx  -s/p ceftriaxone in ER. will continue as pt unable to take home po cipro at this time  -will consider removing more ascitic fluid for comfort once BP is stable  - albumin per renal

## 2021-10-11 NOTE — PROGRESS NOTE ADULT - PROBLEM SELECTOR PROBLEM 2
SIRISHA (acute kidney injury)
SIRISHA (acute kidney injury)
Tumor lysis syndrome
Hyperkalemia
SIRISHA (acute kidney injury)
Tumor lysis syndrome

## 2021-10-11 NOTE — PROGRESS NOTE ADULT - PROBLEM SELECTOR PLAN 6
-CT a/p again showed colon as liver mass and ascites   -marked elevation alk phos but normal bili- CBD dilatation on CT unchanged from prior. will order RUQ US r/o biliary obstruction, low suspicion at this time   -trend LFT  -oncology consult
-CT a/p again showed colon as liver mass and ascites   -marked elevation alk phos but normal bili- CBD dilatation on CT unchanged from prior. will order RUQ US r/o biliary obstruction, low suspicion at this time   -trend LFT  -oncology consult   -consider palliative care consult   -pain control with iv Dilaudid prn
-CT a/p again showed colon as liver mass and ascites   -marked elevation alk phos but normal bili- CBD dilatation on CT unchanged from prior. will order RUQ US r/o biliary obstruction, low suspicion at this time   -trend LFT  -oncology consult   -consider palliative care consult   -pain control with iv Dilaudid prn    not  acandiate for PEG tube  discussed with son regarding NGT placement, high risk, will think about it aknd let us know
-CT a/p again showed colon as liver mass and ascites   -marked elevation alk phos but normal bili- CBD dilatation on CT unchanged from prior. will order RUQ US r/o biliary obstruction, low suspicion at this time   -trend LFT  -oncology consult
-CT a/p again showed colon as liver mass and ascites   -marked elevation alk phos but normal bili- CBD dilatation on CT unchanged from prior. will order RUQ US r/o biliary obstruction, low suspicion at this time   -trend LFT  -oncology consult   -consider palliative care consult   -pain control with iv Dilaudid prn    not  acandiate for PEG tube  discussed with son regarding NGT placement, high risk, will think about it aknd let us know

## 2021-10-12 VITALS
TEMPERATURE: 98 F | RESPIRATION RATE: 18 BRPM | DIASTOLIC BLOOD PRESSURE: 84 MMHG | OXYGEN SATURATION: 100 % | HEART RATE: 95 BPM | SYSTOLIC BLOOD PRESSURE: 128 MMHG

## 2021-10-12 LAB — APTT BLD: 55.1 SEC — HIGH (ref 27–36.3)

## 2021-10-12 RX ORDER — SUCRALFATE 1 G
10 TABLET ORAL
Qty: 600 | Refills: 0
Start: 2021-10-12 | End: 2021-11-10

## 2021-10-12 RX ORDER — RIVAROXABAN 15 MG-20MG
1 KIT ORAL
Qty: 0 | Refills: 0 | DISCHARGE

## 2021-10-12 RX ORDER — DOCUSATE SODIUM 100 MG
2 CAPSULE ORAL
Qty: 0 | Refills: 0 | DISCHARGE

## 2021-10-12 RX ORDER — ACETAMINOPHEN 500 MG
2 TABLET ORAL
Qty: 240 | Refills: 0
Start: 2021-10-12 | End: 2021-11-10

## 2021-10-12 RX ORDER — PANTOPRAZOLE SODIUM 20 MG/1
1 TABLET, DELAYED RELEASE ORAL
Qty: 30 | Refills: 0
Start: 2021-10-12 | End: 2021-11-10

## 2021-10-12 RX ORDER — DOCUSATE SODIUM 100 MG
2 CAPSULE ORAL
Qty: 60 | Refills: 0
Start: 2021-10-12 | End: 2021-11-10

## 2021-10-12 RX ADMIN — HYDROMORPHONE HYDROCHLORIDE 0.5 MILLIGRAM(S): 2 INJECTION INTRAMUSCULAR; INTRAVENOUS; SUBCUTANEOUS at 05:35

## 2021-10-12 RX ADMIN — HYDROMORPHONE HYDROCHLORIDE 0.5 MILLIGRAM(S): 2 INJECTION INTRAMUSCULAR; INTRAVENOUS; SUBCUTANEOUS at 03:16

## 2021-10-12 RX ADMIN — HEPARIN SODIUM 1000 UNIT(S)/HR: 5000 INJECTION INTRAVENOUS; SUBCUTANEOUS at 12:04

## 2021-10-12 RX ADMIN — HYDROMORPHONE HYDROCHLORIDE 0.1 MG/HR: 2 INJECTION INTRAMUSCULAR; INTRAVENOUS; SUBCUTANEOUS at 08:17

## 2021-10-12 RX ADMIN — HYDROMORPHONE HYDROCHLORIDE 0.5 MILLIGRAM(S): 2 INJECTION INTRAMUSCULAR; INTRAVENOUS; SUBCUTANEOUS at 05:05

## 2021-10-12 RX ADMIN — HYDROMORPHONE HYDROCHLORIDE 0.2 MILLIGRAM(S): 2 INJECTION INTRAMUSCULAR; INTRAVENOUS; SUBCUTANEOUS at 12:35

## 2021-10-12 RX ADMIN — HYDROMORPHONE HYDROCHLORIDE 0.2 MILLIGRAM(S): 2 INJECTION INTRAMUSCULAR; INTRAVENOUS; SUBCUTANEOUS at 12:26

## 2021-10-12 RX ADMIN — Medication 200 MILLIGRAM(S): at 05:05

## 2021-10-12 RX ADMIN — Medication 1 GRAM(S): at 05:05

## 2021-10-12 RX ADMIN — HYDROMORPHONE HYDROCHLORIDE 0.5 MILLIGRAM(S): 2 INJECTION INTRAMUSCULAR; INTRAVENOUS; SUBCUTANEOUS at 02:46

## 2021-10-12 NOTE — DISCHARGE NOTE NURSING/CASE MANAGEMENT/SOCIAL WORK - PATIENT PORTAL LINK FT
You can access the FollowMyHealth Patient Portal offered by [facility] by registering at the following website: [email or web address]. By joining [facility]’s FollowMyHealth portal, you will also be able to view your health information using other applications (apps) compatible with our system.

## 2021-10-12 NOTE — CHART NOTE - NSCHARTNOTEFT_GEN_A_CORE
Patient is a 67 y/o M with PMHx colon   Dispo to Home [facility] Hospice with [facility] on PCA Pump on Dilaudid for pain management Patient is a 65 y/o M with PMHx of prostate CA s/p TURP, colorectal cancer (mets to liver first dx'd Dec 2020, started chemo in Feb 2021), malignant ascites c/b SBP with abd drain in place, and DVTs previously on Xarelto admitted for metabolic encephalopathy, SIRISHA and concern for TLS. Dispo to Home on Hospice with [facility] on PCA Pump on Dilaudid for pain management.

## 2021-10-12 NOTE — PROGRESS NOTE ADULT - PROVIDER SPECIALTY LIST ADULT
Heme/Onc
Heme/Onc
Infectious Disease
Heme/Onc
Neurology
Neurology
Heme/Onc
Internal Medicine
Nephrology
Neurology
Palliative Care
Heme/Onc
Heme/Onc
Infectious Disease
Neurology
Internal Medicine
Palliative Care
Palliative Care
Internal Medicine

## 2021-10-12 NOTE — PROGRESS NOTE ADULT - ASSESSMENT
65 YO M with  colorectal cancer (mets to liver first dx'd Dec 2020, started chemo in Feb 2021), malignant ascites c/b SBP with abd drain in place,  and DVTs on Xarelto presented to the ED for altered mental status.   Na to 126  CTH neg   AMS likely multifactorial 2/2 toxic metabolic encephalopathy and multiple metabolic derrangements   - palliative called, hospice planning   - abx for infection per ID now on cipro   - heme/onc recs apprecaited  - ID recs appreciated   - SIRISHA, IVF  - DVT, on heparin drip   - check LFTs and ammonia   - pain control with Dilaudid  - DNR/I plan for home hospice   - spoke with family at bedside   - check FS, glucose control <180  - GI/DVT ppx  - Counseling on diet, exercise, and medication adherence was done  - Counseling on smoking cessation and alcohol consumption offered when appropriate.  - Pain assessed and judicious use of narcotics when appropriate was discussed.    - Stroke education given when appropriate.  - Importance of fall prevention discussed.   - Differential diagnosis and plan of care discussed with patient and/or family and primary team  - Thank you for allowing me to participate in the care of this patient. Call with questions.   - d/c planning   Rico Tracy MD  Vascular Neurology  [phone]

## 2021-10-12 NOTE — PROGRESS NOTE ADULT - SUBJECTIVE AND OBJECTIVE BOX
Follow Up:  cancer cachexia    Interval History/ROS:  abd pain with expanding ascites    Allergies  No Known Allergies      ANTIMICROBIALS:  ciprofloxacin   IVPB 200 every 12 hours      OTHER MEDS:  MEDICATIONS  (STANDING):  bisacodyl Suppository 10 daily PRN  dextrose 40% Gel 15 once  dextrose 50% Injectable 25 once  dextrose 50% Injectable 12.5 once  dextrose 50% Injectable 25 once  glucagon  Injectable 1 once  heparin   Injectable 3500 every 6 hours PRN  heparin   Injectable 1500 every 6 hours PRN  heparin  Infusion.  <Continuous>  HYDROmorphone  Injectable 0.2 every 2 hours PRN  HYDROmorphone  Injectable 0.5 every 2 hours PRN  HYDROmorphone Infusion 0.1 <Continuous>  pantoprazole  Injectable 40 daily  sucralfate suspension 1 two times a day      Vital Signs Last 24 Hrs  T(C): 36.6 (08 Oct 2021 15:10), Max: 36.7 (08 Oct 2021 10:30)  T(F): 97.8 (08 Oct 2021 15:10), Max: 98 (08 Oct 2021 10:30)  HR: 106 (08 Oct 2021 15:10) (96 - 106)  BP: 102/66 (08 Oct 2021 15:10) (92/70 - 107/74)  BP(mean): --  RR: 18 (08 Oct 2021 15:10) (14 - 18)  SpO2: 100% (08 Oct 2021 15:10) (100% - 100%)    PHYSICAL EXAM:  General: bitemporal wasting, in discomfort from abd  Respiratory: Clear to auscultation bilaterally  CV: RRR, S1S2, no murmurs, rubs or gallops  Abdominal: tight ascites  Extremities: No edema,   Line Sites: Clear  Skin: No rash                          9.0    8.80  )-----------( 71       ( 08 Oct 2021 08:26 )             29.1       10-08    139  |  104  |  53<H>  ----------------------------<  112<H>  4.4   |  18<L>  |  1.51<H>  Creatinine: 1.51 (10-08 @ 08:26)    Creatinine: 1.62 (10-07 @ 06:57)    Creatinine: 2.11 (10-06 @ 02:57)    Creatinine: 2.17 (10-05 @ 19:41)    Creatinine: 2.25 (10-05 @ 11:58)    Creatinine: 2.36 (10-05 @ 01:21)    Creatinine: 1.28 (10-04 @ 21:45)    Creatinine: 2.35 (10-04 @ 14:25)    Creatinine: 2.57 (10-04 @ 06:52)    Creatinine: 2.23 (10-03 @ 21:03)        Ca    7.9<L>      08 Oct 2021 08:26  Phos  2.9     10-08  Mg     2.30     10-08    TPro  4.6<L>  /  Alb  2.3<L>  /  TBili  1.1  /  DBili  x   /  AST  70<H>  /  ALT  24  /  AlkPhos  906<H>  10-08      MICROBIOLOGY:  .Body Fluid Peritoneal Fluid  10-04-21   Moderate Chryseobacterium indologenes (Carbapenem Resistant)  --  Chryseobacterium indologenes (Carbapenem Resistant)      .Blood Blood  10-04-21   No growth to date.  --  --      .Blood Blood  10-04-21   No growth to date.  --  --      RADIOLOGY:    Jeffery Corbett MD; Division of Infectious Disease; Pager: [phone]; nights and weekends: [phone]
CHIEF COMPLAINT  AMS    HISTORY OF PRESENT ILLNESS  JAELYN CLARKE is a 66y Male who presents with a chief complaint of AMS    Patient states that he is doing about the same as yesterday. No other complaints.    REVIEW OF SYSTEMS  A complete review of systems was performed; negative except per HPI    PHYSICAL EXAM  T(C): 36.9 (10-11-21 @ 05:55), Max: 36.9 (10-11-21 @ 05:55)  HR: 100 (10-11-21 @ 05:55) (98 - 103)  BP: 101/81 (10-11-21 @ 05:55) (98/82 - 108/87)  RR: 17 (10-11-21 @ 05:55) (17 - 19)  SpO2: 100% (10-11-21 @ 05:55) (100% - 100%)  Constitutional: alert, awake, in no acute distress  Eyes: PERRL, EOMI  HEENT: normocephalic, atraumatic  Neck: supple, non-tender  Cardiovascular: normal perfusion, 2-3+ peripheral edema  Respiratory: normal respiratory efforts; no increased use of accessory muscles  Gastrointestinal: soft, non-tender  Neurological: alert, CN II to XI grossly intact  Skin: warm, dry    LABORATORY DATA                        8.9    12.50 )-----------( 65       ( 10 Oct 2021 06:43 )             29.0     10-10    137  |  103  |  56<H>  ----------------------------<  101<H>  5.9<H>   |  16<L>  |  1.86<H>    Ca    7.9<L>      10 Oct 2021 06:43  Phos  4.5     10-10  Mg     2.30     10-10  
[facility] DIVISION OF KIDNEY DISEASES AND HYPERTENSION -- FOLLOW UP NOTE  --------------------------------------------------------------------------------  Patient is a 65 y/o M w PMH of colorectal cancer with mets, malignant ascites c/b SBP and DVT on xarelto presented to [facility] for altered mental status. Nephrology consulted for SIRISHA and possible TLS. [facility], patient noted to have normal Scr on 1.02 on 7/23/21. Then Scr increased to 2.23 with K of 5.8 on 10/3/21, and then Scr worsened to 2.57 on 10/4/21. Last Scr improved to 2.36, but K elevated at 5.8 today.    Patient was seen and examined at bedside. Appears confused. Unable to obtain ROS.    PAST HISTORY  --------------------------------------------------------------------------------  No significant changes to PMH, PSH, FHx, SHx, unless otherwise noted    ALLERGIES & MEDICATIONS  --------------------------------------------------------------------------------  Allergies    No Known Allergies    Intolerances      Standing Inpatient Medications  albumin human 25% IVPB 50 milliLiter(s) IV Intermittent every 8 hours  cefTRIAXone   IVPB 1000 milliGRAM(s) IV Intermittent every 24 hours  dextrose 40% Gel 15 Gram(s) Oral once  dextrose 50% Injectable 25 Gram(s) IV Push once  dextrose 50% Injectable 12.5 Gram(s) IV Push once  dextrose 50% Injectable 25 Gram(s) IV Push once  glucagon  Injectable 1 milliGRAM(s) IntraMuscular once  heparin  Infusion.  Unit(s)/Hr IV Continuous <Continuous>  sodium chloride 0.9%. 1000 milliLiter(s) IV Continuous <Continuous>    PRN Inpatient Medications  heparin   Injectable 3500 Unit(s) IV Push every 6 hours PRN  heparin   Injectable 1500 Unit(s) IV Push every 6 hours PRN  HYDROmorphone  Injectable 0.5 milliGRAM(s) IV Push every 4 hours PRN    REVIEW OF SYSTEMS  --------------------------------------------------------------------------------  Unable to obtain ROS    VITALS/PHYSICAL EXAM  --------------------------------------------------------------------------------  T(C): 36.3 (10-05-21 @ 08:21), Max: 36.4 (10-04-21 @ 16:13)  HR: 87 (10-05-21 @ 08:21) (84 - 97)  BP: 101/81 (10-05-21 @ 08:21) (97/75 - 101/84)  RR: 17 (10-05-21 @ 08:21) (16 - 17)  SpO2: 100% (10-05-21 @ 08:21) (96% - 100%)  Wt(kg): --  Height (cm): 167.6 (10-04-21 @ 11:32)  Weight (kg): 45.4 (10-04-21 @ 11:32)  BMI (kg/m2): 16.2 (10-04-21 @ 11:32)  BSA (m2): 1.49 (10-04-21 @ 11:32)    Physical Exam:  	Gen: cachetic, ill appearing   	HEENT: dry oral mucosa  	Pulm: CTA B/L, no crackles   	CV: S1S2  	Abd: Soft, +BS   	Ext: No LE edema B/L  	Neuro: confused, somnolent but arousable   	Skin: Warm and dry              : condom cath with dark yellow urine     LABS/STUDIES  --------------------------------------------------------------------------------              9.9    10.05 >-----------<  112      [10-05-21 @ 04:32]              30.6     126  |  92  |  83  ----------------------------<  98      [10-05-21 @ 01:21]  5.8   |  25  |  2.36        Ca     8.5     [10-05-21 @ 01:21]      Mg     2.50     [10-05-21 @ 01:21]      Phos  4.3     [10-05-21 @ 01:21]    TPro  5.0  /  Alb  2.1  /  TBili  1.1  /  DBili  x   /  AST  74  /  ALT  27  /  AlkPhos  1027  [10-05-21 @ 01:21]      PTT: 41.6       [10-05-21 @ 04:32]    Uric acid 9.1      [10-05-21 @ 01:21]        [10-04-21 @ 21:45]  Serum Osmolality 294      [10-03-21 @ 22:48]    Creatinine Trend:  SCr 2.36 [10-05 @ 01:21]  SCr 1.28 [10-04 @ 21:45]  SCr 2.35 [10-04 @ 14:25]  SCr 2.57 [10-04 @ 06:52]  SCr 2.23 [10-03 @ 21:03]        Iron 88, TIBC 239, %sat 37      [07-16-21 @ 10:08]  Ferritin 979      [07-16-21 @ 10:08]      
Neurology Progress Note    S: Patient seen and examined. No new events overnight.family bedside/son. just got dilauded     Medication:  MEDICATIONS  (STANDING):  ciprofloxacin   IVPB 200 milliGRAM(s) IV Intermittent every 12 hours  dextrose 40% Gel 15 Gram(s) Oral once  dextrose 5% + sodium chloride 0.9%. 1000 milliLiter(s) (100 mL/Hr) IV Continuous <Continuous>  dextrose 50% Injectable 25 Gram(s) IV Push once  dextrose 50% Injectable 12.5 Gram(s) IV Push once  dextrose 50% Injectable 25 Gram(s) IV Push once  glucagon  Injectable 1 milliGRAM(s) IntraMuscular once  heparin  Infusion.  Unit(s)/Hr (8 mL/Hr) IV Continuous <Continuous>  HYDROmorphone Infusion 0.1 mG/Hr (0.5 mL/Hr) IV Continuous <Continuous>  lidocaine 2% Jelly 10 milliLiter(s) IntraUrethral once  pantoprazole  Injectable 40 milliGRAM(s) IV Push daily  sodium bicarbonate  Infusion 0.083 mEq/kG/Hr (50 mL/Hr) IV Continuous <Continuous>  sucralfate suspension 1 Gram(s) Oral two times a day    MEDICATIONS  (PRN):  bisacodyl Suppository 10 milliGRAM(s) Rectal daily PRN Constipation  heparin   Injectable 3500 Unit(s) IV Push every 6 hours PRN For aPTT less than 40  heparin   Injectable 1500 Unit(s) IV Push every 6 hours PRN For aPTT between 40 - 57  HYDROmorphone  Injectable 0.2 milliGRAM(s) IV Push every 2 hours PRN Moderate Pain (4 - 6)  HYDROmorphone  Injectable 0.5 milliGRAM(s) IV Push every 2 hours PRN Severe Pain (7 - 10)    Vitals:  Vital Signs Last 24 Hrs  T(C): 36.6 (10-08-21 @ 15:10), Max: 36.7 (10-08-21 @ 10:30)  T(F): 97.8 (10-08-21 @ 15:10), Max: 98 (10-08-21 @ 10:30)  HR: 106 (10-08-21 @ 15:10) (96 - 106)  BP: 102/66 (10-08-21 @ 15:10) (92/70 - 107/74)  BP(mean): --  RR: 18 (10-08-21 @ 15:10) (14 - 18)  SpO2: 100% (10-08-21 @ 15:10) (100% - 100%)    )    General Exam:   General Appearance: Appropriately dressed and in no acute distress       Head: Normocephalic, atraumatic and no dysmorphic features  Ear, Nose, and Throat: Moist mucous membranes  CVS: S1S2+  Resp: No SOB, no wheeze or rhonchi  GI: soft NT/ND  Extremities: No edema or cyanosis  Skin: No bruises or rashes     Neurological Exam:  Mental Status: lethargic, minimal verbal, not following   Cranial Nerves: PERRL, EOMI, VFFC, sensation V1-V3 intact,  no obvious facial asymmetry, equal elevation of palate, scm/trap 5/5, tongue is midline on protrusion. no obvious papilledema on fundoscopic exam. hearing is grossly intact.   Motor: LIZARRAGA but generalized weakness   Sensation: withdraw to mild noxious   Reflexes: 1+ throughout at biceps, brachioradialis, triceps, patellars and ankles bilaterally and equal. No clonus. R toe and L toe were both downgoing.  Coordination: unable   Gait: unable       I personally reviewed the below data/images/labs:      CBC Full  -  ( 08 Oct 2021 08:26 )  WBC Count : 8.80 K/uL  RBC Count : 4.01 M/uL  Hemoglobin : 9.0 g/dL  Hematocrit : 29.1 %  Platelet Count - Automated : 71 K/uL  Mean Cell Volume : 72.6 fL  Mean Cell Hemoglobin : 22.4 pg  Mean Cell Hemoglobin Concentration : 30.9 gm/dL  Auto Neutrophil # : x  Auto Lymphocyte # : x  Auto Monocyte # : x  Auto Eosinophil # : x  Auto Basophil # : x  Auto Neutrophil % : x  Auto Lymphocyte % : x  Auto Monocyte % : x  Auto Eosinophil % : x  Auto Basophil % : x    LIVER FUNCTIONS - ( 06 Oct 2021 03:01 )  Alb: 2.5 g/dL / Pro: x     / ALK PHOS: x     / ALT: x     / AST: x     / GGT: x           PTT - ( 06 Oct 2021 18:28 )  PTT:84.6 sec  Urinalysis Basic - ( 05 Oct 2021 11:58 )    Color: Yellow / Appearance: Clear / S.020 / pH: x  Gluc: x / Ketone: Negative  / Bili: Negative / Urobili: <2 mg/dL   Blood: x / Protein: 30 mg/dL / Nitrite: Negative   Leuk Esterase: Negative / RBC: 3 /HPF / WBC 1 /HPF   Sq Epi: x / Non Sq Epi: 0 /HPF / Bacteria: Negative      EXAM:  CT BRAIN        PROCEDURE DATE:  Oct  4 2021         INTERPRETATION:  CLINICAL INDICATIONS: Altered mental status    Technique: CT of the head was performed.    Multiple contiguous axial images were acquired from the skullbase to the vertex without the administration of intravenous contrast.  Coronal and sagittal reformations were made.    COMPARISON: None available.    FINDINGS:  Prominence of the bifrontal axial space is seen which could be due to increased atrophy versus chronic subdural hematoma/hygroma. This finding measures approximately 0.6 cm in largest diameter bilaterally. Parenchymal volume loss and chronic microvascular ischemic changes are identified. There are no areas of abnormal attenuation within the brain parenchyma. There is no intraparenchymal hematoma, mass effect or midline shift. The basal cisterns are patent. No abnormal extra-axial fluid collections are present    The calvarium is intact. The visualized intraorbital compartments, paranasal sinuses and mastoid complexes appear free of acute disease.    IMPRESSION:  There is no acute hemorrhage or midline shift.    --- End of Report ---            MADI GILLILAND MD; Resident Radiologist  This document has been electronically signed.  NATALY BAUTISTA MD; Attending Radiologist  This document has been electronically signed. Oct  4 2021  9:14AM    < end of copied text >    
Patient seen and examined at bedside. in acute distress     MEDICATIONS  (STANDING):  alteplase for catheter clearance 2 milliGRAM(s) Catheter once  ciprofloxacin   IVPB 200 milliGRAM(s) IV Intermittent every 12 hours  dextrose 40% Gel 15 Gram(s) Oral once  dextrose 5% + sodium chloride 0.9%. 1000 milliLiter(s) (50 mL/Hr) IV Continuous <Continuous>  dextrose 50% Injectable 25 Gram(s) IV Push once  dextrose 50% Injectable 12.5 Gram(s) IV Push once  dextrose 50% Injectable 25 Gram(s) IV Push once  glucagon  Injectable 1 milliGRAM(s) IntraMuscular once  heparin  Infusion.  Unit(s)/Hr (8 mL/Hr) IV Continuous <Continuous>  HYDROmorphone Infusion 0.1 mG/Hr (0.1 mL/Hr) IV Continuous <Continuous>  lidocaine 2% Jelly 10 milliLiter(s) IntraUrethral once  pantoprazole  Injectable 40 milliGRAM(s) IV Push daily  sucralfate suspension 1 Gram(s) Oral two times a day    MEDICATIONS  (PRN):  bisacodyl Suppository 10 milliGRAM(s) Rectal daily PRN Constipation  heparin   Injectable 3500 Unit(s) IV Push every 6 hours PRN For aPTT less than 40  heparin   Injectable 1500 Unit(s) IV Push every 6 hours PRN For aPTT between 40 - 57  HYDROmorphone  Injectable 0.2 milliGRAM(s) IV Push every 2 hours PRN Moderate Pain (4 - 6)  HYDROmorphone  Injectable 0.5 milliGRAM(s) IV Push every 2 hours PRN Severe Pain (7 - 10)        Vital Signs Last 24 Hrs  T(C): 36.6 (12 Oct 2021 05:21), Max: 36.6 (12 Oct 2021 05:21)  T(F): 97.8 (12 Oct 2021 05:21), Max: 97.8 (12 Oct 2021 05:21)  HR: 95 (12 Oct 2021 05:21) (95 - 103)  BP: 128/84 (12 Oct 2021 05:21) (115/82 - 128/84)  BP(mean): --  RR: 18 (12 Oct 2021 05:21) (17 - 18)  SpO2: 100% (12 Oct 2021 05:21) (100% - 100%)      PHYSICAL EXAM:     GENERAL:  Appears ill, cachetic   CHEST:  CTA b/l  HEART:  S1 s2+   ABDOMEN:  distended   EXTEREMITIES:  no cyanosis,clubbing or edema                    
CHIEF COMPLAINT  AMS    HISTORY OF PRESENT ILLNESS  JAELYN CLARKE is a 66y Male who presents with a chief complaint of AMS    Son and wife at bedside. Patient states he is ok but does not answer other questions.    REVIEW OF SYSTEMS  A complete review of systems was performed; negative except per HPI    PHYSICAL EXAM  T(C): 36.4 (10-09-21 @ 05:18), Max: 36.9 (10-08-21 @ 22:00)  HR: 113 (10-09-21 @ 05:18) (103 - 113)  BP: 110/77 (10-09-21 @ 05:18) (102/66 - 110/77)  RR: 23 (10-09-21 @ 05:18) (16 - 23)  SpO2: 98% (10-09-21 @ 05:18) (98% - 100%)  Constitutional: lethargic, in no acute distress  Eyes: PERRL, EOMI  HEENT: normocephalic, atraumatic  Neck: supple, non-tender  Cardiovascular: normal perfusion, 2-3+ peripheral edema  Respiratory: normal respiratory efforts; no increased use of accessory muscles  Gastrointestinal: soft, non-tender  Skin: warm, dry, no obvious lesions    LABORATORY DATA                        9.2    9.95  )-----------( 81       ( 09 Oct 2021 06:35 )             33.7     10-09    140  |  105  |  53<H>  ----------------------------<  90  4.9   |  19<L>  |  1.62<H>    Ca    8.0<L>      09 Oct 2021 10:10  Phos  3.4     10-09  Mg     2.30     10-09    TPro  4.6<L>  /  Alb  2.3<L>  /  TBili  1.1  /  DBili  x   /  AST  70<H>  /  ALT  24  /  AlkPhos  906<H>  10-08    
CHIEF COMPLAINT  Altered Mental Status    HISTORY OF PRESENT ILLNESS  JAELYN CLARKE is a 66y Male who presents with a chief complaint of AMS.    Unable to obtain complaints from patient due to altered mental status    REVIEW OF SYSTEMS  Unable to obtain - mental status    PHYSICAL EXAM  T(C): 36.4 (10-08-21 @ 04:52), Max: 36.4 (10-07-21 @ 21:14)  HR: 96 (10-08-21 @ 04:52) (96 - 98)  BP: 103/77 (10-08-21 @ 04:52) (92/70 - 107/74)  RR: 14 (10-08-21 @ 04:52) (14 - 17)  SpO2: 100% (10-08-21 @ 04:52) (100% - 100%)  Constitutional: lethargic, in no acute distress, cachetic  Eyes: PERRL, EOMI  HEENT: normocephalic, atraumatic  Neck: supple, non-tender  Cardiovascular: normal perfusion, 2+ peripheral edema  Respiratory: normal respiratory efforts; no increased use of accessory muscles  Gastrointestinal: soft, non-tender  Skin: warm, dry    LABORATORY DATA                        9.0    8.80  )-----------( 71       ( 08 Oct 2021 08:26 )             29.1     
Patient seen and examined at bedside. pt more alert today. AOx3     MEDICATIONS  (STANDING):  cefTRIAXone   IVPB 1000 milliGRAM(s) IV Intermittent every 24 hours  dextrose 40% Gel 15 Gram(s) Oral once  dextrose 5% + sodium chloride 0.9%. 1000 milliLiter(s) (100 mL/Hr) IV Continuous <Continuous>  dextrose 50% Injectable 25 Gram(s) IV Push once  dextrose 50% Injectable 12.5 Gram(s) IV Push once  dextrose 50% Injectable 25 Gram(s) IV Push once  glucagon  Injectable 1 milliGRAM(s) IntraMuscular once  heparin  Infusion.  Unit(s)/Hr (8 mL/Hr) IV Continuous <Continuous>  lidocaine 2% Jelly 10 milliLiter(s) IntraUrethral once    MEDICATIONS  (PRN):  heparin   Injectable 3500 Unit(s) IV Push every 6 hours PRN For aPTT less than 40  heparin   Injectable 1500 Unit(s) IV Push every 6 hours PRN For aPTT between 40 - 57  HYDROmorphone  Injectable 0.5 milliGRAM(s) IV Push every 4 hours PRN Severe Pain (7 - 10)        Vital Signs Last 24 Hrs  T(C): 36.3 (06 Oct 2021 06:54), Max: 36.5 (05 Oct 2021 21:20)  T(F): 97.3 (06 Oct 2021 06:54), Max: 97.7 (05 Oct 2021 21:20)  HR: 70 (06 Oct 2021 06:54) (70 - 85)  BP: 100/72 (06 Oct 2021 06:54) (89/63 - 100/72)  BP(mean): --  RR: 14 (06 Oct 2021 06:54) (8 - 14)  SpO2: 100% (06 Oct 2021 06:54) (98% - 100%)      PHYSICAL EXAM:     GENERAL: cachectic   CHEST:  CTA b/l  HEART:  S1 s2+   ABDOMEN: +distended , pain on palaption   EXTEREMITIES:  no cyanosis,clubbing or edema  SKIN:  No rash/erythema/ecchymoses  NEURO:  Alert, oriented, no asterixis                            8.9    7.70  )-----------( 96       ( 06 Oct 2021 02:57 )             28.1       10-06    131<L>  |  95<L>  |  76<H>  ----------------------------<  100<H>  4.9   |  18<L>  |  2.11<H>    Ca    8.3<L>      06 Oct 2021 02:57  Phos  3.9     10-06  Mg     2.50     10-06    TPro  x   /  Alb  2.5<L>  /  TBili  x   /  DBili  x   /  AST  x   /  ALT  x   /  AlkPhos  x   10-06      
  Follow Up:  colonized abd drainage catheter    Interval History/ROS:   feels cold, no pain,    family asks about PEG or NasoGastric tube for feeds    Allergies  No Known Allergies    ANTIMICROBIALS:  ciprofloxacin   IVPB 400 every 12 hours      OTHER MEDS:  MEDICATIONS  (STANDING):  bisacodyl Suppository 10 daily PRN  dextrose 40% Gel 15 once  dextrose 50% Injectable 25 once  dextrose 50% Injectable 12.5 once  dextrose 50% Injectable 25 once  glucagon  Injectable 1 once  heparin   Injectable 3500 every 6 hours PRN  heparin   Injectable 1500 every 6 hours PRN  heparin  Infusion.  <Continuous>  HYDROmorphone  Injectable 0.2 every 2 hours PRN  HYDROmorphone  Injectable 0.5 every 2 hours PRN  pantoprazole  Injectable 40 daily  senna 2 at bedtime  sucralfate suspension 1 two times a day      Vital Signs Last 24 Hrs  T(C): 36.8 (07 Oct 2021 05:15), Max: 37.1 (06 Oct 2021 21:12)  T(F): 98.2 (07 Oct 2021 05:15), Max: 98.7 (06 Oct 2021 21:12)  HR: 93 (07 Oct 2021 10:31) (73 - 93)  BP: 99/64 (07 Oct 2021 10:31) (99/64 - 120/87)  BP(mean): --  RR: 15 (07 Oct 2021 10:31) (14 - 16)  SpO2: 100% (07 Oct 2021 10:31) (100% - 100%)    PHYSICAL EXAM:  General: cachectic, NAD, Non-toxic  Neurology: A&Ox3,  Respiratory: Clear to auscultation bilaterally  CV: RRR, S1S2, no murmurs, rubs or gallops  Abdominal: distended non tender,  Extremities: No edema  Line Sites: Clear  Skin: No rash                        9.1    7.18  )-----------( 73       ( 07 Oct 2021 06:57 )             28.9   WBC Count: 7.18 (10-07 @ 06:57)  WBC Count: 7.70 (10-06 @ 02:57)  WBC Count: 10.05 (10-05 @ 04:32)  WBC Count: 10.01 (10-04 @ 23:22)  WBC Count: 6.50 (10-04 @ 21:45)  WBC Count: 8.65 (10-04 @ 06:52)  WBC Count: 8.54 (10-03 @ 21:03)    134<L>  |  99  |  65<H>  ----------------------------<  157<H>  4.2   |  19<L>  |  1.62<H>    Ca    7.8<L>      07 Oct 2021 06:57  Phos  3.1     10-07  Mg     2.40     10-07    TPro  x   /  Alb  2.5<L>  /  TBili  x   /  DBili  x   /  AST  x   /  ALT  x   /  AlkPhos  x   10-06          MICROBIOLOGY:  .Body Fluid Peritoneal Fluid  10-04-21   Moderate Chryseobacterium indologenes (Carbapenem Resistant)  --  Chryseobacterium indologenes (Carbapenem Resistant)      .Blood Blood  10-04-21   No growth to date.  --  --      .Blood Blood  10-04-21   No growth to date.  --  --          Jeffery Corbett MD; Division of Infectious Disease; Pager: [phone]; nights and weekends: [phone]      
Name of Patient : JAELYN CLARKE  MRN: [id number]  Date of visit: 10-10-21      Subjective: Patient seen and examined. No new events except as noted.   lethargic      MEDICATIONS:  MEDICATIONS  (STANDING):  alteplase for catheter clearance 2 milliGRAM(s) Catheter once  ciprofloxacin   IVPB 200 milliGRAM(s) IV Intermittent every 12 hours  dextrose 40% Gel 15 Gram(s) Oral once  dextrose 5% + sodium chloride 0.9%. 1000 milliLiter(s) (50 mL/Hr) IV Continuous <Continuous>  dextrose 50% Injectable 25 Gram(s) IV Push once  dextrose 50% Injectable 12.5 Gram(s) IV Push once  dextrose 50% Injectable 25 Gram(s) IV Push once  glucagon  Injectable 1 milliGRAM(s) IntraMuscular once  heparin  Infusion.  Unit(s)/Hr (8 mL/Hr) IV Continuous <Continuous>  HYDROmorphone Infusion 0.1 mG/Hr (0.1 mL/Hr) IV Continuous <Continuous>  lidocaine 2% Jelly 10 milliLiter(s) IntraUrethral once  pantoprazole  Injectable 40 milliGRAM(s) IV Push daily  sucralfate suspension 1 Gram(s) Oral two times a day      PHYSICAL EXAM:  T(C): 36.3 (10-10-21 @ 21:42), Max: 36.7 (10-10-21 @ 10:45)  HR: 99 (10-10-21 @ 21:42) (98 - 103)  BP: 103/82 (10-10-21 @ 21:42) (98/82 - 118/78)  RR: 17 (10-10-21 @ 21:42) (17 - 19)  SpO2: 100% (10-10-21 @ 21:42) (100% - 100%)  Wt(kg): --  I&O's Summary    10 Oct 2021 07:01  -  10 Oct 2021 22:28  --------------------------------------------------------  IN: 472.8 mL / OUT: 200 mL / NET: 272.8 mL          Appearance: letahrgic, cachectic   HEENT:  Dry OM   Lymphatic: No lymphadenopathy   Cardiovascular: Normal S1 S2  Respiratory: normal effort , clear  Gastrointestinal:  Soft, Non-tender  Skin: No rashes,  warm to touch  Psychiatry:  Mood & affect appropriate  Musculuskeletal: severe muscle loss      All labs, Imaging and EKGs personally reviewed     10-10-21 @ 07:01  -  10-10-21 @ 22:28  --------------------------------------------------------  IN: 472.8 mL / OUT: 200 mL / NET: 272.8 mL                          8.9    12.50 )-----------( 65       ( 10 Oct 2021 06:43 )             29.0               10-10    137  |  103  |  56<H>  ----------------------------<  101<H>  5.9<H>   |  16<L>  |  1.86<H>    Ca    7.9<L>      10 Oct 2021 06:43  Phos  4.5     10-10  Mg     2.30     10-10      PTT - ( 10 Oct 2021 06:43 )  PTT:97.1 sec                             
Name of Patient : JAELYN CLARKE  MRN: [id number]  Date of visit: 10-06-21       Subjective: Patient seen and examined. No new events except as noted.   lethargic and flair       MEDICATIONS:  MEDICATIONS  (STANDING):  ciprofloxacin   IVPB 400 milliGRAM(s) IV Intermittent every 12 hours  dextrose 40% Gel 15 Gram(s) Oral once  dextrose 5% + sodium chloride 0.9%. 1000 milliLiter(s) (100 mL/Hr) IV Continuous <Continuous>  dextrose 50% Injectable 25 Gram(s) IV Push once  dextrose 50% Injectable 12.5 Gram(s) IV Push once  dextrose 50% Injectable 25 Gram(s) IV Push once  glucagon  Injectable 1 milliGRAM(s) IntraMuscular once  heparin  Infusion.  Unit(s)/Hr (8 mL/Hr) IV Continuous <Continuous>  lidocaine 2% Jelly 10 milliLiter(s) IntraUrethral once  pantoprazole  Injectable 40 milliGRAM(s) IV Push daily  senna 2 Tablet(s) Oral at bedtime  sucralfate suspension 1 Gram(s) Oral two times a day      PHYSICAL EXAM:  T(C): 36.3 (10-06-21 @ 11:42), Max: 36.5 (10-05-21 @ 21:20)  HR: 62 (10-06-21 @ 11:42) (62 - 75)  BP: 102/68 (10-06-21 @ 11:42) (89/63 - 102/68)  RR: 12 (10-06-21 @ 11:42) (8 - 14)  SpO2: 100% (10-06-21 @ 11:42) (99% - 100%)  Wt(kg): --  I&O's Summary        Appearance: lethargic 	  HEENT: Dry OM   Lymphatic: No lymphadenopathy   Cardiovascular: Normal S1 S2  Respiratory: normal effort , clear  Gastrointestinal:  Soft, Non-tender  Skin: No rashes,  warm to touch  Psychiatry:  Mood & affect appropriate  Musculuskeletal: Muscle loss     All labs, Imaging and EKGs personally reviewed                           8.9    7.70  )-----------( 96       ( 06 Oct 2021 02:57 )             28.1               10    131<L>  |  95<L>  |  76<H>  ----------------------------<  100<H>  4.9   |  18<L>  |  2.11<H>    Ca    8.3<L>      06 Oct 2021 02:57  Phos  3.9     10-  Mg     2.50     10-    TPro  x   /  Alb  2.5<L>  /  TBili  x   /  DBili  x   /  AST  x   /  ALT  x   /  AlkPhos  x   10-    PTT - ( 06 Oct 2021 11:22 )  PTT:84.1 sec                   Urinalysis Basic - ( 05 Oct 2021 11:58 )    Color: Yellow / Appearance: Clear / S.020 / pH: x  Gluc: x / Ketone: Negative  / Bili: Negative / Urobili: <2 mg/dL   Blood: x / Protein: 30 mg/dL / Nitrite: Negative   Leuk Esterase: Negative / RBC: 3 /HPF / WBC 1 /HPF   Sq Epi: x / Non Sq Epi: 0 /HPF / Bacteria: Negative                        
Neurology Progress Note    S: Patient seen and examined. No new events overnight.family bedside     Medication:  bisacodyl Suppository 10 milliGRAM(s) Rectal daily PRN  ciprofloxacin   IVPB 400 milliGRAM(s) IV Intermittent every 12 hours  dextrose 40% Gel 15 Gram(s) Oral once  dextrose 5% + sodium chloride 0.9%. 1000 milliLiter(s) IV Continuous <Continuous>  dextrose 50% Injectable 25 Gram(s) IV Push once  dextrose 50% Injectable 12.5 Gram(s) IV Push once  dextrose 50% Injectable 25 Gram(s) IV Push once  glucagon  Injectable 1 milliGRAM(s) IntraMuscular once  heparin   Injectable 3500 Unit(s) IV Push every 6 hours PRN  heparin   Injectable 1500 Unit(s) IV Push every 6 hours PRN  heparin  Infusion.  Unit(s)/Hr IV Continuous <Continuous>  HYDROmorphone  Injectable 0.2 milliGRAM(s) IV Push every 2 hours PRN  lidocaine 2% Jelly 10 milliLiter(s) IntraUrethral once  pantoprazole  Injectable 40 milliGRAM(s) IV Push daily  senna 2 Tablet(s) Oral at bedtime  sodium bicarbonate  Infusion 0.083 mEq/kG/Hr IV Continuous <Continuous>  sucralfate suspension 1 Gram(s) Oral two times a day      Vitals:  Vital Signs Last 24 Hrs  T(C): 36.8 (06 Oct 2021 18:00), Max: 36.8 (06 Oct 2021 18:00)  T(F): 98.2 (06 Oct 2021 18:00), Max: 98.2 (06 Oct 2021 18:00)  HR: 73 (06 Oct 2021 18:00) (62 - 75)  BP: 106/76 (06 Oct 2021 18:00) (89/63 - 106/76)  BP(mean): --  RR: 14 (06 Oct 2021 18:00) (8 - 14)  SpO2: 100% (06 Oct 2021 18:00) (100% - 100%)    General Exam:   General Appearance: Appropriately dressed and in no acute distress       Head: Normocephalic, atraumatic and no dysmorphic features  Ear, Nose, and Throat: Moist mucous membranes  CVS: S1S2+  Resp: No SOB, no wheeze or rhonchi  GI: soft NT/ND  Extremities: No edema or cyanosis  Skin: No bruises or rashes     Neurological Exam:  Mental Status: lethargic, minimal verbal, not following   Cranial Nerves: PERRL, EOMI, VFFC, sensation V1-V3 intact,  no obvious facial asymmetry, equal elevation of palate, scm/trap 5/5, tongue is midline on protrusion. no obvious papilledema on fundoscopic exam. hearing is grossly intact.   Motor: LIZARRAGA but generalized weakness   Sensation: withdraw to mild noxious   Reflexes: 1+ throughout at biceps, brachioradialis, triceps, patellars and ankles bilaterally and equal. No clonus. R toe and L toe were both downgoing.  Coordination: unable   Gait: unable       I personally reviewed the below data/images/labs:      CBC Full  -  ( 06 Oct 2021 02:57 )  WBC Count : 7.70 K/uL  RBC Count : 3.98 M/uL  Hemoglobin : 8.9 g/dL  Hematocrit : 28.1 %  Platelet Count - Automated : 96 K/uL  Mean Cell Volume : 70.6 fL  Mean Cell Hemoglobin : 22.4 pg  Mean Cell Hemoglobin Concentration : 31.7 gm/dL  Auto Neutrophil # : x  Auto Lymphocyte # : x  Auto Monocyte # : x  Auto Eosinophil # : x  Auto Basophil # : x  Auto Neutrophil % : x  Auto Lymphocyte % : x  Auto Monocyte % : x  Auto Eosinophil % : x  Auto Basophil % : x    10-    131<L>  |  95<L>  |  76<H>  ----------------------------<  100<H>  4.9   |  18<L>  |  2.11<H>    Ca    8.3<L>      06 Oct 2021 02:57  Phos  3.9     10-  Mg     2.50     10-    TPro  x   /  Alb  2.5<L>  /  TBili  x   /  DBili  x   /  AST  x   /  ALT  x   /  AlkPhos  x   10-    LIVER FUNCTIONS - ( 06 Oct 2021 03:01 )  Alb: 2.5 g/dL / Pro: x     / ALK PHOS: x     / ALT: x     / AST: x     / GGT: x           PTT - ( 06 Oct 2021 18:28 )  PTT:84.6 sec  Urinalysis Basic - ( 05 Oct 2021 11:58 )    Color: Yellow / Appearance: Clear / S.020 / pH: x  Gluc: x / Ketone: Negative  / Bili: Negative / Urobili: <2 mg/dL   Blood: x / Protein: 30 mg/dL / Nitrite: Negative   Leuk Esterase: Negative / RBC: 3 /HPF / WBC 1 /HPF   Sq Epi: x / Non Sq Epi: 0 /HPF / Bacteria: Negative      EXAM:  CT BRAIN        PROCEDURE DATE:  Oct  4 2021         INTERPRETATION:  CLINICAL INDICATIONS: Altered mental status    Technique: CT of the head was performed.    Multiple contiguous axial images were acquired from the skullbase to the vertex without the administration of intravenous contrast.  Coronal and sagittal reformations were made.    COMPARISON: None available.    FINDINGS:  Prominence of the bifrontal axial space is seen which could be due to increased atrophy versus chronic subdural hematoma/hygroma. This finding measures approximately 0.6 cm in largest diameter bilaterally. Parenchymal volume loss and chronic microvascular ischemic changes are identified. There are no areas of abnormal attenuation within the brain parenchyma. There is no intraparenchymal hematoma, mass effect or midline shift. The basal cisterns are patent. No abnormal extra-axial fluid collections are present    The calvarium is intact. The visualized intraorbital compartments, paranasal sinuses and mastoid complexes appear free of acute disease.    IMPRESSION:  There is no acute hemorrhage or midline shift.    --- End of Report ---            MADI GILLILAND MD; Resident Radiologist  This document has been electronically signed.  NATALY BAUTISTA MD; Attending Radiologist  This document has been electronically signed. Oct  4 2021  9:14AM    < end of copied text >    
CHIEF COMPLAINT  Altered Mental Status    HISTORY OF PRESENT ILLNESS  JAELYN CLARKE is a 66y Male who presents with a chief complaint of AMS.    This morning patient nodded when asked whether he is doing ok but did not verbally or nod any other answers to questions. Awakes to verbal stimuli.     REVIEW OF SYSTEMS  A complete review of systems was performed; negative except per HPI    PHYSICAL EXAM  T(C): 36.4 (10-10-21 @ 05:20), Max: 36.6 (10-09-21 @ 13:12)  HR: 101 (10-10-21 @ 05:20) (101 - 114)  BP: 118/78 (10-10-21 @ 05:20) (109/78 - 118/78)  RR: 18 (10-10-21 @ 05:20) (18 - 20)  SpO2: 100% (10-10-21 @ 05:20) (97% - 100%)  Constitutional: cachetic, lethargic, in no acute distress  Eyes: PERRL, EOMI  HEENT: normocephalic, atraumatic  Neck: supple, non-tender  Cardiovascular: normal perfusion, 2-3+ peripheral edema  Respiratory: normal respiratory efforts; no increased use of accessory muscles  Gastrointestinal: soft, non-tender  Musculoskeletal: normal range of motion, no deformities noted  Skin: warm, dry    LABORATORY DATA                        8.9    12.50 )-----------( 65       ( 10 Oct 2021 06:43 )             29.0     10-10    137  |  103  |  56<H>  ----------------------------<  101<H>  5.9<H>   |  16<L>  |  1.86<H>    Ca    7.9<L>      10 Oct 2021 06:43  Phos  4.5     10-10  Mg     2.30     10-10  
Neurology Progress Note    S: Patient seen and examined. No new events overnight.     Medication:  MEDICATIONS  (STANDING):  alteplase for catheter clearance 2 milliGRAM(s) Catheter once  ciprofloxacin   IVPB 200 milliGRAM(s) IV Intermittent every 12 hours  dextrose 40% Gel 15 Gram(s) Oral once  dextrose 5% + sodium chloride 0.9%. 1000 milliLiter(s) (50 mL/Hr) IV Continuous <Continuous>  dextrose 50% Injectable 12.5 Gram(s) IV Push once  dextrose 50% Injectable 25 Gram(s) IV Push once  dextrose 50% Injectable 25 Gram(s) IV Push once  glucagon  Injectable 1 milliGRAM(s) IntraMuscular once  heparin  Infusion.  Unit(s)/Hr (8 mL/Hr) IV Continuous <Continuous>  HYDROmorphone Infusion 0.1 mG/Hr (0.1 mL/Hr) IV Continuous <Continuous>  lidocaine 2% Jelly 10 milliLiter(s) IntraUrethral once  pantoprazole  Injectable 40 milliGRAM(s) IV Push daily  sucralfate suspension 1 Gram(s) Oral two times a day    MEDICATIONS  (PRN):  bisacodyl Suppository 10 milliGRAM(s) Rectal daily PRN Constipation  heparin   Injectable 3500 Unit(s) IV Push every 6 hours PRN For aPTT less than 40  heparin   Injectable 1500 Unit(s) IV Push every 6 hours PRN For aPTT between 40 - 57  HYDROmorphone  Injectable 0.2 milliGRAM(s) IV Push every 2 hours PRN Moderate Pain (4 - 6)  HYDROmorphone  Injectable 0.5 milliGRAM(s) IV Push every 2 hours PRN Severe Pain (7 - 10)    Vitals:  Vital Signs Last 24 Hrs  T(C): 36.4 (10-10-21 @ 13:19), Max: 36.7 (10-10-21 @ 10:45)  T(F): 97.5 (10-10-21 @ 13:19), Max: 98 (10-10-21 @ 10:45)  HR: 98 (10-10-21 @ 13:19) (98 - 114)  BP: 103/88 (10-10-21 @ 13:19) (103/88 - 118/78)  BP(mean): --  RR: 19 (10-10-21 @ 13:19) (18 - 19)  SpO2: 100% (10-10-21 @ 13:19) (97% - 100%)        )    General Exam:   General Appearance: Appropriately dressed and in no acute distress       Head: Normocephalic, atraumatic and no dysmorphic features  Ear, Nose, and Throat: Moist mucous membranes  CVS: S1S2+  Resp: No SOB, no wheeze or rhonchi  GI: soft NT/ND  Extremities: No edema or cyanosis  Skin: No bruises or rashes     Neurological Exam:  Mental Status: lethargic, minimal verbal, not following   Cranial Nerves: PERRL, EOMI, VFFC, sensation V1-V3 intact,  no obvious facial asymmetry, equal elevation of palate, scm/trap 5/5, tongue is midline on protrusion. no obvious papilledema on fundoscopic exam. hearing is grossly intact.   Motor: LIZARRAGA but generalized weakness   Sensation: withdraw to mild noxious   Reflexes: 1+ throughout at biceps, brachioradialis, triceps, patellars and ankles bilaterally and equal. No clonus. R toe and L toe were both downgoing.  Coordination: unable   Gait: unable       I personally reviewed the below data/images/labs:    CBC Full  -  ( 10 Oct 2021 06:43 )  WBC Count : 12.50 K/uL  RBC Count : 4.05 M/uL  Hemoglobin : 8.9 g/dL  Hematocrit : 29.0 %  Platelet Count - Automated : 65 K/uL  Mean Cell Volume : 71.6 fL  Mean Cell Hemoglobin : 22.0 pg  Mean Cell Hemoglobin Concentration : 30.7 gm/dL  Auto Neutrophil # : x  Auto Lymphocyte # : x  Auto Monocyte # : x  Auto Eosinophil # : x  Auto Basophil # : x  Auto Neutrophil % : x  Auto Lymphocyte % : x  Auto Monocyte % : x  Auto Eosinophil % : x  Auto Basophil % : x    10-10    137  |  103  |  56<H>  ----------------------------<  101<H>  5.9<H>   |  16<L>  |  1.86<H>    Ca    7.9<L>      10 Oct 2021 06:43  Phos  4.5     10-10  Mg     2.30     10-10        EXAM:  CT BRAIN        PROCEDURE DATE:  Oct  4 2021         INTERPRETATION:  CLINICAL INDICATIONS: Altered mental status    Technique: CT of the head was performed.    Multiple contiguous axial images were acquired from the skullbase to the vertex without the administration of intravenous contrast.  Coronal and sagittal reformations were made.    COMPARISON: None available.    FINDINGS:  Prominence of the bifrontal axial space is seen which could be due to increased atrophy versus chronic subdural hematoma/hygroma. This finding measures approximately 0.6 cm in largest diameter bilaterally. Parenchymal volume loss and chronic microvascular ischemic changes are identified. There are no areas of abnormal attenuation within the brain parenchyma. There is no intraparenchymal hematoma, mass effect or midline shift. The basal cisterns are patent. No abnormal extra-axial fluid collections are present    The calvarium is intact. The visualized intraorbital compartments, paranasal sinuses and mastoid complexes appear free of acute disease.    IMPRESSION:  There is no acute hemorrhage or midline shift.    --- End of Report ---            MADI GILLILAND MD; Resident Radiologist  This document has been electronically signed.  NATALY BAUTISTA MD; Attending Radiologist  This document has been electronically signed. Oct  4 2021  9:14AM    < end of copied text >    
Neurology Progress Note    S: Patient seen and examined. No new events overnight. dc planning     Medication:  MEDICATIONS  (STANDING):  alteplase for catheter clearance 2 milliGRAM(s) Catheter once  ciprofloxacin   IVPB 200 milliGRAM(s) IV Intermittent every 12 hours  dextrose 40% Gel 15 Gram(s) Oral once  dextrose 5% + sodium chloride 0.9%. 1000 milliLiter(s) (50 mL/Hr) IV Continuous <Continuous>  dextrose 50% Injectable 25 Gram(s) IV Push once  dextrose 50% Injectable 12.5 Gram(s) IV Push once  dextrose 50% Injectable 25 Gram(s) IV Push once  glucagon  Injectable 1 milliGRAM(s) IntraMuscular once  heparin  Infusion.  Unit(s)/Hr (8 mL/Hr) IV Continuous <Continuous>  HYDROmorphone Infusion 0.1 mG/Hr (0.1 mL/Hr) IV Continuous <Continuous>  lidocaine 2% Jelly 10 milliLiter(s) IntraUrethral once  pantoprazole  Injectable 40 milliGRAM(s) IV Push daily  sucralfate suspension 1 Gram(s) Oral two times a day    MEDICATIONS  (PRN):  bisacodyl Suppository 10 milliGRAM(s) Rectal daily PRN Constipation  heparin   Injectable 3500 Unit(s) IV Push every 6 hours PRN For aPTT less than 40  heparin   Injectable 1500 Unit(s) IV Push every 6 hours PRN For aPTT between 40 - 57  HYDROmorphone  Injectable 0.2 milliGRAM(s) IV Push every 2 hours PRN Moderate Pain (4 - 6)  HYDROmorphone  Injectable 0.5 milliGRAM(s) IV Push every 2 hours PRN Severe Pain (7 - 10)      Vitals:  Vital Signs Last 24 Hrs  T(C): 36.6 (10-12-21 @ 05:21), Max: 36.6 (10-12-21 @ 05:21)  T(F): 97.8 (10-12-21 @ 05:21), Max: 97.8 (10-12-21 @ 05:21)  HR: 95 (10-12-21 @ 05:21) (95 - 103)  BP: 128/84 (10-12-21 @ 05:21) (115/82 - 128/84)  BP(mean): --  RR: 18 (10-12-21 @ 05:21) (17 - 18)  SpO2: 100% (10-12-21 @ 05:21) (100% - 100%)          )    General Exam:   General Appearance: Appropriately dressed and in no acute distress       Head: Normocephalic, atraumatic and no dysmorphic features  Ear, Nose, and Throat: Moist mucous membranes  CVS: S1S2+  Resp: No SOB, no wheeze or rhonchi  GI: soft NT/ND  Extremities: No edema or cyanosis  Skin: No bruises or rashes     Neurological Exam:  Mental Status: lethargic, minimal verbal, not following   Cranial Nerves: PERRL, EOMI, VFFC, sensation V1-V3 intact,  no obvious facial asymmetry, equal elevation of palate, scm/trap 5/5, tongue is midline on protrusion. no obvious papilledema on fundoscopic exam. hearing is grossly intact.   Motor: LIZARRAGA but generalized weakness   Sensation: withdraw to mild noxious   Reflexes: 1+ throughout at biceps, brachioradialis, triceps, patellars and ankles bilaterally and equal. No clonus. R toe and L toe were both downgoing.  Coordination: unable   Gait: unable       I personally reviewed the below data/images/labs:  no new labs   CBC Full  -  ( 10 Oct 2021 06:43 )  WBC Count : 12.50 K/uL  RBC Count : 4.05 M/uL  Hemoglobin : 8.9 g/dL  Hematocrit : 29.0 %  Platelet Count - Automated : 65 K/uL  Mean Cell Volume : 71.6 fL  Mean Cell Hemoglobin : 22.0 pg  Mean Cell Hemoglobin Concentration : 30.7 gm/dL  Auto Neutrophil # : x  Auto Lymphocyte # : x  Auto Monocyte # : x  Auto Eosinophil # : x  Auto Basophil # : x  Auto Neutrophil % : x  Auto Lymphocyte % : x  Auto Monocyte % : x  Auto Eosinophil % : x  Auto Basophil % : x    10-10    137  |  103  |  56<H>  ----------------------------<  101<H>  5.9<H>   |  16<L>  |  1.86<H>    Ca    7.9<L>      10 Oct 2021 06:43  Phos  4.5     10-10  Mg     2.30     10-10        EXAM:  CT BRAIN        PROCEDURE DATE:  Oct  4 2021         INTERPRETATION:  CLINICAL INDICATIONS: Altered mental status    Technique: CT of the head was performed.    Multiple contiguous axial images were acquired from the skullbase to the vertex without the administration of intravenous contrast.  Coronal and sagittal reformations were made.    COMPARISON: None available.    FINDINGS:  Prominence of the bifrontal axial space is seen which could be due to increased atrophy versus chronic subdural hematoma/hygroma. This finding measures approximately 0.6 cm in largest diameter bilaterally. Parenchymal volume loss and chronic microvascular ischemic changes are identified. There are no areas of abnormal attenuation within the brain parenchyma. There is no intraparenchymal hematoma, mass effect or midline shift. The basal cisterns are patent. No abnormal extra-axial fluid collections are present    The calvarium is intact. The visualized intraorbital compartments, paranasal sinuses and mastoid complexes appear free of acute disease.    IMPRESSION:  There is no acute hemorrhage or midline shift.    --- End of Report ---            MADI GILLILAND MD; Resident Radiologist  This document has been electronically signed.  NATALY BAUTISTA MD; Attending Radiologist  This document has been electronically signed. Oct  4 2021  9:14AM    < end of copied text >    
No events    MEDICATIONS  (STANDING):  ciprofloxacin   IVPB 400 milliGRAM(s) IV Intermittent every 12 hours  dextrose 40% Gel 15 Gram(s) Oral once  dextrose 5% + sodium chloride 0.9%. 1000 milliLiter(s) (100 mL/Hr) IV Continuous <Continuous>  dextrose 50% Injectable 25 Gram(s) IV Push once  dextrose 50% Injectable 12.5 Gram(s) IV Push once  dextrose 50% Injectable 25 Gram(s) IV Push once  glucagon  Injectable 1 milliGRAM(s) IntraMuscular once  heparin  Infusion.  Unit(s)/Hr (8 mL/Hr) IV Continuous <Continuous>  lidocaine 2% Jelly 10 milliLiter(s) IntraUrethral once  pantoprazole  Injectable 40 milliGRAM(s) IV Push daily  senna 2 Tablet(s) Oral at bedtime  sodium bicarbonate  Infusion 0.083 mEq/kG/Hr (50 mL/Hr) IV Continuous <Continuous>  sucralfate suspension 1 Gram(s) Oral two times a day    MEDICATIONS  (PRN):  bisacodyl Suppository 10 milliGRAM(s) Rectal daily PRN Constipation  heparin   Injectable 3500 Unit(s) IV Push every 6 hours PRN For aPTT less than 40  heparin   Injectable 1500 Unit(s) IV Push every 6 hours PRN For aPTT between 40 - 57  HYDROmorphone  Injectable 0.2 milliGRAM(s) IV Push every 2 hours PRN Moderate Pain (4 - 6)  HYDROmorphone  Injectable 0.5 milliGRAM(s) IV Push every 2 hours PRN Severe Pain (7 - 10)      ROS  limited 2/2 participation    Vital Signs Last 24 Hrs  T(C): 36.8 (07 Oct 2021 05:15), Max: 37.1 (06 Oct 2021 21:12)  T(F): 98.2 (07 Oct 2021 05:15), Max: 98.7 (06 Oct 2021 21:12)  HR: 93 (07 Oct 2021 10:31) (73 - 93)  BP: 99/64 (07 Oct 2021 10:31) (99/64 - 120/87)  BP(mean): --  RR: 15 (07 Oct 2021 10:31) (14 - 16)  SpO2: 100% (07 Oct 2021 10:31) (100% - 100%)    PE  NAD  sleepy  limited 2/2 participation                          9.1    7.18  )-----------( 73       ( 07 Oct 2021 06:57 )             28.9       10-07    134<L>  |  99  |  65<H>  ----------------------------<  157<H>  4.2   |  19<L>  |  1.62<H>    Ca    7.8<L>      07 Oct 2021 06:57  Phos  3.1     10-07  Mg     2.40     10-07    TPro  x   /  Alb  2.5<L>  /  TBili  x   /  DBili  x   /  AST  x   /  ALT  x   /  AlkPhos  x   10-06      
Patient seen and examined at bedside. pt remains AMS     MEDICATIONS  (STANDING):  albumin human 25% IVPB 50 milliLiter(s) IV Intermittent every 8 hours  cefTRIAXone   IVPB 1000 milliGRAM(s) IV Intermittent every 24 hours  dextrose 40% Gel 15 Gram(s) Oral once  dextrose 50% Injectable 25 Gram(s) IV Push once  dextrose 50% Injectable 12.5 Gram(s) IV Push once  dextrose 50% Injectable 25 Gram(s) IV Push once  glucagon  Injectable 1 milliGRAM(s) IntraMuscular once  heparin  Infusion.  Unit(s)/Hr (8 mL/Hr) IV Continuous <Continuous>  sodium chloride 0.9%. 1000 milliLiter(s) (100 mL/Hr) IV Continuous <Continuous>    MEDICATIONS  (PRN):  heparin   Injectable 3500 Unit(s) IV Push every 6 hours PRN For aPTT less than 40  heparin   Injectable 1500 Unit(s) IV Push every 6 hours PRN For aPTT between 40 - 57  HYDROmorphone  Injectable 0.5 milliGRAM(s) IV Push every 4 hours PRN Severe Pain (7 - 10)        Vital Signs Last 24 Hrs  T(C): 36.3 (05 Oct 2021 08:21), Max: 36.4 (04 Oct 2021 16:13)  T(F): 97.3 (05 Oct 2021 08:21), Max: 97.6 (04 Oct 2021 16:13)  HR: 87 (05 Oct 2021 08:21) (84 - 97)  BP: 101/81 (05 Oct 2021 08:21) (97/75 - 101/81)  BP(mean): --  RR: 17 (05 Oct 2021 08:21) (16 - 17)  SpO2: 100% (05 Oct 2021 08:21) (96% - 100%)      PHYSICAL EXAM:     GENERAL:  opens eyes to sound   HEENT:  NC/AT,    CHEST:  CTA b/l  HEART:  S1 s2+   ABDOMEN:  Mild distention   EXTEREMITIES:  no cyanosis,clubbing or edema  SKIN:  No rash/erythema/ecchymoses  LN: No palpable Lymphadenopathy    NEURO:  AOxo                             9.9    10.05 )-----------( 112      ( 05 Oct 2021 04:32 )             30.6       10-05    126<L>  |  92<L>  |  83<H>  ----------------------------<  98  5.8<H>   |  25  |  2.36<H>    Ca    8.5      05 Oct 2021 01:21  Phos  4.3     10-05  Mg     2.50     10-05    TPro  5.0<L>  /  Alb  2.1<L>  /  TBili  1.1  /  DBili  x   /  AST  74<H>  /  ALT  27  /  AlkPhos  1027<H>  10-05      
Name of Patient : JAELYN CLARKE  MRN: [id number]  Date of visit: 10-11-21      Subjective: Patient seen and examined. No new events except as noted.   lethargic     MEDICATIONS:  MEDICATIONS  (STANDING):  alteplase for catheter clearance 2 milliGRAM(s) Catheter once  ciprofloxacin   IVPB 200 milliGRAM(s) IV Intermittent every 12 hours  dextrose 40% Gel 15 Gram(s) Oral once  dextrose 5% + sodium chloride 0.9%. 1000 milliLiter(s) (50 mL/Hr) IV Continuous <Continuous>  dextrose 50% Injectable 25 Gram(s) IV Push once  dextrose 50% Injectable 12.5 Gram(s) IV Push once  dextrose 50% Injectable 25 Gram(s) IV Push once  glucagon  Injectable 1 milliGRAM(s) IntraMuscular once  heparin  Infusion.  Unit(s)/Hr (8 mL/Hr) IV Continuous <Continuous>  HYDROmorphone Infusion 0.1 mG/Hr (0.1 mL/Hr) IV Continuous <Continuous>  lidocaine 2% Jelly 10 milliLiter(s) IntraUrethral once  pantoprazole  Injectable 40 milliGRAM(s) IV Push daily  sucralfate suspension 1 Gram(s) Oral two times a day      PHYSICAL EXAM:  T(C): 36.3 (10-11-21 @ 20:58), Max: 36.9 (10-11-21 @ 05:55)  HR: 103 (10-11-21 @ 20:58) (100 - 103)  BP: 115/82 (10-11-21 @ 20:58) (101/81 - 115/82)  RR: 17 (10-11-21 @ 20:58) (17 - 17)  SpO2: 100% (10-11-21 @ 20:58) (100% - 100%)  Wt(kg): --  I&O's Summary    10 Oct 2021 07:01  -  11 Oct 2021 07:00  --------------------------------------------------------  IN: 472.8 mL / OUT: 200 mL / NET: 272.8 mL          Appearance: awake, letahgric   HEENT:  dry OM   Lymphatic: No lymphadenopathy   Cardiovascular: Normal S1 S2, no JVD  Respiratory: normal effort , clear  Gastrointestinal:  Soft, Non-tender  Skin: No rashes,  warm to touch  Psychiatry:  Mood & affect appropriate  Musculuskeletal: No edema      All labs, Imaging and EKGs personally reviewed       10-10-21 @ 07:01  -  10-11-21 @ 07:00  --------------------------------------------------------  IN: 472.8 mL / OUT: 200 mL / NET: 272.8 mL                            8.9    12.50 )-----------( 65       ( 10 Oct 2021 06:43 )             29.0               10-10    137  |  103  |  56<H>  ----------------------------<  101<H>  5.9<H>   |  16<L>  |  1.86<H>    Ca    7.9<L>      10 Oct 2021 06:43  Phos  4.5     10-10  Mg     2.30     10-10      PTT - ( 11 Oct 2021 07:37 )  PTT:86.9 sec                           
Name of Patient : JAELYN CLARKE  MRN: [id number]  Date of visit: 10-09-21      Subjective: Patient seen and examined. No new events except as noted.     MEDICATIONS:  MEDICATIONS  (STANDING):  alteplase for catheter clearance 2 milliGRAM(s) Catheter once  ciprofloxacin   IVPB 200 milliGRAM(s) IV Intermittent every 12 hours  dextrose 40% Gel 15 Gram(s) Oral once  dextrose 5% + sodium chloride 0.9%. 1000 milliLiter(s) (100 mL/Hr) IV Continuous <Continuous>  dextrose 50% Injectable 25 Gram(s) IV Push once  dextrose 50% Injectable 25 Gram(s) IV Push once  dextrose 50% Injectable 12.5 Gram(s) IV Push once  glucagon  Injectable 1 milliGRAM(s) IntraMuscular once  heparin  Infusion.  Unit(s)/Hr (8 mL/Hr) IV Continuous <Continuous>  HYDROmorphone Infusion 0.1 mG/Hr (0.1 mL/Hr) IV Continuous <Continuous>  lidocaine 2% Jelly 10 milliLiter(s) IntraUrethral once  pantoprazole  Injectable 40 milliGRAM(s) IV Push daily  sodium bicarbonate  Infusion 0.083 mEq/kG/Hr (50 mL/Hr) IV Continuous <Continuous>  sucralfate suspension 1 Gram(s) Oral two times a day      PHYSICAL EXAM:  T(C): 36.5 (10-09-21 @ 21:10), Max: 36.6 (10-09-21 @ 13:12)  HR: 114 (10-09-21 @ 21:10) (101 - 114)  BP: 112/82 (10-09-21 @ 21:10) (109/78 - 112/82)  RR: 19 (10-09-21 @ 21:10) (19 - 23)  SpO2: 97% (10-09-21 @ 21:10) (97% - 100%)  Wt(kg): --  I&O's Summary    08 Oct 2021 07:01  -  09 Oct 2021 07:00  --------------------------------------------------------  IN: 0 mL / OUT: 500 mL / NET: -500 mL          Appearance: frail  HEENT:  dry OM   Lymphatic: No lymphadenopathy   Cardiovascular: Normal S1 S2, no JVD  Respiratory: normal effort , clear  Gastrointestinal:  Soft, Non-tender  Skin: No rashes,  warm to touch  Psychiatry:  lethargic   Musculuskeletal: No edema      All labs, Imaging and EKGs personally reviewed       10-08-21 @ 07:01  -  10-09-21 @ 07:00  --------------------------------------------------------  IN: 0 mL / OUT: 500 mL / NET: -500 mL                          9.2    9.95  )-----------( 81       ( 09 Oct 2021 06:35 )             33.7               10-09    140  |  105  |  53<H>  ----------------------------<  90  4.9   |  19<L>  |  1.62<H>    Ca    8.0<L>      09 Oct 2021 10:10  Phos  3.4     10-09  Mg     2.30     10-09    TPro  4.6<L>  /  Alb  2.3<L>  /  TBili  1.1  /  DBili  x   /  AST  70<H>  /  ALT  24  /  AlkPhos  906<H>  10-08    PTT - ( 09 Oct 2021 10:10 )  PTT:75.7 sec                             
SUBJECTIVE AND OBJECTIVE:  pt more awake this am.  son, wife and daughter at bedside.  pt complains of pain in abdomen.  states it burns.  family concerned about pt not eating  INTERVAL HPI/OVERNIGHT EVENTS:    DNR on chart:   Allergies    No Known Allergies    Intolerances    MEDICATIONS  (STANDING):  cefTRIAXone   IVPB 1000 milliGRAM(s) IV Intermittent every 24 hours  dextrose 40% Gel 15 Gram(s) Oral once  dextrose 5% + sodium chloride 0.9%. 1000 milliLiter(s) (100 mL/Hr) IV Continuous <Continuous>  dextrose 50% Injectable 25 Gram(s) IV Push once  dextrose 50% Injectable 12.5 Gram(s) IV Push once  dextrose 50% Injectable 25 Gram(s) IV Push once  glucagon  Injectable 1 milliGRAM(s) IntraMuscular once  heparin  Infusion.  Unit(s)/Hr (8 mL/Hr) IV Continuous <Continuous>  lidocaine 2% Jelly 10 milliLiter(s) IntraUrethral once  pantoprazole  Injectable 40 milliGRAM(s) IV Push daily  senna 2 Tablet(s) Oral at bedtime  sucralfate suspension 1 Gram(s) Oral two times a day    MEDICATIONS  (PRN):  bisacodyl Suppository 10 milliGRAM(s) Rectal daily PRN Constipation  heparin   Injectable 3500 Unit(s) IV Push every 6 hours PRN For aPTT less than 40  heparin   Injectable 1500 Unit(s) IV Push every 6 hours PRN For aPTT between 40 - 57  HYDROmorphone  Injectable 0.2 milliGRAM(s) IV Push every 2 hours PRN Severe Pain (7 - 10)      ITEMS UNCHECKED ARE NOT PRESENT    PRESENT SYMPTOMS: [ ]Unable to obtain due to poor mentation   Source if other than patient:  [ ]Family   [ ]Team     Pain:  [ x]yes [ ]no  QOL impact - unable to eat   Location -    abdomen                Aggravating factors - none  Quality - burn  Radiation - none  Timing-  constant  Severity (0-10 scale):10/10  Minimal acceptable level (0-10 scale): 3/10    Dyspnea:                           [ ]Mild [ ]Moderate [ ]Severe  Anxiety:                             [ ]Mild [ ]Moderate [ ]Severe  Fatigue:                             [ ]Mild [ ]Moderate [x ]Severe  Nausea:                             [ ]Mild [ ]Moderate [ ]Severe  Loss of appetite:              [ ]Mild [ ]Moderate [x ]Severe  Constipation:                    [ ]Mild [ ]Moderate [ ]Severe    CPOT:    https://www.[facility].org/getattachment/[email or web address]/Critical-Care-Pain-Observation-Tool-(CPOT)    PAIN AD Score:	  http://geriatrictoolkit.[facility]/cog/painad.pdf (Ctrl + left click to view)    Other Symptoms:  [ x]All other review of systems negative     Palliative Performance Status Version 2:    40     %      http://[facility].org/files/news/palliative_performance_scale_ppsv2.pdf  PHYSICAL EXAM:  Vital Signs Last 24 Hrs  T(C): 36.3 (06 Oct 2021 11:42), Max: 36.5 (05 Oct 2021 21:20)  T(F): 97.3 (06 Oct 2021 11:42), Max: 97.7 (05 Oct 2021 21:20)  HR: 62 (06 Oct 2021 11:42) (62 - 75)  BP: 102/68 (06 Oct 2021 11:42) (89/63 - 102/68)  BP(mean): --  RR: 12 (06 Oct 2021 11:42) (8 - 14)  SpO2: 100% (06 Oct 2021 11:42) (99% - 100%) I&O's Summary     GENERAL:  [x ]Alert  [ x]Oriented x 3  [ ]Lethargic  [ ]Cachexia  [ ]Unarousable  [ ]Verbal  [ ]Non-Verbal  Behavioral:   [ ]Anxiety  [ ]Delirium [ ]Agitation [ ]Other  HEENT:  [ ]Normal   [x ]Dry mouth   [ ]ET Tube/Trach  [ ]Oral lesions  PULMONARY:   [ x]Clear [ ]Tachypnea  [ ]Audible excessive secretions   [ ]Rhonchi        [ ]Right [ ]Left [ ]Bilateral  [ ]Crackles        [ ]Right [ ]Left [ ]Bilateral  [ ]Wheezing     [ ]Right [ ]Left [ ]Bilateral  [ ]Diminished BS [ ] Right [ ]Left [ ]Bilateral  CARDIOVASCULAR:    [x ]Regular [ ]Irregular [ ]Tachy  [ ]Freddie [ ]Murmur [ ]Other  GASTROINTESTINAL:  [ ]Soft  [x ]Distended   [ ]+BS  [ ]Non tender [x ]Tender  [ ]PEG [ ]OGT/ NGT   Last BM:   +catheter  GENITOURINARY:  [ ]Normal [ ]Incontinent   [ ]Oliguria/Anuria   [x ]Alexandre  MUSCULOSKELETAL:   [ ]Normal   [ ]Weakness  [ x]Bed/Wheelchair bound [ ]Edema  NEUROLOGIC:   [x ]No focal deficits  [ ] Cognitive impairment  [ ] Dysphagia [ ]Dysarthria [ ] Paresis [ ]Other   SKIN:   [x ]Normal  [ ]Rash   [ ]Pressure ulcer(s) [ ]y [ ]n present on admission    CRITICAL CARE:  [ ]Shock Present  [ ]Septic [ ]Cardiogenic [ ]Neurologic [ ]Hypovolemic  [ ]Vasopressors [ ]Inotropes  [ ]Respiratory failure present [ ]Mechanical Ventilation [ ]Non-invasive ventilatory support [ ]High-Flow   [ ]Acute  [ ]Chronic [ ]Hypoxic  [ ]Hypercarbic [ ]Other  [ ]Other organ failure     LABS:                        8.9    7.70  )-----------( 96       ( 06 Oct 2021 02:57 )             28.1   10-06    131<L>  |  95<L>  |  76<H>  ----------------------------<  100<H>  4.9   |  18<L>  |  2.11<H>    Ca    8.3<L>      06 Oct 2021 02:57  Phos  3.9     10-  Mg     2.50     10-    TPro  x   /  Alb  2.5<L>  /  TBili  x   /  DBili  x   /  AST  x   /  ALT  x   /  AlkPhos  x   10-06  PTT - ( 06 Oct 2021 11:22 )  PTT:84.1 sec    Urinalysis Basic - ( 05 Oct 2021 11:58 )    Color: Yellow / Appearance: Clear / S.020 / pH: x  Gluc: x / Ketone: Negative  / Bili: Negative / Urobili: <2 mg/dL   Blood: x / Protein: 30 mg/dL / Nitrite: Negative   Leuk Esterase: Negative / RBC: 3 /HPF / WBC 1 /HPF   Sq Epi: x / Non Sq Epi: 0 /HPF / Bacteria: Negative      RADIOLOGY & ADDITIONAL STUDIES:    Protein Calorie Malnutrition Present: [ ]mild [ ]moderate [ ]severe [ ]underweight [ ]morbid obesity  https://www.andeal.org/vault/6485/web/files/ONC/Table_Clinical%20Characteristics%20to%20Document%20Malnutrition-White%20JV%20et%20al%2020.pdf    Height (cm): 167.6 (10-04-21 @ 11:32), 165.1 (07-15-21 @ 22:14), 165.1 (21 @ 19:34)  Weight (kg): 45.4 (10-04-21 @ 11:32), 64.5 (07-15-21 @ 22:14), 66 (21 @ 09:04)  BMI (kg/m2): 16.2 (10-04-21 @ 11:32), 23.7 (07-15-21 @ 22:14), 24.2 (21 @ 09:04)    [ ]PPSV2 < or = 30%  [ ]significant weight loss [ ]poor nutritional intake [ ]anasarca    [ ]Artificial Nutrition    REFERRALS:   [ ]Chaplaincy  [ ]Hospice  [ ]Child Life  [ ]Social Work  [ ]Case management [ ]Holistic Therapy     Goals of Care Document:    
SUBJECTIVE AND OBJECTIVE:  pt moaning and received multiple doses of iv dilaudid.    INTERVAL HPI/OVERNIGHT EVENTS:    DNR on chart:   Allergies    No Known Allergies    Intolerances    MEDICATIONS  (STANDING):  ciprofloxacin   IVPB 200 milliGRAM(s) IV Intermittent every 12 hours  dextrose 40% Gel 15 Gram(s) Oral once  dextrose 5% + sodium chloride 0.9%. 1000 milliLiter(s) (100 mL/Hr) IV Continuous <Continuous>  dextrose 50% Injectable 25 Gram(s) IV Push once  dextrose 50% Injectable 12.5 Gram(s) IV Push once  dextrose 50% Injectable 25 Gram(s) IV Push once  glucagon  Injectable 1 milliGRAM(s) IntraMuscular once  heparin  Infusion.  Unit(s)/Hr (8 mL/Hr) IV Continuous <Continuous>  HYDROmorphone Infusion 0.1 mG/Hr (0.5 mL/Hr) IV Continuous <Continuous>  lidocaine 2% Jelly 10 milliLiter(s) IntraUrethral once  pantoprazole  Injectable 40 milliGRAM(s) IV Push daily  sodium bicarbonate  Infusion 0.083 mEq/kG/Hr (50 mL/Hr) IV Continuous <Continuous>  sucralfate suspension 1 Gram(s) Oral two times a day    MEDICATIONS  (PRN):  bisacodyl Suppository 10 milliGRAM(s) Rectal daily PRN Constipation  heparin   Injectable 3500 Unit(s) IV Push every 6 hours PRN For aPTT less than 40  heparin   Injectable 1500 Unit(s) IV Push every 6 hours PRN For aPTT between 40 - 57  HYDROmorphone  Injectable 0.2 milliGRAM(s) IV Push every 2 hours PRN Moderate Pain (4 - 6)  HYDROmorphone  Injectable 0.5 milliGRAM(s) IV Push every 2 hours PRN Severe Pain (7 - 10)      ITEMS UNCHECKED ARE NOT PRESENT    PRESENT SYMPTOMS: [x ]Unable to obtain due to poor mentation   Source if other than patient:  [ ]Family   [ ]Team     Pain:  [ ]yes [ ]no  QOL impact -   Location -                    Aggravating factors -  Quality -  Radiation -  Timing-  Severity (0-10 scale):  Minimal acceptable level (0-10 scale):     Dyspnea:                           [ ]Mild [ ]Moderate [ ]Severe  Anxiety:                             [ ]Mild [ ]Moderate [ ]Severe  Fatigue:                             [ ]Mild [ ]Moderate [ ]Severe  Nausea:                             [ ]Mild [ ]Moderate [ ]Severe  Loss of appetite:              [ ]Mild [ ]Moderate [ ]Severe  Constipation:                    [ ]Mild [ ]Moderate [ ]Severe    CPOT:    https://www.[facility].org/getattachment/[email or web address]/Critical-Care-Pain-Observation-Tool-(CPOT)    PAIN AD Score:	  http://geriatrictoolkit.[facility]/cog/painad.pdf (Ctrl + left click to view)    Other Symptoms:  [ ]All other review of systems negative     Palliative Performance Status Version 2:   30      %      http://[facility].org/files/news/palliative_performance_scale_ppsv2.pdf  PHYSICAL EXAM:  Vital Signs Last 24 Hrs  T(C): 36.6 (08 Oct 2021 15:10), Max: 36.7 (08 Oct 2021 10:30)  T(F): 97.8 (08 Oct 2021 15:10), Max: 98 (08 Oct 2021 10:30)  HR: 106 (08 Oct 2021 15:10) (96 - 106)  BP: 102/66 (08 Oct 2021 15:10) (92/70 - 107/74)  BP(mean): --  RR: 18 (08 Oct 2021 15:10) (14 - 18)  SpO2: 100% (08 Oct 2021 15:10) (100% - 100%) I&O's Summary    07 Oct 2021 07:01  -  08 Oct 2021 07:00  --------------------------------------------------------  IN: 0 mL / OUT: 300 mL / NET: -300 mL       GENERAL:  [ ]Alert  [ ]Oriented x   [ x]Lethargic  [ ]Cachexia  [ ]Unarousable  [ ]Verbal  [ ]Non-Verbal  Behavioral:   [ ]Anxiety  [ ]Delirium [x ]Agitation [ ]Other  HEENT:  [ ]Normal   [x ]Dry mouth   [ ]ET Tube/Trach  [ ]Oral lesions  PULMONARY:   [x ]Clear [ ]Tachypnea  [ ]Audible excessive secretions   [ ]Rhonchi        [ ]Right [ ]Left [ ]Bilateral  [ ]Crackles        [ ]Right [ ]Left [ ]Bilateral  [ ]Wheezing     [ ]Right [ ]Left [ ]Bilateral  [ ]Diminished BS [ ] Right [ ]Left [ ]Bilateral  CARDIOVASCULAR:    [ ]Regular [ ]Irregular [x ]Tachy  [ ]Freddie [ ]Murmur [ ]Other  GASTROINTESTINAL:  [ ]Soft  [x ]Distended   [ ]+BS  [ ]Non tender [x ]Tender  [ ]PEG [ ]OGT/ NGT   Last BM:    GENITOURINARY:  [ ]Normal [ ]Incontinent   [ ]Oliguria/Anuria   [ x]Alexandre  MUSCULOSKELETAL:   [ ]Normal   [ ]Weakness  [ x]Bed/Wheelchair bound [ ]Edema  NEUROLOGIC:   [ ]No focal deficits  [x ] Cognitive impairment  [ ] Dysphagia [ ]Dysarthria [ ] Paresis [ ]Other   SKIN:   [ x]Normal  [ ]Rash   [ ]Pressure ulcer(s) [ ]y [ ]n present on admission    CRITICAL CARE:  [ ]Shock Present  [ ]Septic [ ]Cardiogenic [ ]Neurologic [ ]Hypovolemic  [ ]Vasopressors [ ]Inotropes  [ ]Respiratory failure present [ ]Mechanical Ventilation [ ]Non-invasive ventilatory support [ ]High-Flow   [ ]Acute  [ ]Chronic [ ]Hypoxic  [ ]Hypercarbic [ ]Other  [ ]Other organ failure     LABS:                        9.0    8.80  )-----------( 71       ( 08 Oct 2021 08:26 )             29.1   10-08    139  |  104  |  53<H>  ----------------------------<  112<H>  4.4   |  18<L>  |  1.51<H>    Ca    7.9<L>      08 Oct 2021 08:26  Phos  2.9     10-08  Mg     2.30     10-08    TPro  4.6<L>  /  Alb  2.3<L>  /  TBili  1.1  /  DBili  x   /  AST  70<H>  /  ALT  24  /  AlkPhos  906<H>  10-08  PTT - ( 08 Oct 2021 08:26 )  PTT:75.2 sec      RADIOLOGY & ADDITIONAL STUDIES:    Protein Calorie Malnutrition Present: [ ]mild [ ]moderate [ ]severe [ ]underweight [ ]morbid obesity  https://www.andeal.org/vault/9741/web/files/ONC/Table_Clinical%20Characteristics%20to%20Document%20Malnutrition-White%20JV%20et%20al%971483.pdf    Height (cm): 167.6 (10-04-21 @ 11:32), 165.1 (07-15-21 @ 22:14), 165.1 (01-13-21 @ 19:34)  Weight (kg): 45.4 (10-04-21 @ 11:32), 64.5 (07-15-21 @ 22:14), 66 (01-14-21 @ 09:04)  BMI (kg/m2): 16.2 (10-04-21 @ 11:32), 23.7 (07-15-21 @ 22:14), 24.2 (01-14-21 @ 09:04)    [ ]PPSV2 < or = 30%  [ ]significant weight loss [ ]poor nutritional intake [ ]anasarca    [ ]Artificial Nutrition    REFERRALS:   [ ]Chaplaincy  [ ]Hospice  [ ]Child Life  [ ]Social Work  [ ]Case management [ ]Holistic Therapy     Goals of Care Document:    
Name of Patient : JAELYN CLARKE  MRN: [id number]  Date of visit: 10-08-21       Subjective: Patient seen and examined. No new events except as noted.   lethargic   poor oral intake     MEDICATIONS:  MEDICATIONS  (STANDING):  ciprofloxacin   IVPB 200 milliGRAM(s) IV Intermittent every 12 hours  dextrose 40% Gel 15 Gram(s) Oral once  dextrose 5% + sodium chloride 0.9%. 1000 milliLiter(s) (100 mL/Hr) IV Continuous <Continuous>  dextrose 50% Injectable 25 Gram(s) IV Push once  dextrose 50% Injectable 12.5 Gram(s) IV Push once  dextrose 50% Injectable 25 Gram(s) IV Push once  glucagon  Injectable 1 milliGRAM(s) IntraMuscular once  heparin  Infusion.  Unit(s)/Hr (8 mL/Hr) IV Continuous <Continuous>  HYDROmorphone Infusion 0.1 mG/Hr (0.5 mL/Hr) IV Continuous <Continuous>  lidocaine 2% Jelly 10 milliLiter(s) IntraUrethral once  pantoprazole  Injectable 40 milliGRAM(s) IV Push daily  sodium bicarbonate  Infusion 0.083 mEq/kG/Hr (50 mL/Hr) IV Continuous <Continuous>  sucralfate suspension 1 Gram(s) Oral two times a day      PHYSICAL EXAM:  T(C): 36.6 (10-08-21 @ 15:10), Max: 36.7 (10-08-21 @ 10:30)  HR: 106 (10-08-21 @ 15:10) (96 - 106)  BP: 102/66 (10-08-21 @ 15:10) (92/70 - 107/74)  RR: 18 (10-08-21 @ 15:10) (14 - 18)  SpO2: 100% (10-08-21 @ 15:10) (100% - 100%)  Wt(kg): --  I&O's Summary    07 Oct 2021 07:01  -  08 Oct 2021 07:00  --------------------------------------------------------  IN: 0 mL / OUT: 300 mL / NET: -300 mL          Appearance: lethargic   HEENT:  Dry OM   Lymphatic: No lymphadenopathy   Cardiovascular: Normal S1 S2  Respiratory: normal effort , clear  Gastrointestinal:  Soft,  Skin: No rashes,  warm to touch  Psychiatry:  lethargic   Musculuskeletal: No edema      All labs, Imaging and EKGs personally reviewed                             9.0    8.80  )-----------( 71       ( 08 Oct 2021 08:26 )             29.1               10-08    139  |  104  |  53<H>  ----------------------------<  112<H>  4.4   |  18<L>  |  1.51<H>    Ca    7.9<L>      08 Oct 2021 08:26  Phos  2.9     10-08  Mg     2.30     10-08    TPro  4.6<L>  /  Alb  2.3<L>  /  TBili  1.1  /  DBili  x   /  AST  70<H>  /  ALT  24  /  AlkPhos  906<H>  10-08    PTT - ( 08 Oct 2021 08:26 )  PTT:75.2 sec                               10-07-21 @ 07:01  -  10-08-21 @ 07:00  --------------------------------------------------------  IN: 0 mL / OUT: 300 mL / NET: -300 mL            
SUBJECTIVE AND OBJECTIVE:    pt more awake, complains of pain in his stomach doesnt want to eat.  INTERVAL HPI/OVERNIGHT EVENTS:    DNR on chart:   Allergies    No Known Allergies    Intolerances    MEDICATIONS  (STANDING):  ciprofloxacin   IVPB 400 milliGRAM(s) IV Intermittent every 12 hours  dextrose 40% Gel 15 Gram(s) Oral once  dextrose 5% + sodium chloride 0.9%. 1000 milliLiter(s) (100 mL/Hr) IV Continuous <Continuous>  dextrose 50% Injectable 25 Gram(s) IV Push once  dextrose 50% Injectable 12.5 Gram(s) IV Push once  dextrose 50% Injectable 25 Gram(s) IV Push once  glucagon  Injectable 1 milliGRAM(s) IntraMuscular once  heparin  Infusion.  Unit(s)/Hr (8 mL/Hr) IV Continuous <Continuous>  lidocaine 2% Jelly 10 milliLiter(s) IntraUrethral once  pantoprazole  Injectable 40 milliGRAM(s) IV Push daily  senna 2 Tablet(s) Oral at bedtime  sodium bicarbonate  Infusion 0.083 mEq/kG/Hr (50 mL/Hr) IV Continuous <Continuous>  sucralfate suspension 1 Gram(s) Oral two times a day    MEDICATIONS  (PRN):  bisacodyl Suppository 10 milliGRAM(s) Rectal daily PRN Constipation  heparin   Injectable 3500 Unit(s) IV Push every 6 hours PRN For aPTT less than 40  heparin   Injectable 1500 Unit(s) IV Push every 6 hours PRN For aPTT between 40 - 57  HYDROmorphone  Injectable 0.2 milliGRAM(s) IV Push every 2 hours PRN Severe Pain (7 - 10)      ITEMS UNCHECKED ARE NOT PRESENT    PRESENT SYMPTOMS: [ ]Unable to obtain due to poor mentation   Source if other than patient:  [ ]Family   [ ]Team     Pain:  [x ]yes [ ]no  QOL impact -  unable to eat  Location -       abdomen              Aggravating factors -  none  Quality - burning  Radiation - none  Timing-  comes and goes  Severity (0-10 scale): 6/10  Minimal acceptable level (0-10 scale): 3/10    Dyspnea:                           [ ]Mild [ ]Moderate [ ]Severe  Anxiety:                             [ ]Mild [ ]Moderate [ ]Severe  Fatigue:                             [ ]Mild [ ]Moderate [ x]Severe  Nausea:                             [ ]Mild [ ]Moderate [ ]Severe  Loss of appetite:              [ ]Mild [ ]Moderate [x ]Severe  Constipation:                    [ ]Mild [ ]Moderate [ ]Severe    CPOT:    https://www.[facility].org/getattachment/[email or web address]/Critical-Care-Pain-Observation-Tool-(CPOT)    PAIN AD Score:	  http://geriatrictoolkit.[facility]/cog/painad.pdf (Ctrl + left click to view)    Other Symptoms:  [x ]All other review of systems negative     Palliative Performance Status Version 2:      30   %      http://[facility].org/files/news/palliative_performance_scale_ppsv2.pdf  PHYSICAL EXAM:  Vital Signs Last 24 Hrs  T(C): 36.8 (07 Oct 2021 05:15), Max: 37.1 (06 Oct 2021 21:12)  T(F): 98.2 (07 Oct 2021 05:15), Max: 98.7 (06 Oct 2021 21:12)  HR: 93 (07 Oct 2021 10:31) (73 - 93)  BP: 99/64 (07 Oct 2021 10:31) (99/64 - 120/87)  BP(mean): --  RR: 15 (07 Oct 2021 10:31) (14 - 16)  SpO2: 100% (07 Oct 2021 10:31) (100% - 100%) I&O's Summary    06 Oct 2021 07:01  -  07 Oct 2021 07:00  --------------------------------------------------------  IN: 0 mL / OUT: 500 mL / NET: -500 mL    07 Oct 2021 07:01  -  07 Oct 2021 14:54  --------------------------------------------------------  IN: 0 mL / OUT: 300 mL / NET: -300 mL       GENERAL:  [x ]Alert  [x ]Oriented x 3  [ ]Lethargic  [ ]Cachexia  [ ]Unarousable  [ ]Verbal  [ ]Non-Verbal  Behavioral:   [ ]Anxiety  [ ]Delirium [ ]Agitation [ ]Other  HEENT:  [ ]Normal   [x ]Dry mouth   [ ]ET Tube/Trach  [ ]Oral lesions  PULMONARY:   [x ]Clear [ ]Tachypnea  [ ]Audible excessive secretions   [ ]Rhonchi        [ ]Right [ ]Left [ ]Bilateral  [ ]Crackles        [ ]Right [ ]Left [ ]Bilateral  [ ]Wheezing     [ ]Right [ ]Left [ ]Bilateral  [ ]Diminished BS [ ] Right [ ]Left [ ]Bilateral  CARDIOVASCULAR:    [x ]Regular [ ]Irregular [ ]Tachy  [ ]Freddie [ ]Murmur [ ]Other  GASTROINTESTINAL:  [x ]Soft  [ x]Distended   [ ]+BS  [ ]Non tender [x ]Tender  [ ]PEG [ ]OGT/ NGT   Last BM:    GENITOURINARY:  [ ]Normal [ ]Incontinent   [ ]Oliguria/Anuria   [ x]Alexandre  MUSCULOSKELETAL:   [ ]Normal   [ ]Weakness  [x ]Bed/Wheelchair bound [ ]Edema  NEUROLOGIC:   [x ]No focal deficits  [ ] Cognitive impairment  [ ] Dysphagia [ ]Dysarthria [ ] Paresis [ ]Other   SKIN:   [x ]Normal  [ ]Rash   [ ]Pressure ulcer(s) [ ]y [ ]n present on admission    CRITICAL CARE:  [ ]Shock Present  [ ]Septic [ ]Cardiogenic [ ]Neurologic [ ]Hypovolemic  [ ]Vasopressors [ ]Inotropes  [ ]Respiratory failure present [ ]Mechanical Ventilation [ ]Non-invasive ventilatory support [ ]High-Flow   [ ]Acute  [ ]Chronic [ ]Hypoxic  [ ]Hypercarbic [ ]Other  [ ]Other organ failure     LABS:                        9.1    7.18  )-----------( 73       ( 07 Oct 2021 06:57 )             28.9   10-07    134<L>  |  99  |  65<H>  ----------------------------<  157<H>  4.2   |  19<L>  |  1.62<H>    Ca    7.8<L>      07 Oct 2021 06:57  Phos  3.1     10-07  Mg     2.40     10-07    TPro  x   /  Alb  2.5<L>  /  TBili  x   /  DBili  x   /  AST  x   /  ALT  x   /  AlkPhos  x   10-06  PTT - ( 07 Oct 2021 06:57 )  PTT:89.7 sec      RADIOLOGY & ADDITIONAL STUDIES:    Protein Calorie Malnutrition Present: [ ]mild [ ]moderate [ ]severe [ ]underweight [ ]morbid obesity  https://www.andeal.org/vault/2440/web/files/ONC/Table_Clinical%20Characteristics%20to%20Document%20Malnutrition-White%20JV%20et%20al%465381.pdf    Height (cm): 167.6 (10-04-21 @ 11:32), 165.1 (07-15-21 @ 22:14), 165.1 (01-13-21 @ 19:34)  Weight (kg): 45.4 (10-04-21 @ 11:32), 64.5 (07-15-21 @ 22:14), 66 (01-14-21 @ 09:04)  BMI (kg/m2): 16.2 (10-04-21 @ 11:32), 23.7 (07-15-21 @ 22:14), 24.2 (01-14-21 @ 09:04)    [ ]PPSV2 < or = 30%  [ ]significant weight loss [ ]poor nutritional intake [ ]anasarca    [ ]Artificial Nutrition    REFERRALS:   [ ]Chaplaincy  [ ]Hospice  [ ]Child Life  [ ]Social Work  [ ]Case management [ ]Holistic Therapy     Goals of Care Document:    
Name of Patient : JAELYN CLARKE  MRN: [id number]  Date of visit: 10-05-21      Subjective: Patient seen and examined. No new events except as noted.   lethargic  family at the bedside     MEDICATIONS:  MEDICATIONS  (STANDING):  albumin human 25% IVPB 50 milliLiter(s) IV Intermittent every 8 hours  cefTRIAXone   IVPB 1000 milliGRAM(s) IV Intermittent every 24 hours  dextrose 40% Gel 15 Gram(s) Oral once  dextrose 5% + sodium chloride 0.9%. 1000 milliLiter(s) (100 mL/Hr) IV Continuous <Continuous>  dextrose 50% Injectable 25 Gram(s) IV Push once  dextrose 50% Injectable 12.5 Gram(s) IV Push once  dextrose 50% Injectable 25 Gram(s) IV Push once  glucagon  Injectable 1 milliGRAM(s) IntraMuscular once  heparin  Infusion.  Unit(s)/Hr (8 mL/Hr) IV Continuous <Continuous>      PHYSICAL EXAM:  T(C): 36.3 (10-05-21 @ 08:21), Max: 36.3 (10-04-21 @ 20:34)  HR: 85 (10-05-21 @ 12:55) (78 - 93)  BP: 100/71 (10-05-21 @ 12:55) (93/68 - 101/81)  RR: 13 (10-05-21 @ 12:55) (11 - 17)  SpO2: 98% (10-05-21 @ 12:55) (98% - 100%)  Wt(kg): --  I&O's Summary        Appearance: lethergic 	  HEENT:  Dry OM   Lymphatic: No lymphadenopathy   Cardiovascular: Normal S1 S2  Respiratory: normal effort , clear  Gastrointestinal:  Soft, Non-tender  Skin: No rashes,  warm to touch  Psychiatry:  Mood & affect appropriate  Musculuskeletal: LE edema       All labs, Imaging and EKGs personally reviewed                           9.9    10.05 )-----------( 112      ( 05 Oct 2021 04:32 )             30.6               10    127<L>  |  93<L>  |  84<H>  ----------------------------<  82  5.4<H>   |  18<L>  |  2.25<H>    Ca    8.2<L>      05 Oct 2021 11:58  Phos  4.4     10-05  Mg     2.70     10-05    TPro  5.2<L>  /  Alb  2.2<L>  /  TBili  1.1  /  DBili  x   /  AST  77<H>  /  ALT  28  /  AlkPhos  1051<H>  10-05    PTT - ( 05 Oct 2021 11:58 )  PTT:54.7 sec                   Urinalysis Basic - ( 05 Oct 2021 11:58 )    Color: Yellow / Appearance: Clear / S.020 / pH: x  Gluc: x / Ketone: Negative  / Bili: Negative / Urobili: <2 mg/dL   Blood: x / Protein: 30 mg/dL / Nitrite: Negative   Leuk Esterase: Negative / RBC: 3 /HPF / WBC 1 /HPF   Sq Epi: x / Non Sq Epi: 0 /HPF / Bacteria: Negative                        
Name of Patient : JAELYN CLARKE  MRN: [id number]  Date of visit: 10-07-21       Subjective: Patient seen and examined. No new events except as noted.   lethargic  family at the bedside        MEDICATIONS:  MEDICATIONS  (STANDING):  ciprofloxacin   IVPB 200 milliGRAM(s) IV Intermittent every 12 hours  dextrose 40% Gel 15 Gram(s) Oral once  dextrose 5% + sodium chloride 0.9%. 1000 milliLiter(s) (100 mL/Hr) IV Continuous <Continuous>  dextrose 50% Injectable 25 Gram(s) IV Push once  dextrose 50% Injectable 12.5 Gram(s) IV Push once  dextrose 50% Injectable 25 Gram(s) IV Push once  glucagon  Injectable 1 milliGRAM(s) IntraMuscular once  heparin  Infusion.  Unit(s)/Hr (8 mL/Hr) IV Continuous <Continuous>  lidocaine 2% Jelly 10 milliLiter(s) IntraUrethral once  pantoprazole  Injectable 40 milliGRAM(s) IV Push daily  senna 2 Tablet(s) Oral at bedtime  sodium bicarbonate  Infusion 0.083 mEq/kG/Hr (50 mL/Hr) IV Continuous <Continuous>  sucralfate suspension 1 Gram(s) Oral two times a day      PHYSICAL EXAM:  T(C): 36.4 (10-07-21 @ 21:14), Max: 36.8 (10-07-21 @ 05:15)  HR: 98 (10-07-21 @ 21:14) (82 - 98)  BP: 107/74 (10-07-21 @ 21:14) (99/64 - 120/87)  RR: 14 (10-07-21 @ 21:14) (14 - 16)  SpO2: 100% (10-07-21 @ 21:14) (100% - 100%)  Wt(kg): --  I&O's Summary    06 Oct 2021 07:01  -  07 Oct 2021 07:00  --------------------------------------------------------  IN: 0 mL / OUT: 500 mL / NET: -500 mL    07 Oct 2021 07:01  -  07 Oct 2021 23:35  --------------------------------------------------------  IN: 0 mL / OUT: 300 mL / NET: -300 mL          Appearance: letahrgic   HEENT:  dry OM   Lymphatic: No lymphadenopathy   Cardiovascular: Normal S1 S2,  Respiratory: normal effort , clear  Gastrointestinal:  Soft, Non-tender  Skin: No rashes,  warm to touch  Psychiatry:  Mood & affect appropriate  Musculuskeletal: muscle loss      All labs, Imaging and EKGs personally reviewed       10-06-21 @ 07:01  -  10-07-21 @ 07:00  --------------------------------------------------------  IN: 0 mL / OUT: 500 mL / NET: -500 mL    10-07-21 @ 07:01  -  10-07-21 @ 23:35  --------------------------------------------------------  IN: 0 mL / OUT: 300 mL / NET: -300 mL                          9.1    7.18  )-----------( 73       ( 07 Oct 2021 06:57 )             28.9               10-07    134<L>  |  99  |  65<H>  ----------------------------<  157<H>  4.2   |  19<L>  |  1.62<H>    Ca    7.8<L>      07 Oct 2021 06:57  Phos  3.1     10-07  Mg     2.40     10-07    TPro  x   /  Alb  2.5<L>  /  TBili  x   /  DBili  x   /  AST  x   /  ALT  x   /  AlkPhos  x   10-06    PTT - ( 07 Oct 2021 06:57 )  PTT:89.7 sec

## 2021-10-12 NOTE — PROGRESS NOTE ADULT - ASSESSMENT
JAELYN CLARKE is a 66y Male who presents with a chief complaint of AMS    Colon Cancer  - Follows with [facility]  - Diagnosed in February 2021; last treated with FOLFOX in August 2021. Metastatic disease worsening in September 2021 with discussions pending to switch to FOLFIRI + Bevacizumab.  - No longer a candidate for further therapy; palliative care consulted for hospice referral.   - Patient is on pleasure feeds.   - Continue pain control with hydromorphone PCA. Mental status improved with adjustments to PCA  - pending home hospice     Altered Mental Status/Abnormal Electrolytes  - Patient presents with progressive decline with acute kidney injury. Electrolyte disturbances with hyponatremia, hyperkalemia, hypochloremia.  - Alkaline phosphatase elevated at 1153.  - Uric acid very elevated at 14.3. In the ED had received rasburicase and IVF.  - Continue to monitor.     History of DVT  - lower ext Acute thrombus extending from the left external iliac vein to the level of the left profunda vein. Please note the proximal extent of the thrombus is not visualized.  - Patient remains on heparin drip. Will need to discuss with family on goals of care as to whether to continue heparin drip or switch to DOAC or discontinue anticoagulation all together.  - pp to follow     Anemia, Thrombocytopenia  - PLT 65 2 days ago   - cont to monitor     John Vogel MD  Hematology Oncology   O: [phone]  C: [phone]

## 2021-10-25 NOTE — ED PROCEDURE NOTE - CPROC ED POST PROC CARE GUIDE1
Verbal/written post procedure instructions were given to patient/caregiver. Metronidazole Counseling:  I discussed with the patient the risks of metronidazole including but not limited to seizures, nausea/vomiting, a metallic taste in the mouth, nausea/vomiting and severe allergy.

## 2021-12-17 NOTE — PROGRESS NOTE ADULT - PROBLEM/PLAN-7
DISPLAY PLAN FREE TEXT
DISPLAY PLAN FREE TEXT
[employer]
DISPLAY PLAN FREE TEXT

## 2022-10-27 NOTE — PROGRESS NOTE ADULT - ASSESSMENT
Mr. Cohn is a 66YOM with active colorectal cancer (mets to liver first dx'd Dec 2020, started chemo in Feb 2021) and DVTs on Xarelto presented to the ED for fever and altered mental status, now with ascitic fluid consistent with SBP. Admitted for sepsis 2/2 SBP (spontaneous vs secondary to intrucmentation). Will need inpatient broad spectrum abx. Detail Level: Zone Initiate Treatment: Recommend Elta MD sunscreen or any broad spectrum SPF +35 daily. Recommend reapplication with sun exposure exceeding 2 hours.

## 2023-04-11 NOTE — CONSULT NOTE ADULT - ASSESSMENT
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 MICU consulted for concern for TLS.    RECOMMENDATIONS:  #Tumor Lysis Syndrome  - Would consult Nephrology and Hematology/Oncology  - Agree with insulin/dextrose for hyperkalemia, follow up BMP, uric acid, LDH, phos q4-6h  - Recommend aggressive fluid resuscitation. S/p 2L so far. Patient dry on exam, would continue with more IV fluid.    #AMS  - Likely 2/2 Dilaudid  - Continue to monitor airway, is currently protecting airway, would hold off on reversal at this time    Recommend goals of care conversation for this patient. 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 MICU consulted for concern for TLS.    RECOMMENDATIONS:  #Tumor Lysis Syndrome  - Would consult Nephrology and Hematology/Oncology  - Agree with insulin/dextrose for hyperkalemia, follow up BMP, uric acid, LDH, phos q4-6h  - Recommend aggressive fluid resuscitation. S/p 2L so far. Patient dry on exam, would continue with more IV fluid.    #AMS  - Likely 2/2 Dilaudid  - Continue to monitor airway, is currently protecting airway, would hold off on reversal at this t    Recommend goals of care conversation for this patient. Prognosis guarded  Patient is not a MICU candidate at this time    Tadeo Hahn MD PGY-3  Internal Medicine  [facility] [id number] left hand

## 2023-10-02 NOTE — H&P ADULT - PROBLEM SELECTOR PLAN 2
From: Dorcas Mckeon  To: Jorge Alberto Carpenter  Sent: 10/1/2023 6:51 PM CDT  Subject: Nose cauterizing     Hey Dr. Carpenter,    I forgot to mention on Friday that I’ve been getting pretty bad nose bleeds for the past couple of years and sometimes multiple times in one day when I get them. Is there a way to get my nose cauterized or do I have to see ENT for this?    Thanks!   May    elevated uric acid, LDH, K, phos concerning for TLS  -received 2L LR, Rasburicase, calcium gluconate/insulin/D50 in ER  -monitor TLS labs (BMP, LDH, Uric acid) q8  -will consider bicarb gtt if remains acidotic  -nephrology and oncology consult

## 2024-12-11 NOTE — ED ADULT NURSE NOTE - SUICIDE SCREENING QUESTION 1
No
For information on Fall & Injury Prevention, visit: [email or web address] OR  [email or web address] OR  https://www.cdc.gov/steadi/patient.html